# Patient Record
Sex: FEMALE | Race: WHITE | NOT HISPANIC OR LATINO
[De-identification: names, ages, dates, MRNs, and addresses within clinical notes are randomized per-mention and may not be internally consistent; named-entity substitution may affect disease eponyms.]

---

## 2017-03-29 ENCOUNTER — APPOINTMENT (OUTPATIENT)
Dept: FAMILY MEDICINE | Facility: CLINIC | Age: 22
End: 2017-03-29

## 2017-04-21 ENCOUNTER — APPOINTMENT (OUTPATIENT)
Dept: FAMILY MEDICINE | Facility: CLINIC | Age: 22
End: 2017-04-21

## 2017-04-21 VITALS
BODY MASS INDEX: 42.48 KG/M2 | DIASTOLIC BLOOD PRESSURE: 60 MMHG | SYSTOLIC BLOOD PRESSURE: 110 MMHG | TEMPERATURE: 98.5 F | WEIGHT: 225 LBS | HEIGHT: 61 IN

## 2017-04-21 DIAGNOSIS — R23.8 OTHER SKIN CHANGES: ICD-10-CM

## 2017-04-21 RX ORDER — OLANZAPINE 10 MG/1
10 TABLET, FILM COATED ORAL
Qty: 30 | Refills: 0 | Status: COMPLETED | COMMUNITY
Start: 2016-12-24

## 2017-04-25 LAB
ALBUMIN SERPL ELPH-MCNC: 4 G/DL
ALP BLD-CCNC: 69 U/L
ALT SERPL-CCNC: 9 U/L
APTT BLD: 27.4 SEC
AST SERPL-CCNC: 13 U/L
BASOPHILS # BLD AUTO: 0.02 K/UL
BASOPHILS NFR BLD AUTO: 0.2 %
BILIRUB DIRECT SERPL-MCNC: 0.1 MG/DL
BILIRUB INDIRECT SERPL-MCNC: 0.1 MG/DL
BILIRUB SERPL-MCNC: 0.2 MG/DL
EOSINOPHIL # BLD AUTO: 0.01 K/UL
EOSINOPHIL NFR BLD AUTO: 0.1 %
HCT VFR BLD CALC: 40.1 %
HGB BLD-MCNC: 12.4 G/DL
IMM GRANULOCYTES NFR BLD AUTO: 0.1 %
INR PPP: 0.97 RATIO
LYMPHOCYTES # BLD AUTO: 2.04 K/UL
LYMPHOCYTES NFR BLD AUTO: 19.6 %
MAN DIFF?: NORMAL
MCHC RBC-ENTMCNC: 24.5 PG
MCHC RBC-ENTMCNC: 30.9 GM/DL
MCV RBC AUTO: 79.2 FL
MONOCYTES # BLD AUTO: 0.5 K/UL
MONOCYTES NFR BLD AUTO: 4.8 %
NEUTROPHILS # BLD AUTO: 7.82 K/UL
NEUTROPHILS NFR BLD AUTO: 75.2 %
PLATELET # BLD AUTO: 382 K/UL
PROT SERPL-MCNC: 7.8 G/DL
PT BLD: 10.9 SEC
RBC # BLD: 5.06 M/UL
RBC # FLD: 17.3 %
WBC # FLD AUTO: 10.4 K/UL

## 2017-06-30 ENCOUNTER — APPOINTMENT (OUTPATIENT)
Dept: FAMILY MEDICINE | Facility: CLINIC | Age: 22
End: 2017-06-30

## 2017-06-30 VITALS
WEIGHT: 225 LBS | HEART RATE: 64 BPM | BODY MASS INDEX: 42.48 KG/M2 | DIASTOLIC BLOOD PRESSURE: 64 MMHG | HEIGHT: 61 IN | SYSTOLIC BLOOD PRESSURE: 100 MMHG

## 2017-06-30 RX ORDER — BREXPIPRAZOLE 1 MG/1
1 TABLET ORAL
Qty: 30 | Refills: 0 | Status: COMPLETED | COMMUNITY
Start: 2017-04-05 | End: 2017-06-30

## 2017-07-22 ENCOUNTER — APPOINTMENT (OUTPATIENT)
Dept: FAMILY MEDICINE | Facility: CLINIC | Age: 22
End: 2017-07-22

## 2017-07-22 VITALS
DIASTOLIC BLOOD PRESSURE: 74 MMHG | SYSTOLIC BLOOD PRESSURE: 136 MMHG | TEMPERATURE: 98.3 F | BODY MASS INDEX: 42.48 KG/M2 | HEIGHT: 61 IN | WEIGHT: 225 LBS | HEART RATE: 85 BPM | OXYGEN SATURATION: 99 %

## 2017-07-22 DIAGNOSIS — L60.0 INGROWING NAIL: ICD-10-CM

## 2017-07-22 RX ORDER — CEPHALEXIN 250 MG/1
250 CAPSULE ORAL 3 TIMES DAILY
Qty: 21 | Refills: 0 | Status: DISCONTINUED | COMMUNITY
Start: 2017-06-30 | End: 2017-07-22

## 2017-07-24 ENCOUNTER — APPOINTMENT (OUTPATIENT)
Dept: FAMILY MEDICINE | Facility: CLINIC | Age: 22
End: 2017-07-24

## 2017-07-24 VITALS
HEIGHT: 60 IN | BODY MASS INDEX: 43.19 KG/M2 | OXYGEN SATURATION: 98 % | SYSTOLIC BLOOD PRESSURE: 106 MMHG | WEIGHT: 220 LBS | HEART RATE: 97 BPM | DIASTOLIC BLOOD PRESSURE: 72 MMHG | TEMPERATURE: 98.1 F

## 2017-07-24 DIAGNOSIS — L03.031 CELLULITIS OF RIGHT TOE: ICD-10-CM

## 2017-09-10 ENCOUNTER — FORM ENCOUNTER (OUTPATIENT)
Age: 22
End: 2017-09-10

## 2017-09-11 ENCOUNTER — APPOINTMENT (OUTPATIENT)
Dept: RADIOLOGY | Facility: CLINIC | Age: 22
End: 2017-09-11

## 2017-09-11 ENCOUNTER — APPOINTMENT (OUTPATIENT)
Dept: FAMILY MEDICINE | Facility: CLINIC | Age: 22
End: 2017-09-11
Payer: MEDICAID

## 2017-09-11 ENCOUNTER — OUTPATIENT (OUTPATIENT)
Dept: OUTPATIENT SERVICES | Facility: HOSPITAL | Age: 22
LOS: 1 days | End: 2017-09-11
Payer: MEDICAID

## 2017-09-11 VITALS
HEART RATE: 96 BPM | WEIGHT: 220 LBS | HEIGHT: 60 IN | OXYGEN SATURATION: 98 % | BODY MASS INDEX: 43.19 KG/M2 | SYSTOLIC BLOOD PRESSURE: 110 MMHG | DIASTOLIC BLOOD PRESSURE: 78 MMHG

## 2017-09-11 DIAGNOSIS — M25.571 PAIN IN RIGHT ANKLE AND JOINTS OF RIGHT FOOT: ICD-10-CM

## 2017-09-11 DIAGNOSIS — M79.671 PAIN IN RIGHT FOOT: ICD-10-CM

## 2017-09-11 DIAGNOSIS — Z00.8 ENCOUNTER FOR OTHER GENERAL EXAMINATION: ICD-10-CM

## 2017-09-11 PROCEDURE — 73610 X-RAY EXAM OF ANKLE: CPT | Mod: 26,RT

## 2017-09-11 PROCEDURE — 73610 X-RAY EXAM OF ANKLE: CPT

## 2017-09-11 PROCEDURE — 73630 X-RAY EXAM OF FOOT: CPT | Mod: 26,RT

## 2017-09-11 PROCEDURE — 99213 OFFICE O/P EST LOW 20 MIN: CPT

## 2017-09-11 PROCEDURE — 73630 X-RAY EXAM OF FOOT: CPT

## 2017-09-29 ENCOUNTER — APPOINTMENT (OUTPATIENT)
Dept: FAMILY MEDICINE | Facility: CLINIC | Age: 22
End: 2017-09-29
Payer: MEDICAID

## 2017-09-29 VITALS
DIASTOLIC BLOOD PRESSURE: 80 MMHG | HEIGHT: 60 IN | BODY MASS INDEX: 44.17 KG/M2 | TEMPERATURE: 98.2 F | WEIGHT: 225 LBS | SYSTOLIC BLOOD PRESSURE: 110 MMHG

## 2017-09-29 PROCEDURE — 99213 OFFICE O/P EST LOW 20 MIN: CPT | Mod: 25

## 2017-09-29 PROCEDURE — 69209 REMOVE IMPACTED EAR WAX UNI: CPT | Mod: 50

## 2017-09-29 RX ORDER — CEFADROXIL 500 MG/1
500 CAPSULE ORAL TWICE DAILY
Qty: 20 | Refills: 0 | Status: DISCONTINUED | COMMUNITY
Start: 2017-07-22 | End: 2017-09-29

## 2017-09-29 RX ORDER — BREXPIPRAZOLE 3 MG/1
3 TABLET ORAL
Qty: 30 | Refills: 0 | Status: DISCONTINUED | COMMUNITY
Start: 2017-05-31 | End: 2017-09-29

## 2017-09-29 RX ORDER — BREXPIPRAZOLE 4 MG/1
4 TABLET ORAL
Qty: 30 | Refills: 0 | Status: DISCONTINUED | COMMUNITY
Start: 2017-08-03 | End: 2017-09-29

## 2017-10-20 ENCOUNTER — APPOINTMENT (OUTPATIENT)
Dept: FAMILY MEDICINE | Facility: CLINIC | Age: 22
End: 2017-10-20
Payer: MEDICAID

## 2017-10-20 VITALS
HEART RATE: 106 BPM | OXYGEN SATURATION: 98 % | DIASTOLIC BLOOD PRESSURE: 80 MMHG | SYSTOLIC BLOOD PRESSURE: 110 MMHG | TEMPERATURE: 98.3 F | BODY MASS INDEX: 44.17 KG/M2 | HEIGHT: 60 IN | WEIGHT: 225 LBS

## 2017-10-20 PROCEDURE — 99213 OFFICE O/P EST LOW 20 MIN: CPT | Mod: 25

## 2017-10-20 PROCEDURE — 69209 REMOVE IMPACTED EAR WAX UNI: CPT | Mod: LT

## 2017-11-07 ENCOUNTER — APPOINTMENT (OUTPATIENT)
Dept: FAMILY MEDICINE | Facility: CLINIC | Age: 22
End: 2017-11-07
Payer: MEDICAID

## 2017-11-07 VITALS
BODY MASS INDEX: 44.17 KG/M2 | DIASTOLIC BLOOD PRESSURE: 78 MMHG | TEMPERATURE: 98.6 F | HEIGHT: 60 IN | HEART RATE: 87 BPM | OXYGEN SATURATION: 98 % | WEIGHT: 225 LBS | SYSTOLIC BLOOD PRESSURE: 120 MMHG

## 2017-11-07 DIAGNOSIS — Z86.69 PERSONAL HISTORY OF OTHER DISEASES OF THE NERVOUS SYSTEM AND SENSE ORGANS: ICD-10-CM

## 2017-11-07 DIAGNOSIS — H61.22 IMPACTED CERUMEN, LEFT EAR: ICD-10-CM

## 2017-11-07 DIAGNOSIS — Z87.898 PERSONAL HISTORY OF OTHER SPECIFIED CONDITIONS: ICD-10-CM

## 2017-11-07 DIAGNOSIS — H65.92 UNSPECIFIED NONSUPPURATIVE OTITIS MEDIA, LEFT EAR: ICD-10-CM

## 2017-11-07 PROCEDURE — 69209 REMOVE IMPACTED EAR WAX UNI: CPT

## 2017-11-07 PROCEDURE — 99213 OFFICE O/P EST LOW 20 MIN: CPT | Mod: 25

## 2017-11-07 RX ORDER — AMOXICILLIN AND CLAVULANATE POTASSIUM 875; 125 MG/1; MG/1
875-125 TABLET, COATED ORAL
Qty: 20 | Refills: 0 | Status: DISCONTINUED | COMMUNITY
Start: 2017-10-20 | End: 2017-11-07

## 2017-11-21 ENCOUNTER — FORM ENCOUNTER (OUTPATIENT)
Age: 22
End: 2017-11-21

## 2017-11-22 ENCOUNTER — OUTPATIENT (OUTPATIENT)
Dept: OUTPATIENT SERVICES | Facility: HOSPITAL | Age: 22
LOS: 1 days | End: 2017-11-22
Payer: MEDICAID

## 2017-11-22 ENCOUNTER — APPOINTMENT (OUTPATIENT)
Dept: MRI IMAGING | Facility: CLINIC | Age: 22
End: 2017-11-22
Payer: MEDICAID

## 2017-11-22 DIAGNOSIS — Z00.8 ENCOUNTER FOR OTHER GENERAL EXAMINATION: ICD-10-CM

## 2017-11-22 PROCEDURE — 73721 MRI JNT OF LWR EXTRE W/O DYE: CPT

## 2017-11-22 PROCEDURE — 73721 MRI JNT OF LWR EXTRE W/O DYE: CPT | Mod: 26,RT

## 2017-11-22 PROCEDURE — 73718 MRI LOWER EXTREMITY W/O DYE: CPT

## 2017-11-22 PROCEDURE — 73718 MRI LOWER EXTREMITY W/O DYE: CPT | Mod: 26,RT

## 2017-12-29 ENCOUNTER — APPOINTMENT (OUTPATIENT)
Dept: ORTHOPEDIC SURGERY | Facility: CLINIC | Age: 22
End: 2017-12-29
Payer: MEDICAID

## 2017-12-29 VITALS
DIASTOLIC BLOOD PRESSURE: 81 MMHG | HEIGHT: 60 IN | HEART RATE: 101 BPM | BODY MASS INDEX: 44.17 KG/M2 | WEIGHT: 225 LBS | SYSTOLIC BLOOD PRESSURE: 121 MMHG

## 2017-12-29 DIAGNOSIS — M67.00 SHORT ACHILLES TENDON (ACQUIRED), UNSPECIFIED ANKLE: ICD-10-CM

## 2017-12-29 PROCEDURE — 99204 OFFICE O/P NEW MOD 45 MIN: CPT

## 2017-12-29 PROCEDURE — 73610 X-RAY EXAM OF ANKLE: CPT | Mod: RT

## 2018-01-09 ENCOUNTER — RESULT CHARGE (OUTPATIENT)
Age: 23
End: 2018-01-09

## 2018-01-10 ENCOUNTER — APPOINTMENT (OUTPATIENT)
Dept: FAMILY MEDICINE | Facility: CLINIC | Age: 23
End: 2018-01-10
Payer: MEDICAID

## 2018-01-10 VITALS
HEART RATE: 74 BPM | WEIGHT: 236 LBS | DIASTOLIC BLOOD PRESSURE: 70 MMHG | SYSTOLIC BLOOD PRESSURE: 120 MMHG | TEMPERATURE: 98.3 F | HEIGHT: 60 IN | BODY MASS INDEX: 46.33 KG/M2 | OXYGEN SATURATION: 99 %

## 2018-01-10 DIAGNOSIS — H61.23 IMPACTED CERUMEN, BILATERAL: ICD-10-CM

## 2018-01-10 PROCEDURE — 99213 OFFICE O/P EST LOW 20 MIN: CPT | Mod: 25

## 2018-01-10 PROCEDURE — 69209 REMOVE IMPACTED EAR WAX UNI: CPT | Mod: LT

## 2018-01-10 RX ORDER — CIPROFLOXACIN AND DEXAMETHASONE 3; 1 MG/ML; MG/ML
0.3-0.1 SUSPENSION/ DROPS AURICULAR (OTIC) TWICE DAILY
Qty: 1 | Refills: 0 | Status: DISCONTINUED | COMMUNITY
Start: 2017-11-07 | End: 2018-01-10

## 2018-04-24 ENCOUNTER — APPOINTMENT (OUTPATIENT)
Dept: FAMILY MEDICINE | Facility: CLINIC | Age: 23
End: 2018-04-24
Payer: MEDICAID

## 2018-04-24 VITALS
BODY MASS INDEX: 46.33 KG/M2 | TEMPERATURE: 97.6 F | OXYGEN SATURATION: 98 % | HEART RATE: 72 BPM | SYSTOLIC BLOOD PRESSURE: 132 MMHG | HEIGHT: 60 IN | RESPIRATION RATE: 16 BRPM | DIASTOLIC BLOOD PRESSURE: 70 MMHG | WEIGHT: 236 LBS

## 2018-04-24 PROCEDURE — 99213 OFFICE O/P EST LOW 20 MIN: CPT | Mod: 25

## 2018-04-24 PROCEDURE — 69209 REMOVE IMPACTED EAR WAX UNI: CPT

## 2018-05-08 ENCOUNTER — APPOINTMENT (OUTPATIENT)
Dept: FAMILY MEDICINE | Facility: CLINIC | Age: 23
End: 2018-05-08
Payer: MEDICAID

## 2018-05-08 VITALS
HEIGHT: 60 IN | HEART RATE: 103 BPM | DIASTOLIC BLOOD PRESSURE: 80 MMHG | WEIGHT: 233.5 LBS | SYSTOLIC BLOOD PRESSURE: 122 MMHG | OXYGEN SATURATION: 99 % | BODY MASS INDEX: 45.84 KG/M2

## 2018-05-08 PROCEDURE — 99213 OFFICE O/P EST LOW 20 MIN: CPT

## 2018-05-08 RX ORDER — MIRTAZAPINE 15 MG/1
15 TABLET, FILM COATED ORAL
Qty: 30 | Refills: 0 | Status: DISCONTINUED | COMMUNITY
Start: 2017-09-20 | End: 2018-05-08

## 2018-05-08 RX ORDER — LORAZEPAM 1 MG/1
1 TABLET ORAL
Qty: 30 | Refills: 0 | Status: DISCONTINUED | COMMUNITY
Start: 2017-09-20 | End: 2018-05-08

## 2018-05-19 ENCOUNTER — APPOINTMENT (OUTPATIENT)
Dept: FAMILY MEDICINE | Facility: CLINIC | Age: 23
End: 2018-05-19
Payer: MEDICAID

## 2018-05-19 VITALS
TEMPERATURE: 98.5 F | SYSTOLIC BLOOD PRESSURE: 112 MMHG | BODY MASS INDEX: 45.84 KG/M2 | DIASTOLIC BLOOD PRESSURE: 64 MMHG | HEIGHT: 60 IN | WEIGHT: 233.5 LBS

## 2018-05-19 PROCEDURE — 99213 OFFICE O/P EST LOW 20 MIN: CPT

## 2018-05-19 NOTE — ASSESSMENT
[FreeTextEntry1] : Abdominal pain, and vomiting with little p.o. intake in the past 24 hours. Referred back to emergency room

## 2018-05-19 NOTE — HISTORY OF PRESENT ILLNESS
[FreeTextEntry8] : Vomiting and right upper and mid quadrant abdominal pain began yesterday morning at 11 AM driving home from [place]. The vomiting continued into the late afternoon then stopped and then began again later that evening. Patient was able to sleep however, at 5 AM began complaining again of abdominal pain with vomiting along with right thigh and knee pain. Patient had loose bowel movement earlier this morning. Has been unable to eat or drink.\par \par Similar symptoms 2 weeks ago evaluated in emergency room CAT scan ultrasound. Blood tests all normal. Patient discharged home\par \par Chronic psychiatric illness. Causes patientTo be slow to respond and tearful. He has tenderness of her right upper quadranT THIGH AND KNEE PAIN

## 2018-05-22 ENCOUNTER — APPOINTMENT (OUTPATIENT)
Dept: FAMILY MEDICINE | Facility: CLINIC | Age: 23
End: 2018-05-22

## 2018-05-31 ENCOUNTER — APPOINTMENT (OUTPATIENT)
Dept: FAMILY MEDICINE | Facility: CLINIC | Age: 23
End: 2018-05-31
Payer: MEDICAID

## 2018-05-31 VITALS
WEIGHT: 229.5 LBS | SYSTOLIC BLOOD PRESSURE: 116 MMHG | DIASTOLIC BLOOD PRESSURE: 70 MMHG | BODY MASS INDEX: 45.06 KG/M2 | HEIGHT: 60 IN

## 2018-05-31 PROCEDURE — 99213 OFFICE O/P EST LOW 20 MIN: CPT

## 2018-06-02 NOTE — REVIEW OF SYSTEMS
[Abdominal Pain] : abdominal pain [Nausea] : nausea [Vomiting] : vomiting [Joint Pain] : joint pain [Negative] : Respiratory [FreeTextEntry7] : see hpi

## 2018-06-02 NOTE — HISTORY OF PRESENT ILLNESS
[de-identified] : Pt for f/u unremitting nausea/vomiting started during was readmitted to [facility] 5/19/18 discharged 5/22/18. Pt had HIDA scan and upper endoscopy were performed during hospitalization which were equivocal. Pt then f/u with GI on 5/25/18 and was rx famotidine 20mg. Pt now back to baseline. Pt c/o continued R knee pain. Denies trauma. Advil helped pain.

## 2018-06-02 NOTE — ASSESSMENT
[FreeTextEntry1] : abdominal pain - resolved. monitor.\par R knee pain - ibuprofen prn. check XR of knee

## 2018-07-09 LAB
GLUCOSE 1H P LAC PO SERPL-MCNC: 113 MG/DL
GLUCOSE 30M P LAC PO SERPL-MCNC: 117 MG/DL
LACTOSE 1.5H P LAC PO SERPL-SCNC: 95 MG/DL
LACTOSE 2H P 50 G LAC PO SERPL-MCNC: 99 MG/DL
LACTOSE 3H P LAC PO SERPL-MCNC: 93 MG/DL
LACTOSE PRE FAST SERPL-MCNC: 93 MG/DL

## 2018-07-11 ENCOUNTER — APPOINTMENT (OUTPATIENT)
Dept: FAMILY MEDICINE | Facility: CLINIC | Age: 23
End: 2018-07-11
Payer: MEDICAID

## 2018-07-11 VITALS
DIASTOLIC BLOOD PRESSURE: 70 MMHG | SYSTOLIC BLOOD PRESSURE: 110 MMHG | OXYGEN SATURATION: 98 % | WEIGHT: 238 LBS | HEIGHT: 62 IN | HEART RATE: 98 BPM | BODY MASS INDEX: 43.79 KG/M2 | TEMPERATURE: 98.4 F

## 2018-07-11 DIAGNOSIS — H61.23 IMPACTED CERUMEN, BILATERAL: ICD-10-CM

## 2018-07-11 PROCEDURE — 69209 REMOVE IMPACTED EAR WAX UNI: CPT | Mod: 50

## 2018-07-11 PROCEDURE — 99213 OFFICE O/P EST LOW 20 MIN: CPT | Mod: 25

## 2018-07-11 NOTE — PHYSICAL EXAM
[No Acute Distress] : no acute distress [Well Nourished] : well nourished [Well Developed] : well developed [Normal Oropharynx] : the oropharynx was normal [Normal Appearance] : was normal in appearance [Neck Supple] : was supple [No Respiratory Distress] : no respiratory distress  [Clear to Auscultation] : lungs were clear to auscultation bilaterally [Normal Rate] : normal rate  [Regular Rhythm] : with a regular rhythm [Normal S1, S2] : normal S1 and S2 [No Murmur] : no murmur heard [Alert and Oriented x3] : oriented to person, place, and time [de-identified] : bilateral cerumen, right>left; hard, moveable mass palpated left side of face, in temporal mandibular region

## 2018-07-11 NOTE — ASSESSMENT
[FreeTextEntry1] : using curettage, able to remove cerumen from left ear, left TM normal\par right ear irrigated, patient tolerated well, right TM normal \par \par mass palpated to left side of face, patient and her mother unaware of mass\par unclear how long it has been present\par advised to check ultrasound, see ENT

## 2018-07-11 NOTE — HISTORY OF PRESENT ILLNESS
[FreeTextEntry8] : \par 23 year old female presents c/o left ear feeling clogged along with a "stinging" sensation since yesterday. No fever. No congestion. Has had ears irrigated previously with no problem.

## 2018-07-12 ENCOUNTER — FORM ENCOUNTER (OUTPATIENT)
Age: 23
End: 2018-07-12

## 2018-07-13 ENCOUNTER — APPOINTMENT (OUTPATIENT)
Dept: ULTRASOUND IMAGING | Facility: CLINIC | Age: 23
End: 2018-07-13
Payer: MEDICAID

## 2018-07-13 ENCOUNTER — OUTPATIENT (OUTPATIENT)
Dept: OUTPATIENT SERVICES | Facility: HOSPITAL | Age: 23
LOS: 1 days | End: 2018-07-13
Payer: MEDICAID

## 2018-07-13 DIAGNOSIS — Z00.8 ENCOUNTER FOR OTHER GENERAL EXAMINATION: ICD-10-CM

## 2018-07-13 PROCEDURE — 76536 US EXAM OF HEAD AND NECK: CPT

## 2018-07-13 PROCEDURE — 76536 US EXAM OF HEAD AND NECK: CPT | Mod: 26

## 2018-07-17 ENCOUNTER — FORM ENCOUNTER (OUTPATIENT)
Age: 23
End: 2018-07-17

## 2018-07-18 ENCOUNTER — APPOINTMENT (OUTPATIENT)
Dept: ULTRASOUND IMAGING | Facility: CLINIC | Age: 23
End: 2018-07-18
Payer: MEDICAID

## 2018-07-18 ENCOUNTER — RESULT REVIEW (OUTPATIENT)
Age: 23
End: 2018-07-18

## 2018-07-18 ENCOUNTER — OUTPATIENT (OUTPATIENT)
Dept: OUTPATIENT SERVICES | Facility: HOSPITAL | Age: 23
LOS: 1 days | End: 2018-07-18
Payer: MEDICAID

## 2018-07-18 DIAGNOSIS — Z00.8 ENCOUNTER FOR OTHER GENERAL EXAMINATION: ICD-10-CM

## 2018-07-18 PROCEDURE — 76942 ECHO GUIDE FOR BIOPSY: CPT | Mod: 26

## 2018-07-18 PROCEDURE — 10022: CPT

## 2018-07-18 PROCEDURE — 76942 ECHO GUIDE FOR BIOPSY: CPT

## 2018-07-24 ENCOUNTER — APPOINTMENT (OUTPATIENT)
Dept: FAMILY MEDICINE | Facility: CLINIC | Age: 23
End: 2018-07-24
Payer: MEDICAID

## 2018-07-24 VITALS
DIASTOLIC BLOOD PRESSURE: 82 MMHG | HEART RATE: 113 BPM | BODY MASS INDEX: 43.79 KG/M2 | HEIGHT: 62 IN | SYSTOLIC BLOOD PRESSURE: 126 MMHG | OXYGEN SATURATION: 98 % | WEIGHT: 238 LBS

## 2018-07-24 PROCEDURE — 99213 OFFICE O/P EST LOW 20 MIN: CPT

## 2018-07-25 NOTE — HISTORY OF PRESENT ILLNESS
[FreeTextEntry8] : \par 23 year old female presents c/o right sided abdominal pain, started last night, became severe at night. Has associated nausea, no episodes of vomiting. No diarrhea, had normal bowel movement earlier this morning. No fever. Pain is similar to the pain that she developed in May 2018, had work up done at [facility] ER, including CT abdomen/pelvis, abdominal and pelvic ultrasounds. Patient reports being seen by GI, GYN. Will be due for her period soon. Took Advil w/o much improvement.\par \par Ultrasound Abdomen (5/4/18) no gallstones seen, no acute abnormalities\par \par Ultrasound Pelvis (5/4/18): no abnormalities\par \par Xray abdomen (5/4/18): no obstruction\par \par CT abdomen/pelvis (5/4/18): no appendicitis, right ovary mildly larger than the left, with more prominent cystic/follicular changes

## 2018-07-25 NOTE — REVIEW OF SYSTEMS
[Abdominal Pain] : abdominal pain [Fever] : no fever [Chills] : no chills [Chest Pain] : no chest pain [Shortness Of Breath] : no shortness of breath [Cough] : no cough [FreeTextEntry7] : as per HPI

## 2018-07-25 NOTE — PHYSICAL EXAM
[No Acute Distress] : no acute distress [Well Nourished] : well nourished [Well Developed] : well developed [Normal Appearance] : was normal in appearance [Neck Supple] : was supple [No Respiratory Distress] : no respiratory distress  [Clear to Auscultation] : lungs were clear to auscultation bilaterally [Normal Rhythm/Effort] : normal respiratory rhythm and effort [Normal Rate] : normal rate  [Regular Rhythm] : with a regular rhythm [Normal S1, S2] : normal S1 and S2 [No Murmur] : no murmur heard [Soft] : abdomen soft [Normal Bowel Sounds] : normal bowel sounds [Obese] : obese [Normal Anterior Cervical Nodes] : no anterior cervical lymphadenopathy [No Rash] : no rash [Alert and Oriented x3] : oriented to person, place, and time [de-identified] : Obese [de-identified] : tenderness right side of abdomen

## 2018-08-06 ENCOUNTER — APPOINTMENT (OUTPATIENT)
Dept: OTOLARYNGOLOGY | Facility: CLINIC | Age: 23
End: 2018-08-06
Payer: MEDICAID

## 2018-08-06 VITALS
SYSTOLIC BLOOD PRESSURE: 118 MMHG | BODY MASS INDEX: 43.79 KG/M2 | DIASTOLIC BLOOD PRESSURE: 62 MMHG | HEIGHT: 62 IN | RESPIRATION RATE: 18 BRPM | OXYGEN SATURATION: 98 % | HEART RATE: 104 BPM | WEIGHT: 238 LBS

## 2018-08-06 DIAGNOSIS — Z78.9 OTHER SPECIFIED HEALTH STATUS: ICD-10-CM

## 2018-08-06 DIAGNOSIS — R22.0 LOCALIZED SWELLING, MASS AND LUMP, HEAD: ICD-10-CM

## 2018-08-06 PROCEDURE — 99205 OFFICE O/P NEW HI 60 MIN: CPT

## 2018-08-06 RX ORDER — ONDANSETRON 4 MG/1
4 TABLET, ORALLY DISINTEGRATING ORAL EVERY 8 HOURS
Qty: 1 | Refills: 0 | Status: COMPLETED | COMMUNITY
Start: 2018-07-24 | End: 2018-08-06

## 2018-08-06 RX ORDER — SERTRALINE HYDROCHLORIDE 100 MG/1
100 TABLET, FILM COATED ORAL
Qty: 30 | Refills: 0 | Status: DISCONTINUED | COMMUNITY
Start: 2017-04-05 | End: 2018-08-06

## 2018-08-06 RX ORDER — DICLOFENAC SODIUM 50 MG/1
50 TABLET, DELAYED RELEASE ORAL
Qty: 30 | Refills: 0 | Status: COMPLETED | COMMUNITY
Start: 2017-12-08 | End: 2018-08-06

## 2018-08-09 ENCOUNTER — APPOINTMENT (OUTPATIENT)
Dept: FAMILY MEDICINE | Facility: CLINIC | Age: 23
End: 2018-08-09

## 2018-08-29 ENCOUNTER — OUTPATIENT (OUTPATIENT)
Dept: OUTPATIENT SERVICES | Facility: HOSPITAL | Age: 23
LOS: 1 days | End: 2018-08-29

## 2018-08-29 ENCOUNTER — MESSAGE (OUTPATIENT)
Age: 23
End: 2018-08-29

## 2018-08-29 VITALS
RESPIRATION RATE: 18 BRPM | WEIGHT: 229.06 LBS | DIASTOLIC BLOOD PRESSURE: 74 MMHG | TEMPERATURE: 99 F | SYSTOLIC BLOOD PRESSURE: 128 MMHG | HEART RATE: 74 BPM | HEIGHT: 60 IN | OXYGEN SATURATION: 99 %

## 2018-08-29 DIAGNOSIS — K11.9 DISEASE OF SALIVARY GLAND, UNSPECIFIED: ICD-10-CM

## 2018-08-29 DIAGNOSIS — G93.2 BENIGN INTRACRANIAL HYPERTENSION: Chronic | ICD-10-CM

## 2018-08-29 DIAGNOSIS — Z98.890 OTHER SPECIFIED POSTPROCEDURAL STATES: Chronic | ICD-10-CM

## 2018-08-29 DIAGNOSIS — F31.9 BIPOLAR DISORDER, UNSPECIFIED: ICD-10-CM

## 2018-08-29 DIAGNOSIS — K11.1 HYPERTROPHY OF SALIVARY GLAND: ICD-10-CM

## 2018-08-29 LAB
BUN SERPL-MCNC: 9 MG/DL — SIGNIFICANT CHANGE UP (ref 7–23)
CALCIUM SERPL-MCNC: 9.2 MG/DL — SIGNIFICANT CHANGE UP (ref 8.4–10.5)
CHLORIDE SERPL-SCNC: 102 MMOL/L — SIGNIFICANT CHANGE UP (ref 98–107)
CO2 SERPL-SCNC: 22 MMOL/L — SIGNIFICANT CHANGE UP (ref 22–31)
CREAT SERPL-MCNC: 0.85 MG/DL — SIGNIFICANT CHANGE UP (ref 0.5–1.3)
GLUCOSE SERPL-MCNC: 81 MG/DL — SIGNIFICANT CHANGE UP (ref 70–99)
HCG SERPL-ACNC: < 5 MIU/ML — SIGNIFICANT CHANGE UP
HCT VFR BLD CALC: 41.4 % — SIGNIFICANT CHANGE UP (ref 34.5–45)
HGB BLD-MCNC: 12.9 G/DL — SIGNIFICANT CHANGE UP (ref 11.5–15.5)
MCHC RBC-ENTMCNC: 25.9 PG — LOW (ref 27–34)
MCHC RBC-ENTMCNC: 31.2 % — LOW (ref 32–36)
MCV RBC AUTO: 83.1 FL — SIGNIFICANT CHANGE UP (ref 80–100)
NRBC # FLD: 0 — SIGNIFICANT CHANGE UP
PLATELET # BLD AUTO: 351 K/UL — SIGNIFICANT CHANGE UP (ref 150–400)
PMV BLD: 10.6 FL — SIGNIFICANT CHANGE UP (ref 7–13)
POTASSIUM SERPL-MCNC: 3.9 MMOL/L — SIGNIFICANT CHANGE UP (ref 3.5–5.3)
POTASSIUM SERPL-SCNC: 3.9 MMOL/L — SIGNIFICANT CHANGE UP (ref 3.5–5.3)
RBC # BLD: 4.98 M/UL — SIGNIFICANT CHANGE UP (ref 3.8–5.2)
RBC # FLD: 15.4 % — HIGH (ref 10.3–14.5)
SODIUM SERPL-SCNC: 137 MMOL/L — SIGNIFICANT CHANGE UP (ref 135–145)
WBC # BLD: 9.29 K/UL — SIGNIFICANT CHANGE UP (ref 3.8–10.5)
WBC # FLD AUTO: 9.29 K/UL — SIGNIFICANT CHANGE UP (ref 3.8–10.5)

## 2018-08-29 RX ORDER — SODIUM CHLORIDE 9 MG/ML
1000 INJECTION, SOLUTION INTRAVENOUS
Qty: 0 | Refills: 0 | Status: DISCONTINUED | OUTPATIENT
Start: 2018-09-12 | End: 2018-09-27

## 2018-08-29 NOTE — H&P PST ADULT - NEGATIVE ENMT SYMPTOMS
no vertigo/no sinus symptoms/no nasal congestion/no ear pain/no hearing difficulty/no tinnitus/no throat pain/no dysphagia

## 2018-08-29 NOTE — H&P PST ADULT - HISTORY OF PRESENT ILLNESS
Pt is a 23 yr old female scheduled for Left parotidectomy with Dr Gonzalez 9/12/18 for enlargement of left parotid gland that was noted 7/18 by PCP. Pt denies pain or dysphagia but states she thinks it has increased slightly in size over past month. Pt is medicated for bipolar/depression and was seen with mother.

## 2018-08-29 NOTE — H&P PST ADULT - PSH
RIP (raised intracranial pressure)  monitored but was negative - 2013 H/O endoscopy    RIP (raised intracranial pressure)  monitored but was negative - 2013

## 2018-08-29 NOTE — H&P PST ADULT - PROBLEM SELECTOR PLAN 1
Pt and mother given preop instructions and to take own GI protection - verbalized understanding  [facility] ordered am DOS

## 2018-08-29 NOTE — H&P PST ADULT - NEGATIVE NEUROLOGICAL SYMPTOMS
no syncope/no focal seizures/no transient paralysis/no weakness/no generalized seizures/no paresthesias/no headache

## 2018-08-29 NOTE — H&P PST ADULT - PMH
Developmental delay    Lyme disease    Lyme disease  at age 5, resolved  Mentally challenged    Pseudotumor cerebri    Vesicoureteral reflux  in early infancy, resolved. Bipolar disease, chronic    Depression    Developmental delay    Lyme disease    Lyme disease  at age 5, resolved  Mentally challenged    Parotid hypertrophy  Left  Pseudotumor cerebri    Vesicoureteral reflux  in early infancy, resolved.

## 2018-09-04 ENCOUNTER — APPOINTMENT (OUTPATIENT)
Dept: FAMILY MEDICINE | Facility: CLINIC | Age: 23
End: 2018-09-04
Payer: MEDICAID

## 2018-09-04 VITALS
DIASTOLIC BLOOD PRESSURE: 72 MMHG | WEIGHT: 229.13 LBS | HEART RATE: 88 BPM | BODY MASS INDEX: 44.98 KG/M2 | HEIGHT: 60 IN | SYSTOLIC BLOOD PRESSURE: 110 MMHG | OXYGEN SATURATION: 98 %

## 2018-09-04 PROCEDURE — 99213 OFFICE O/P EST LOW 20 MIN: CPT

## 2018-09-04 NOTE — HISTORY OF PRESENT ILLNESS
[No Pertinent Cardiac History] : no history of aortic stenosis, atrial fibrillation, coronary artery disease, recent myocardial infarction, or implantable device/pacemaker [No Pertinent Pulmonary History] : no history of asthma, COPD, sleep apnea, or smoking [No Adverse Anesthesia Reaction] : no adverse anesthesia reaction in self or family member [(Patient denies any chest pain, claudication, dyspnea on exertion, orthopnea, palpitations or syncope)] : Patient denies any chest pain, claudication, dyspnea on exertion, orthopnea, palpitations or syncope [Good (7-10 METs)] : Good (7-10 METs) [FreeTextEntry1] : L parotid mass resection [FreeTextEntry2] : 9/12/18 [FreeTextEntry3] : Ronald Gonzalez [FreeTextEntry4] : Pt in office for medical clearance. Pt to go for procedure on 9/12/18, to be performed by Dr. Gonzalez. Pt denies chest pain, palpitations, dyspnea, fever, chills, nausea, or vomiting.

## 2018-09-04 NOTE — ASSESSMENT
[Patient Optimized for Surgery] : Patient optimized for surgery [No Further Testing Recommended] : no further testing recommended [As per surgery] : as per surgery [FreeTextEntry4] : Pt is medically optimized for procedure at this time.\par \par Fax to [facility]

## 2018-09-11 ENCOUNTER — TRANSCRIPTION ENCOUNTER (OUTPATIENT)
Age: 23
End: 2018-09-11

## 2018-09-11 PROBLEM — F31.9 BIPOLAR DISORDER, UNSPECIFIED: Chronic | Status: ACTIVE | Noted: 2018-08-29

## 2018-09-11 PROBLEM — F32.9 MAJOR DEPRESSIVE DISORDER, SINGLE EPISODE, UNSPECIFIED: Chronic | Status: ACTIVE | Noted: 2018-08-29

## 2018-09-11 PROBLEM — K11.1 HYPERTROPHY OF SALIVARY GLAND: Chronic | Status: ACTIVE | Noted: 2018-08-29

## 2018-09-12 ENCOUNTER — OUTPATIENT (OUTPATIENT)
Dept: OUTPATIENT SERVICES | Facility: HOSPITAL | Age: 23
LOS: 1 days | Discharge: HOME | End: 2018-09-12
Payer: MEDICAID

## 2018-09-12 ENCOUNTER — RESULT REVIEW (OUTPATIENT)
Age: 23
End: 2018-09-12

## 2018-09-12 ENCOUNTER — APPOINTMENT (OUTPATIENT)
Dept: OTOLARYNGOLOGY | Facility: HOSPITAL | Age: 23
End: 2018-09-12

## 2018-09-12 VITALS
OXYGEN SATURATION: 100 % | HEART RATE: 84 BPM | HEIGHT: 60 IN | DIASTOLIC BLOOD PRESSURE: 78 MMHG | SYSTOLIC BLOOD PRESSURE: 127 MMHG | WEIGHT: 229.06 LBS | RESPIRATION RATE: 16 BRPM | TEMPERATURE: 99 F

## 2018-09-12 VITALS
HEART RATE: 80 BPM | TEMPERATURE: 98 F | DIASTOLIC BLOOD PRESSURE: 66 MMHG | RESPIRATION RATE: 16 BRPM | OXYGEN SATURATION: 97 % | SYSTOLIC BLOOD PRESSURE: 109 MMHG

## 2018-09-12 DIAGNOSIS — G93.2 BENIGN INTRACRANIAL HYPERTENSION: Chronic | ICD-10-CM

## 2018-09-12 DIAGNOSIS — Z98.890 OTHER SPECIFIED POSTPROCEDURAL STATES: Chronic | ICD-10-CM

## 2018-09-12 DIAGNOSIS — K11.9 DISEASE OF SALIVARY GLAND, UNSPECIFIED: ICD-10-CM

## 2018-09-12 LAB — HCG UR QL: NEGATIVE — SIGNIFICANT CHANGE UP

## 2018-09-12 PROCEDURE — 42415 EXCISE PAROTID GLAND/LESION: CPT | Mod: GC

## 2018-09-12 PROCEDURE — 88307 TISSUE EXAM BY PATHOLOGIST: CPT | Mod: 26

## 2018-09-12 RX ORDER — SODIUM CHLORIDE 9 MG/ML
1000 INJECTION, SOLUTION INTRAVENOUS
Qty: 0 | Refills: 0 | Status: DISCONTINUED | OUTPATIENT
Start: 2018-09-12 | End: 2018-09-27

## 2018-09-12 RX ORDER — ONDANSETRON 8 MG/1
4 TABLET, FILM COATED ORAL ONCE
Qty: 0 | Refills: 0 | Status: COMPLETED | OUTPATIENT
Start: 2018-09-12 | End: 2018-09-12

## 2018-09-12 RX ORDER — ALPRAZOLAM 0.25 MG
0 TABLET ORAL
Qty: 0 | Refills: 0 | COMMUNITY

## 2018-09-12 RX ORDER — ZIPRASIDONE HYDROCHLORIDE 20 MG/1
1 CAPSULE ORAL
Qty: 0 | Refills: 0 | COMMUNITY

## 2018-09-12 RX ORDER — FAMOTIDINE 10 MG/ML
1 INJECTION INTRAVENOUS
Qty: 0 | Refills: 0 | COMMUNITY

## 2018-09-12 RX ORDER — OXYCODONE AND ACETAMINOPHEN 5; 325 MG/1; MG/1
1 TABLET ORAL EVERY 4 HOURS
Qty: 0 | Refills: 0 | Status: DISCONTINUED | OUTPATIENT
Start: 2018-09-12 | End: 2018-09-12

## 2018-09-12 RX ORDER — SERTRALINE 25 MG/1
1 TABLET, FILM COATED ORAL
Qty: 0 | Refills: 0 | COMMUNITY

## 2018-09-12 RX ORDER — CEPHALEXIN 500 MG
1 CAPSULE ORAL
Qty: 20 | Refills: 0 | OUTPATIENT
Start: 2018-09-12 | End: 2018-09-21

## 2018-09-12 RX ORDER — FENTANYL CITRATE 50 UG/ML
50 INJECTION INTRAVENOUS ONCE
Qty: 0 | Refills: 0 | Status: DISCONTINUED | OUTPATIENT
Start: 2018-09-12 | End: 2018-09-12

## 2018-09-12 RX ADMIN — FENTANYL CITRATE 50 MICROGRAM(S): 50 INJECTION INTRAVENOUS at 12:20

## 2018-09-12 RX ADMIN — ONDANSETRON 4 MILLIGRAM(S): 8 TABLET, FILM COATED ORAL at 12:45

## 2018-09-12 RX ADMIN — FENTANYL CITRATE 50 MICROGRAM(S): 50 INJECTION INTRAVENOUS at 12:35

## 2018-09-12 NOTE — ASU DISCHARGE PLAN (ADULT/PEDIATRIC). - MEDICATION SUMMARY - MEDICATIONS TO TAKE
I will START or STAY ON the medications listed below when I get home from the hospital:    oxyCODONE-acetaminophen 5 mg-325 mg oral tablet  -- 1 tab(s) by mouth every 4 hours, As needed, Moderate Pain (4 - 6) MDD:6 tabs  -- Indication: For pain    Zoloft 100 mg oral tablet  -- 1 tab(s) by mouth once a day  -- Indication: For home    Geodon 60 mg oral capsule  -- 1 cap(s) by mouth 2 times a day  -- Indication: For home    ALPRAZolam 0.25 mg oral tablet  -- orally 2 times a day  -- Indication: For home    cephalexin 500 mg oral tablet  -- 1 tab(s) by mouth 2 times a day   -- Finish all this medication unless otherwise directed by prescriber.    -- Indication: For home    famotidine 20 mg oral tablet  -- 1 tab(s) by mouth 2 times a day  -- Indication: For home    multivitamin  -- 1 tab(s) by mouth once a day. last dose 8/29/2018  -- Indication: For home

## 2018-09-12 NOTE — ASU DISCHARGE PLAN (ADULT/PEDIATRIC). - NOTIFY
Bleeding that does not stop Persistent Nausea and Vomiting/Fever greater than 101/Swelling that continues/Pain not relieved by Medications/Bleeding that does not stop

## 2018-09-12 NOTE — ASU DISCHARGE PLAN (ADULT/PEDIATRIC). - SPECIAL INSTRUCTIONS
resume regular diet as tolerated    take percocet or tylenol for pain as needed every 4-6 hrs     take antibiotics as directed     you should expect to have some numbness and pain around incision site and ear lobe, fever in first 24 hrs after surgery is also expected    record DAMIEN drain output daily, bring with you to your follow up appt with Dr. Gonzalez, please call for appointment in 1 week [phone] resume regular diet as tolerated    take percocet or tylenol for pain as needed every 4-6 hrs     take antibiotics as directed     you should expect to have some numbness and pain around incision site and ear lobe, fever in first 24 hrs after surgery is also expected    record DAMIEN drain output daily, bring with you to your follow up appt with Dr. Gonzalez, please call for appointment in 1 week [phone]     no tylenol or tylenol containing products will 4:30 PM

## 2018-09-13 ENCOUNTER — APPOINTMENT (OUTPATIENT)
Dept: OTOLARYNGOLOGY | Facility: CLINIC | Age: 23
End: 2018-09-13
Payer: MEDICAID

## 2018-09-13 PROCEDURE — 99024 POSTOP FOLLOW-UP VISIT: CPT

## 2018-09-14 LAB — SURGICAL PATHOLOGY STUDY: SIGNIFICANT CHANGE UP

## 2018-09-20 ENCOUNTER — APPOINTMENT (OUTPATIENT)
Dept: OTOLARYNGOLOGY | Facility: CLINIC | Age: 23
End: 2018-09-20
Payer: MEDICAID

## 2018-09-20 PROCEDURE — 99024 POSTOP FOLLOW-UP VISIT: CPT

## 2018-09-26 ENCOUNTER — APPOINTMENT (OUTPATIENT)
Dept: FAMILY MEDICINE | Facility: CLINIC | Age: 23
End: 2018-09-26
Payer: MEDICAID

## 2018-09-26 ENCOUNTER — MED ADMIN CHARGE (OUTPATIENT)
Age: 23
End: 2018-09-26

## 2018-09-26 VITALS
OXYGEN SATURATION: 99 % | DIASTOLIC BLOOD PRESSURE: 74 MMHG | TEMPERATURE: 98.7 F | BODY MASS INDEX: 45.16 KG/M2 | SYSTOLIC BLOOD PRESSURE: 112 MMHG | HEART RATE: 84 BPM | WEIGHT: 230 LBS | HEIGHT: 60 IN

## 2018-09-26 DIAGNOSIS — Z86.69 PERSONAL HISTORY OF OTHER DISEASES OF THE NERVOUS SYSTEM AND SENSE ORGANS: ICD-10-CM

## 2018-09-26 DIAGNOSIS — K59.00 CONSTIPATION, UNSPECIFIED: ICD-10-CM

## 2018-09-26 DIAGNOSIS — R10.31 RIGHT LOWER QUADRANT PAIN: ICD-10-CM

## 2018-09-26 PROCEDURE — G0008: CPT

## 2018-09-26 PROCEDURE — 90686 IIV4 VACC NO PRSV 0.5 ML IM: CPT

## 2018-09-26 PROCEDURE — 99213 OFFICE O/P EST LOW 20 MIN: CPT | Mod: 25

## 2018-09-26 RX ORDER — POLYETHYLENE GLYCOL 3350 17 G/17G
17 POWDER, FOR SOLUTION ORAL
Qty: 30 | Refills: 0 | Status: DISCONTINUED | COMMUNITY
Start: 2018-05-04 | End: 2018-09-26

## 2018-09-26 NOTE — PHYSICAL EXAM
[Well Developed] : well developed [Well Nourished] : well nourished [Normal] : normal [Soft, Nontender] : the abdomen was soft and nontender [No Mass] : no masses were palpated [No HSM] : no hepatosplenomegaly noted [Normal Mental Status] : the patient's orientation, memory, attention, language and fund of knowledge were normal [Appropriate] : appropriate [Impaired judgment] : intact judgment [Impaired Insight] : intact insight

## 2018-09-26 NOTE — PLAN
[FreeTextEntry1] : Trial of probiotic and Metamucil if needed. \par Follow up with gynecology as planned.

## 2018-09-26 NOTE — HISTORY OF PRESENT ILLNESS
[FreeTextEntry8] : Ongoing episodes of RLQ abdominal pain. She has had multiple CT scans and is due for gyne trans vaginal US. She has episodes of  constipation and diarrhea.  Some burping. No bloating. Testing for lactose intolerance negative. \par She was to have had stool testing done. Three dogs and a cat in the household. \par Mom gives her Advil at times for the pain. Trying to have healthy meals as family needing to lose weight.  [Parent] : parent

## 2018-10-29 ENCOUNTER — APPOINTMENT (OUTPATIENT)
Dept: FAMILY MEDICINE | Facility: CLINIC | Age: 23
End: 2018-10-29
Payer: MEDICAID

## 2018-10-29 VITALS
DIASTOLIC BLOOD PRESSURE: 76 MMHG | WEIGHT: 220 LBS | HEART RATE: 121 BPM | OXYGEN SATURATION: 99 % | BODY MASS INDEX: 43.19 KG/M2 | HEIGHT: 60 IN | SYSTOLIC BLOOD PRESSURE: 120 MMHG

## 2018-10-29 DIAGNOSIS — R19.7 DIARRHEA, UNSPECIFIED: ICD-10-CM

## 2018-10-29 PROCEDURE — 99213 OFFICE O/P EST LOW 20 MIN: CPT

## 2018-10-31 NOTE — HISTORY OF PRESENT ILLNESS
[FreeTextEntry8] : Pt c/o nausea and R sided abd pain x 1 week. She has had multiple episodes of this type of pain in the past year. Multiple CT scans have been wnl, HIDA scan earlier this year was wnl. Pt has been eating less as her appetite has decreased with the pain. Pt took advil and tylenol which hasn't helped. Pain is severe enough to keep pt up at night. Denies fever. Pt had her menses 2 weeks ago. Pt has hx of cystic R ovary has f/u with gyn.

## 2018-10-31 NOTE — ASSESSMENT
[FreeTextEntry1] : acute on chronic abdominal pain, diarrhea - check stool cx, ova/parasite, cdiff. diclofenac prn pain. f/u with gyn, GI

## 2018-11-01 LAB
C DIFF TOX GENS STL QL NAA+PROBE: NORMAL
CDIFF BY PCR: NOT DETECTED
DEPRECATED O AND P PREP STL: NORMAL

## 2018-11-02 LAB — BACTERIA STL CULT: NORMAL

## 2018-12-05 ENCOUNTER — TRANSCRIPTION ENCOUNTER (OUTPATIENT)
Age: 23
End: 2018-12-05

## 2019-02-05 ENCOUNTER — APPOINTMENT (OUTPATIENT)
Dept: FAMILY MEDICINE | Facility: CLINIC | Age: 24
End: 2019-02-05
Payer: MEDICAID

## 2019-02-05 VITALS
OXYGEN SATURATION: 99 % | HEIGHT: 60 IN | SYSTOLIC BLOOD PRESSURE: 126 MMHG | HEART RATE: 120 BPM | WEIGHT: 215 LBS | BODY MASS INDEX: 42.21 KG/M2 | DIASTOLIC BLOOD PRESSURE: 80 MMHG | RESPIRATION RATE: 16 BRPM

## 2019-02-05 PROCEDURE — 99213 OFFICE O/P EST LOW 20 MIN: CPT

## 2019-02-05 NOTE — PHYSICAL EXAM
[Well Developed] : well developed [Well Nourished] : well nourished [de-identified] : Walking with limp, able to weight bear but does not want to bend knee when walking. Left knee tender on palpation, slight edema  above knee.  Pain with flexion and extension.

## 2019-02-05 NOTE — ASSESSMENT
[FreeTextEntry1] : Spoke with grandfather. He had only noticed her limping a bit yesterday and much worse today. Confirms there was not injury. \par Agrees with plan for ice and Tylenol or ibuprofen.

## 2019-02-05 NOTE — HISTORY OF PRESENT ILLNESS
[FreeTextEntry8] : Left knee pain for one day. She started limping and told her family her knee hurt. No injury or change in activity. She had been standing more yesterday.\par She feels her knee is swollen. Ice once and ibuprofen but no relief of pain.

## 2019-02-07 ENCOUNTER — FORM ENCOUNTER (OUTPATIENT)
Age: 24
End: 2019-02-07

## 2019-02-08 ENCOUNTER — APPOINTMENT (OUTPATIENT)
Dept: MRI IMAGING | Facility: CLINIC | Age: 24
End: 2019-02-08
Payer: MEDICAID

## 2019-02-08 ENCOUNTER — OUTPATIENT (OUTPATIENT)
Dept: OUTPATIENT SERVICES | Facility: HOSPITAL | Age: 24
LOS: 1 days | End: 2019-02-08
Payer: MEDICAID

## 2019-02-08 DIAGNOSIS — Z98.890 OTHER SPECIFIED POSTPROCEDURAL STATES: Chronic | ICD-10-CM

## 2019-02-08 DIAGNOSIS — G93.2 BENIGN INTRACRANIAL HYPERTENSION: Chronic | ICD-10-CM

## 2019-02-08 DIAGNOSIS — M25.562 PAIN IN LEFT KNEE: ICD-10-CM

## 2019-02-08 DIAGNOSIS — Z00.8 ENCOUNTER FOR OTHER GENERAL EXAMINATION: ICD-10-CM

## 2019-02-08 PROCEDURE — 73721 MRI JNT OF LWR EXTRE W/O DYE: CPT | Mod: 26,LT

## 2019-02-08 PROCEDURE — 73721 MRI JNT OF LWR EXTRE W/O DYE: CPT

## 2019-02-21 ENCOUNTER — APPOINTMENT (OUTPATIENT)
Dept: FAMILY MEDICINE | Facility: CLINIC | Age: 24
End: 2019-02-21

## 2019-02-25 ENCOUNTER — APPOINTMENT (OUTPATIENT)
Dept: FAMILY MEDICINE | Facility: CLINIC | Age: 24
End: 2019-02-25
Payer: MEDICAID

## 2019-02-25 VITALS
SYSTOLIC BLOOD PRESSURE: 122 MMHG | OXYGEN SATURATION: 99 % | BODY MASS INDEX: 42.06 KG/M2 | HEART RATE: 75 BPM | DIASTOLIC BLOOD PRESSURE: 70 MMHG | WEIGHT: 214.25 LBS | RESPIRATION RATE: 16 BRPM | HEIGHT: 60 IN

## 2019-02-25 DIAGNOSIS — Z13.0 ENCOUNTER FOR SCREENING FOR DISEASES OF THE BLOOD AND BLOOD-FORMING ORGANS AND CERTAIN DISORDERS INVOLVING THE IMMUNE MECHANISM: ICD-10-CM

## 2019-02-25 DIAGNOSIS — R79.89 OTHER SPECIFIED ABNORMAL FINDINGS OF BLOOD CHEMISTRY: ICD-10-CM

## 2019-02-25 PROCEDURE — 90715 TDAP VACCINE 7 YRS/> IM: CPT

## 2019-02-25 PROCEDURE — 90471 IMMUNIZATION ADMIN: CPT

## 2019-02-25 PROCEDURE — 99395 PREV VISIT EST AGE 18-39: CPT | Mod: 25

## 2019-02-25 RX ORDER — FAMOTIDINE 20 MG/1
20 TABLET, FILM COATED ORAL
Refills: 0 | Status: DISCONTINUED | COMMUNITY
End: 2019-02-25

## 2019-02-25 RX ORDER — DICLOFENAC SODIUM 75 MG/1
75 TABLET, DELAYED RELEASE ORAL TWICE DAILY
Qty: 1 | Refills: 0 | Status: DISCONTINUED | COMMUNITY
Start: 2018-10-29 | End: 2019-02-25

## 2019-02-27 NOTE — HISTORY OF PRESENT ILLNESS
[de-identified] : Pt in office for CPE. Pt has been feeling well. Abdominal issues have resolved w/ lowering of geodon dose to q day instead of bid. Pt has shizoaffective d/o sees psych Shonna Toro q month. Denies CP, palpitations, dyspnea, n/v.\par Nonsmoker\par ETOH use denies\par Drug use denies\par Exercises never\par Diet improved, has lost weight intentionally in past several months

## 2019-02-27 NOTE — ASSESSMENT
[FreeTextEntry1] : schizoaffective d/o - cont geodone, zoloft, xanax. f/u with psych\par elev prolactin - recheck levels\par hx of abnml TFTs - recheck TFTs\par Healthy diet and regular exercise regimen discussed w/ pt.\par Screening labs ordered\par Advised routine pap smear w/ gyn\par tdap administered, pt consent given before administration and after discussion of risks/benefits, pt tolerated well\par Any questions call office

## 2019-02-28 ENCOUNTER — TRANSCRIPTION ENCOUNTER (OUTPATIENT)
Age: 24
End: 2019-02-28

## 2019-03-01 LAB
ALBUMIN SERPL ELPH-MCNC: 4.1 G/DL
ALP BLD-CCNC: 65 U/L
ALT SERPL-CCNC: 11 U/L
ANION GAP SERPL CALC-SCNC: 9 MMOL/L
AST SERPL-CCNC: 12 U/L
BASOPHILS # BLD AUTO: 0.03 K/UL
BASOPHILS NFR BLD AUTO: 0.3 %
BILIRUB SERPL-MCNC: <0.2 MG/DL
BUN SERPL-MCNC: 9 MG/DL
CALCIUM SERPL-MCNC: 9.3 MG/DL
CHLORIDE SERPL-SCNC: 107 MMOL/L
CHOLEST SERPL-MCNC: 150 MG/DL
CHOLEST/HDLC SERPL: 3 RATIO
CO2 SERPL-SCNC: 22 MMOL/L
CREAT SERPL-MCNC: 0.76 MG/DL
EOSINOPHIL # BLD AUTO: 0.07 K/UL
EOSINOPHIL NFR BLD AUTO: 0.7 %
GLUCOSE SERPL-MCNC: 103 MG/DL
HBA1C MFR BLD HPLC: 5.3 %
HCT VFR BLD CALC: 42 %
HDLC SERPL-MCNC: 50 MG/DL
HGB BLD-MCNC: 12.9 G/DL
IMM GRANULOCYTES NFR BLD AUTO: 0.2 %
LDLC SERPL CALC-MCNC: 65 MG/DL
LYMPHOCYTES # BLD AUTO: 2.57 K/UL
LYMPHOCYTES NFR BLD AUTO: 27 %
MAN DIFF?: NORMAL
MCHC RBC-ENTMCNC: 26.6 PG
MCHC RBC-ENTMCNC: 30.7 GM/DL
MCV RBC AUTO: 86.6 FL
MONOCYTES # BLD AUTO: 0.46 K/UL
MONOCYTES NFR BLD AUTO: 4.8 %
NEUTROPHILS # BLD AUTO: 6.36 K/UL
NEUTROPHILS NFR BLD AUTO: 67 %
PLATELET # BLD AUTO: 362 K/UL
POTASSIUM SERPL-SCNC: 4.3 MMOL/L
PROLACTIN SERPL-MCNC: 5.4 NG/ML
PROT SERPL-MCNC: 6.7 G/DL
RBC # BLD: 4.85 M/UL
RBC # FLD: 16 %
SODIUM SERPL-SCNC: 138 MMOL/L
T4 FREE SERPL-MCNC: 1.4 NG/DL
TRIGL SERPL-MCNC: 174 MG/DL
TSH SERPL-ACNC: 1 UIU/ML
WBC # FLD AUTO: 9.51 K/UL

## 2019-03-12 ENCOUNTER — APPOINTMENT (OUTPATIENT)
Dept: FAMILY MEDICINE | Facility: CLINIC | Age: 24
End: 2019-03-12
Payer: MEDICAID

## 2019-03-12 PROCEDURE — 86580 TB INTRADERMAL TEST: CPT

## 2019-03-12 NOTE — HISTORY OF PRESENT ILLNESS
[de-identified] : PPD placed in left forearm. RTO in 48-72 hrs for read.\par Hearing and vision screening done for completion of forms.

## 2019-03-14 ENCOUNTER — APPOINTMENT (OUTPATIENT)
Dept: FAMILY MEDICINE | Facility: CLINIC | Age: 24
End: 2019-03-14

## 2019-03-14 VITALS
HEART RATE: 124 BPM | TEMPERATURE: 97.3 F | BODY MASS INDEX: 42.01 KG/M2 | SYSTOLIC BLOOD PRESSURE: 120 MMHG | HEIGHT: 60 IN | DIASTOLIC BLOOD PRESSURE: 84 MMHG | OXYGEN SATURATION: 98 % | WEIGHT: 214 LBS

## 2019-03-14 DIAGNOSIS — Z11.1 ENCOUNTER FOR SCREENING FOR RESPIRATORY TUBERCULOSIS: ICD-10-CM

## 2019-03-14 NOTE — HISTORY OF PRESENT ILLNESS
[de-identified] : Pt in office for PPD read. PPD read as negative, 0mm induration. Forms completed for pt

## 2019-04-10 ENCOUNTER — APPOINTMENT (OUTPATIENT)
Dept: FAMILY MEDICINE | Facility: CLINIC | Age: 24
End: 2019-04-10
Payer: MEDICAID

## 2019-04-10 VITALS
HEART RATE: 97 BPM | BODY MASS INDEX: 42.01 KG/M2 | DIASTOLIC BLOOD PRESSURE: 66 MMHG | HEIGHT: 60 IN | WEIGHT: 214 LBS | OXYGEN SATURATION: 98 % | SYSTOLIC BLOOD PRESSURE: 110 MMHG | TEMPERATURE: 98 F

## 2019-04-10 PROCEDURE — 69209 REMOVE IMPACTED EAR WAX UNI: CPT | Mod: 50

## 2019-04-10 PROCEDURE — 99213 OFFICE O/P EST LOW 20 MIN: CPT | Mod: 25

## 2019-04-10 NOTE — PHYSICAL EXAM
[No Acute Distress] : no acute distress [Well Nourished] : well nourished [Normal Sclera/Conjunctiva] : normal sclera/conjunctiva [Well Developed] : well developed [Supple] : supple [No Lymphadenopathy] : no lymphadenopathy [No Respiratory Distress] : no respiratory distress  [Clear to Auscultation] : lungs were clear to auscultation bilaterally [No Accessory Muscle Use] : no accessory muscle use [de-identified] : bilateral impacted cerumen

## 2019-04-17 ENCOUNTER — APPOINTMENT (OUTPATIENT)
Dept: FAMILY MEDICINE | Facility: CLINIC | Age: 24
End: 2019-04-17
Payer: MEDICAID

## 2019-04-17 VITALS
BODY MASS INDEX: 42.01 KG/M2 | OXYGEN SATURATION: 97 % | SYSTOLIC BLOOD PRESSURE: 106 MMHG | TEMPERATURE: 97.4 F | DIASTOLIC BLOOD PRESSURE: 70 MMHG | HEART RATE: 98 BPM | WEIGHT: 214 LBS | HEIGHT: 60 IN

## 2019-04-17 PROCEDURE — 99213 OFFICE O/P EST LOW 20 MIN: CPT

## 2019-04-17 RX ORDER — ZIPRASIDONE HYDROCHLORIDE 40 MG/1
40 CAPSULE ORAL
Qty: 30 | Refills: 0 | Status: COMPLETED | COMMUNITY
Start: 2018-11-19

## 2019-04-17 RX ORDER — ZIPRASIDONE HYDROCHLORIDE 80 MG/1
80 CAPSULE ORAL
Qty: 30 | Refills: 0 | Status: COMPLETED | COMMUNITY
Start: 2019-01-03

## 2019-04-17 RX ORDER — BENZTROPINE MESYLATE 0.5 MG/1
0.5 TABLET ORAL
Qty: 28 | Refills: 0 | Status: COMPLETED | COMMUNITY
Start: 2019-03-19

## 2019-04-18 NOTE — REVIEW OF SYSTEMS
[Fever] : no fever [Chills] : no chills [Headache] : headache [FreeTextEntry9] : as per HPI [de-identified] : as per HPI

## 2019-04-18 NOTE — PHYSICAL EXAM
[No Acute Distress] : no acute distress [Well Nourished] : well nourished [Well Developed] : well developed [Normal Appearance] : was normal in appearance [Clear to Auscultation] : lungs were clear to auscultation bilaterally [Neck Supple] : was supple [No Respiratory Distress] : no respiratory distress  [Normal Rate] : normal rate  [Regular Rhythm] : with a regular rhythm [No Murmur] : no murmur heard [Normal S1, S2] : normal S1 and S2 [No Spinal Tenderness] : no spinal tenderness [Normal Sclera/Conjunctiva] : normal sclera/conjunctiva [PERRL] : pupils equal round and reactive to light [Normal Oropharynx] : the oropharynx was normal [Normal TMs] : both tympanic membranes were normal [Normal Posterior Cervical Nodes] : no posterior cervical lymphadenopathy [No Focal Deficits] : no focal deficits [Normal Anterior Cervical Nodes] : no anterior cervical lymphadenopathy [de-identified] : tenderness to posterior neck with muscle tightness  [de-identified] : tenderness to upper back, cervical region with muscle tightness  [de-identified] : Alert

## 2019-04-18 NOTE — HISTORY OF PRESENT ILLNESS
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

## 2019-04-18 NOTE — ASSESSMENT
[FreeTextEntry1] : likely with cervicogenic headache\par take Ibuprofen when needed\par apply heat to affected area\par trial of physical therapy\par return or call if symptoms worsen or don't improve

## 2019-07-18 ENCOUNTER — APPOINTMENT (OUTPATIENT)
Dept: FAMILY MEDICINE | Facility: CLINIC | Age: 24
End: 2019-07-18
Payer: MEDICAID

## 2019-07-18 VITALS
TEMPERATURE: 98.6 F | WEIGHT: 213 LBS | RESPIRATION RATE: 16 BRPM | HEART RATE: 83 BPM | BODY MASS INDEX: 41.82 KG/M2 | HEIGHT: 60 IN | OXYGEN SATURATION: 98 % | SYSTOLIC BLOOD PRESSURE: 106 MMHG | DIASTOLIC BLOOD PRESSURE: 60 MMHG

## 2019-07-18 PROCEDURE — 99213 OFFICE O/P EST LOW 20 MIN: CPT

## 2019-08-28 ENCOUNTER — NON-APPOINTMENT (OUTPATIENT)
Age: 24
End: 2019-08-28

## 2019-08-28 ENCOUNTER — APPOINTMENT (OUTPATIENT)
Dept: FAMILY MEDICINE | Facility: CLINIC | Age: 24
End: 2019-08-28
Payer: MEDICAID

## 2019-08-28 VITALS
OXYGEN SATURATION: 99 % | BODY MASS INDEX: 41.52 KG/M2 | HEIGHT: 60 IN | WEIGHT: 211.5 LBS | HEART RATE: 115 BPM | DIASTOLIC BLOOD PRESSURE: 68 MMHG | SYSTOLIC BLOOD PRESSURE: 122 MMHG

## 2019-08-28 VITALS — RESPIRATION RATE: 16 BRPM | HEART RATE: 92 BPM

## 2019-08-28 PROCEDURE — 99214 OFFICE O/P EST MOD 30 MIN: CPT | Mod: 25

## 2019-08-28 PROCEDURE — 93000 ELECTROCARDIOGRAM COMPLETE: CPT

## 2019-08-28 NOTE — REVIEW OF SYSTEMS
[Chest Pain] : chest pain [Shortness Of Breath] : shortness of breath [Fever] : no fever [Chills] : no chills [Palpitations] : no palpitations [Abdominal Pain] : no abdominal pain [Cough] : no cough

## 2019-08-28 NOTE — PHYSICAL EXAM
[No Acute Distress] : no acute distress [Well Nourished] : well nourished [Well Developed] : well developed [Normal Appearance] : was normal in appearance [Neck Supple] : was supple [No Respiratory Distress] : no respiratory distress  [Normal Rate] : normal rate  [Clear to Auscultation] : lungs were clear to auscultation bilaterally [Regular Rhythm] : with a regular rhythm [No Murmur] : no murmur heard [Normal S1, S2] : normal S1 and S2 [No Edema] : there was no peripheral edema [Normal Anterior Cervical Nodes] : no anterior cervical lymphadenopathy [Alert and Oriented x3] : oriented to person, place, and time [de-identified] : Obese [de-identified] : tenderness to anterior chest

## 2019-08-28 NOTE — HISTORY OF PRESENT ILLNESS
[FreeTextEntry8] : \par reports experiencing episodes of sharp chest pain- non radiating\par can last for several hours and then resolve on its own\par associated with shortness of breath\par no fever, no cough\par denies feeling anxious when pain occurs \par was seen in the office last month- tried on Albuterol inhaler which has not helped

## 2019-08-28 NOTE — ASSESSMENT
[FreeTextEntry1] : likely muscular/costochondritis - Ibuprofen for pain control\par check blood work\par check chest x-ray\par see cardiology \par

## 2019-08-30 LAB
ALBUMIN SERPL ELPH-MCNC: 4 G/DL
ALP BLD-CCNC: 60 U/L
ALT SERPL-CCNC: 13 U/L
ANION GAP SERPL CALC-SCNC: 14 MMOL/L
AST SERPL-CCNC: 14 U/L
BASOPHILS # BLD AUTO: 0.03 K/UL
BASOPHILS NFR BLD AUTO: 0.5 %
BILIRUB SERPL-MCNC: 0.2 MG/DL
BUN SERPL-MCNC: 9 MG/DL
CALCIUM SERPL-MCNC: 9.7 MG/DL
CHLORIDE SERPL-SCNC: 104 MMOL/L
CO2 SERPL-SCNC: 21 MMOL/L
CREAT SERPL-MCNC: 0.84 MG/DL
EOSINOPHIL # BLD AUTO: 0.1 K/UL
EOSINOPHIL NFR BLD AUTO: 1.6 %
GLUCOSE SERPL-MCNC: 100 MG/DL
HCT VFR BLD CALC: 45 %
HGB BLD-MCNC: 14 G/DL
IMM GRANULOCYTES NFR BLD AUTO: 0.2 %
LYMPHOCYTES # BLD AUTO: 2.02 K/UL
LYMPHOCYTES NFR BLD AUTO: 32.6 %
MAN DIFF?: NORMAL
MCHC RBC-ENTMCNC: 27 PG
MCHC RBC-ENTMCNC: 31.1 GM/DL
MCV RBC AUTO: 86.9 FL
MONOCYTES # BLD AUTO: 0.41 K/UL
MONOCYTES NFR BLD AUTO: 6.6 %
NEUTROPHILS # BLD AUTO: 3.62 K/UL
NEUTROPHILS NFR BLD AUTO: 58.5 %
PLATELET # BLD AUTO: 305 K/UL
POTASSIUM SERPL-SCNC: 4.5 MMOL/L
PROT SERPL-MCNC: 7.1 G/DL
RBC # BLD: 5.18 M/UL
RBC # FLD: 14.8 %
SODIUM SERPL-SCNC: 139 MMOL/L
WBC # FLD AUTO: 6.19 K/UL

## 2019-10-18 ENCOUNTER — APPOINTMENT (OUTPATIENT)
Dept: FAMILY MEDICINE | Facility: CLINIC | Age: 24
End: 2019-10-18
Payer: MEDICAID

## 2019-10-18 VITALS
BODY MASS INDEX: 41.43 KG/M2 | OXYGEN SATURATION: 98 % | HEIGHT: 60 IN | TEMPERATURE: 98 F | DIASTOLIC BLOOD PRESSURE: 70 MMHG | WEIGHT: 211 LBS | HEART RATE: 106 BPM | SYSTOLIC BLOOD PRESSURE: 128 MMHG | RESPIRATION RATE: 16 BRPM

## 2019-10-18 PROCEDURE — 69209 REMOVE IMPACTED EAR WAX UNI: CPT | Mod: LT

## 2019-10-18 PROCEDURE — 99214 OFFICE O/P EST MOD 30 MIN: CPT | Mod: 25

## 2019-10-18 NOTE — ASSESSMENT
[FreeTextEntry1] : Left cerumen disimpaction performed using cerumen spoon and water and hydrogen peroxide lavage with patient's informed consent after discussion of possible risks and benefits. Pt tolerated well\par L otitis media - start course of augmentin\par R abd pain - check pelvic US to f/u on R ovarian cyst

## 2019-10-18 NOTE — HISTORY OF PRESENT ILLNESS
[FreeTextEntry8] : Pt c/o 2 weeks of R abdominal pain and ears feeling clogged L>R w/ L ear pain. Pt has hx of repeated CT scans which were equivocal in the past. Pt was found to have R ovarian cyst. Pt still passing BMs regularly. Denies fever, chills, abnormal or bloody stools.

## 2019-10-21 ENCOUNTER — FORM ENCOUNTER (OUTPATIENT)
Age: 24
End: 2019-10-21

## 2019-10-22 ENCOUNTER — APPOINTMENT (OUTPATIENT)
Dept: ULTRASOUND IMAGING | Facility: CLINIC | Age: 24
End: 2019-10-22
Payer: MEDICAID

## 2019-10-22 ENCOUNTER — OUTPATIENT (OUTPATIENT)
Dept: OUTPATIENT SERVICES | Facility: HOSPITAL | Age: 24
LOS: 1 days | End: 2019-10-22
Payer: MEDICAID

## 2019-10-22 DIAGNOSIS — Z98.890 OTHER SPECIFIED POSTPROCEDURAL STATES: Chronic | ICD-10-CM

## 2019-10-22 DIAGNOSIS — G93.2 BENIGN INTRACRANIAL HYPERTENSION: Chronic | ICD-10-CM

## 2019-10-22 DIAGNOSIS — Z00.8 ENCOUNTER FOR OTHER GENERAL EXAMINATION: ICD-10-CM

## 2019-10-22 DIAGNOSIS — R10.9 UNSPECIFIED ABDOMINAL PAIN: ICD-10-CM

## 2019-10-22 PROCEDURE — 76856 US EXAM PELVIC COMPLETE: CPT | Mod: 26

## 2019-10-22 PROCEDURE — 76856 US EXAM PELVIC COMPLETE: CPT

## 2019-10-29 ENCOUNTER — APPOINTMENT (OUTPATIENT)
Dept: FAMILY MEDICINE | Facility: CLINIC | Age: 24
End: 2019-10-29
Payer: MEDICAID

## 2019-10-29 VITALS
SYSTOLIC BLOOD PRESSURE: 120 MMHG | DIASTOLIC BLOOD PRESSURE: 70 MMHG | OXYGEN SATURATION: 98 % | HEIGHT: 60 IN | BODY MASS INDEX: 46.13 KG/M2 | WEIGHT: 235 LBS | RESPIRATION RATE: 16 BRPM | HEART RATE: 94 BPM

## 2019-10-29 PROCEDURE — 99214 OFFICE O/P EST MOD 30 MIN: CPT

## 2019-11-01 ENCOUNTER — APPOINTMENT (OUTPATIENT)
Dept: FAMILY MEDICINE | Facility: CLINIC | Age: 24
End: 2019-11-01
Payer: MEDICAID

## 2019-11-01 VITALS
TEMPERATURE: 98.4 F | SYSTOLIC BLOOD PRESSURE: 120 MMHG | BODY MASS INDEX: 42.09 KG/M2 | HEART RATE: 93 BPM | RESPIRATION RATE: 16 BRPM | DIASTOLIC BLOOD PRESSURE: 70 MMHG | WEIGHT: 214.38 LBS | OXYGEN SATURATION: 98 % | HEIGHT: 60 IN

## 2019-11-01 LAB
ANION GAP SERPL CALC-SCNC: 12 MMOL/L
BUN SERPL-MCNC: 8 MG/DL
CALCIUM SERPL-MCNC: 9.4 MG/DL
CHLORIDE SERPL-SCNC: 105 MMOL/L
CK SERPL-CCNC: 49 U/L
CO2 SERPL-SCNC: 23 MMOL/L
CREAT SERPL-MCNC: 0.75 MG/DL
GLUCOSE SERPL-MCNC: 88 MG/DL
POTASSIUM SERPL-SCNC: 4.5 MMOL/L
SODIUM SERPL-SCNC: 140 MMOL/L

## 2019-11-01 PROCEDURE — 99214 OFFICE O/P EST MOD 30 MIN: CPT

## 2019-11-01 NOTE — HISTORY OF PRESENT ILLNESS
[de-identified] : Pt for f/u chronic abd pain w/ episodes of nausea and vomiting. Pt's mother suspects she is having pains due to apri adverse effects. Pt also c/o myalgias in b/l LE. Pt drinks enough fluids daily approx 50-60oz water/day.

## 2019-11-01 NOTE — ASSESSMENT
[FreeTextEntry1] : thigh pain - likely quadriceps tendinitis or cramping. Check bmp, CK\par chronic abd pain, nausea - start zofran prn. possible medication AE? trial off apri, if no improvement pt to f/u with psych for possible medication change

## 2019-11-01 NOTE — PHYSICAL EXAM
[No Focal Deficits] : no focal deficits [Normal] : affect was normal and insight and judgment were intact [de-identified] : distal thigh pain

## 2019-11-19 ENCOUNTER — OUTPATIENT (OUTPATIENT)
Dept: OUTPATIENT SERVICES | Facility: HOSPITAL | Age: 24
LOS: 1 days | Discharge: HOME | End: 2019-11-19

## 2019-11-19 ENCOUNTER — APPOINTMENT (OUTPATIENT)
Dept: OTOLARYNGOLOGY | Facility: CLINIC | Age: 24
End: 2019-11-19
Payer: MEDICAID

## 2019-11-19 VITALS
HEART RATE: 94 BPM | DIASTOLIC BLOOD PRESSURE: 87 MMHG | WEIGHT: 215 LBS | SYSTOLIC BLOOD PRESSURE: 138 MMHG | HEIGHT: 60 IN | BODY MASS INDEX: 42.21 KG/M2

## 2019-11-19 DIAGNOSIS — G93.2 BENIGN INTRACRANIAL HYPERTENSION: Chronic | ICD-10-CM

## 2019-11-19 DIAGNOSIS — K11.8 OTHER DISEASES OF SALIVARY GLANDS: ICD-10-CM

## 2019-11-19 DIAGNOSIS — Z98.890 OTHER SPECIFIED POSTPROCEDURAL STATES: Chronic | ICD-10-CM

## 2019-11-19 PROCEDURE — 99213 OFFICE O/P EST LOW 20 MIN: CPT

## 2019-11-19 RX ORDER — AMOXICILLIN AND CLAVULANATE POTASSIUM 875; 125 MG/1; MG/1
875-125 TABLET, COATED ORAL
Qty: 20 | Refills: 0 | Status: DISCONTINUED | COMMUNITY
Start: 2019-10-18 | End: 2019-11-19

## 2019-11-19 RX ORDER — ALBUTEROL SULFATE 90 UG/1
108 (90 BASE) AEROSOL, METERED RESPIRATORY (INHALATION)
Qty: 1 | Refills: 3 | Status: DISCONTINUED | COMMUNITY
Start: 2019-07-18 | End: 2019-11-19

## 2019-11-19 RX ORDER — ONDANSETRON 8 MG/1
8 TABLET, ORALLY DISINTEGRATING ORAL
Qty: 30 | Refills: 1 | Status: DISCONTINUED | COMMUNITY
Start: 2019-11-01 | End: 2019-11-19

## 2019-11-19 NOTE — CONSULT LETTER
[Dear  ___] : Dear  [unfilled], [Courtesy Letter:] : I had the pleasure of seeing your patient, [unfilled], in my office today. [Please see my note below.] : Please see my note below. [Consult Closing:] : Thank you very much for allowing me to participate in the care of this patient.  If you have any questions, please do not hesitate to contact me. [Sincerely,] : Sincerely, [FreeTextEntry2] : John Anthony Reyes, MD [FreeTextEntry3] : Ronald Gonzalez MD, FACS\par Clinical \par Dept. of Otolaryngology\par [facility]\par

## 2019-11-19 NOTE — HISTORY OF PRESENT ILLNESS
[de-identified] : 24 year old female follow up for parotid mass, history of Left parotidectomy 9/12/18 s/p suture removal.  Mother reports intermittent redness at surgical site.  Denies trauma/injury to face or head.  Patient reports Left otalgia and Left neck discomfort, symptoms present for the past few months.  Mother reports Left ear infection 1 month ago, prescribed oral antibiotics with good relief.  Denies dysphagia, sore throat, odynophagia, bleeding, fevers and throat infections in the past year.  Tolerating eating and drinking with no issues. Unsure of new onset of mass/lump.

## 2019-11-19 NOTE — REASON FOR VISIT
[Subsequent Evaluation] : a subsequent evaluation for [Parent] : parent [FreeTextEntry2] : follow up for parotid mass, history of Left parotidectomy 9/12/18 s/p suture removal.

## 2019-11-19 NOTE — PHYSICAL EXAM
[FreeTextEntry1] : Overweight [de-identified] : Slight keloid formation at scar around earlobe.  No masses palpable. [Midline] : trachea located in midline position [Normal] : orientation to person, place, and time: normal [de-identified] : No evidence of inflammation at the surgical site.   [de-identified] : Facial nerve 100% functional

## 2019-11-20 DIAGNOSIS — K11.8 OTHER DISEASES OF SALIVARY GLANDS: ICD-10-CM

## 2019-12-12 ENCOUNTER — FORM ENCOUNTER (OUTPATIENT)
Age: 24
End: 2019-12-12

## 2019-12-13 ENCOUNTER — OUTPATIENT (OUTPATIENT)
Dept: OUTPATIENT SERVICES | Facility: HOSPITAL | Age: 24
LOS: 1 days | End: 2019-12-13
Payer: MEDICAID

## 2019-12-13 ENCOUNTER — APPOINTMENT (OUTPATIENT)
Dept: RADIOLOGY | Facility: CLINIC | Age: 24
End: 2019-12-13
Payer: MEDICAID

## 2019-12-13 ENCOUNTER — APPOINTMENT (OUTPATIENT)
Dept: FAMILY MEDICINE | Facility: CLINIC | Age: 24
End: 2019-12-13
Payer: MEDICAID

## 2019-12-13 VITALS
RESPIRATION RATE: 16 BRPM | BODY MASS INDEX: 43.39 KG/M2 | OXYGEN SATURATION: 98 % | SYSTOLIC BLOOD PRESSURE: 120 MMHG | HEIGHT: 60 IN | DIASTOLIC BLOOD PRESSURE: 70 MMHG | TEMPERATURE: 98.1 F | HEART RATE: 94 BPM | WEIGHT: 221 LBS

## 2019-12-13 DIAGNOSIS — Z98.890 OTHER SPECIFIED POSTPROCEDURAL STATES: Chronic | ICD-10-CM

## 2019-12-13 DIAGNOSIS — R06.83 SNORING: ICD-10-CM

## 2019-12-13 DIAGNOSIS — G93.2 BENIGN INTRACRANIAL HYPERTENSION: Chronic | ICD-10-CM

## 2019-12-13 PROCEDURE — 71046 X-RAY EXAM CHEST 2 VIEWS: CPT

## 2019-12-13 PROCEDURE — 71046 X-RAY EXAM CHEST 2 VIEWS: CPT | Mod: 26

## 2019-12-13 PROCEDURE — 99213 OFFICE O/P EST LOW 20 MIN: CPT

## 2019-12-13 RX ORDER — DESOGESTREL AND ETHINYL ESTRADIOL 0.15-0.03
0.15-3 KIT ORAL DAILY
Refills: 0 | Status: DISCONTINUED | COMMUNITY
End: 2019-12-13

## 2019-12-14 NOTE — PHYSICAL EXAM
[Normal] : no respiratory distress, lungs were clear to auscultation bilaterally and no accessory muscle use [Soft] : abdomen soft [Normal Affect] : the affect was normal [Non Tender] : non-tender [No Masses] : no abdominal mass palpated [de-identified] : obese

## 2019-12-14 NOTE — HISTORY OF PRESENT ILLNESS
[FreeTextEntry8] : Pt c/o shortness of breath at nighttime. Pt feels chest gets "tight." Pt also gets this chest tightness throughout the day. Denies wheezing, fever, chills, cough. Pt's mother reports snoring when pt sleeps. Pt does report daytime fatigue and somnolence.

## 2019-12-14 NOTE — ASSESSMENT
[FreeTextEntry1] : dyspnea, snoring - check CXR. if wnl refer to pulm for further eval. sleep study also ordered. advised weight loss

## 2020-01-28 ENCOUNTER — APPOINTMENT (OUTPATIENT)
Dept: FAMILY MEDICINE | Facility: CLINIC | Age: 25
End: 2020-01-28
Payer: MEDICAID

## 2020-01-28 VITALS
HEIGHT: 60 IN | OXYGEN SATURATION: 94 % | HEART RATE: 84 BPM | BODY MASS INDEX: 44.17 KG/M2 | SYSTOLIC BLOOD PRESSURE: 124 MMHG | DIASTOLIC BLOOD PRESSURE: 70 MMHG | WEIGHT: 225 LBS | TEMPERATURE: 98.8 F

## 2020-01-28 DIAGNOSIS — H60.91 UNSPECIFIED OTITIS EXTERNA, RIGHT EAR: ICD-10-CM

## 2020-01-28 PROCEDURE — 99214 OFFICE O/P EST MOD 30 MIN: CPT

## 2020-02-08 ENCOUNTER — OUTPATIENT (OUTPATIENT)
Dept: OUTPATIENT SERVICES | Facility: HOSPITAL | Age: 25
LOS: 1 days | End: 2020-02-08
Payer: COMMERCIAL

## 2020-02-08 DIAGNOSIS — Z98.890 OTHER SPECIFIED POSTPROCEDURAL STATES: Chronic | ICD-10-CM

## 2020-02-08 DIAGNOSIS — G93.2 BENIGN INTRACRANIAL HYPERTENSION: Chronic | ICD-10-CM

## 2020-02-08 DIAGNOSIS — G47.33 OBSTRUCTIVE SLEEP APNEA (ADULT) (PEDIATRIC): ICD-10-CM

## 2020-02-08 PROCEDURE — 95810 POLYSOM 6/> YRS 4/> PARAM: CPT | Mod: 26

## 2020-02-08 PROCEDURE — 95810 POLYSOM 6/> YRS 4/> PARAM: CPT

## 2020-02-27 ENCOUNTER — APPOINTMENT (OUTPATIENT)
Dept: FAMILY MEDICINE | Facility: CLINIC | Age: 25
End: 2020-02-27
Payer: MEDICAID

## 2020-02-27 VITALS
DIASTOLIC BLOOD PRESSURE: 60 MMHG | OXYGEN SATURATION: 98 % | SYSTOLIC BLOOD PRESSURE: 110 MMHG | HEIGHT: 60 IN | WEIGHT: 225 LBS | BODY MASS INDEX: 44.17 KG/M2 | HEART RATE: 106 BPM | RESPIRATION RATE: 16 BRPM

## 2020-02-27 PROCEDURE — 69209 REMOVE IMPACTED EAR WAX UNI: CPT

## 2020-02-27 PROCEDURE — 99214 OFFICE O/P EST MOD 30 MIN: CPT | Mod: 25

## 2020-03-02 ENCOUNTER — APPOINTMENT (OUTPATIENT)
Dept: PULMONOLOGY | Facility: CLINIC | Age: 25
End: 2020-03-02
Payer: MEDICAID

## 2020-03-02 VITALS
SYSTOLIC BLOOD PRESSURE: 116 MMHG | BODY MASS INDEX: 43.05 KG/M2 | DIASTOLIC BLOOD PRESSURE: 64 MMHG | HEIGHT: 61 IN | OXYGEN SATURATION: 97 % | WEIGHT: 228 LBS | HEART RATE: 114 BPM

## 2020-03-02 PROCEDURE — 94010 BREATHING CAPACITY TEST: CPT

## 2020-03-02 PROCEDURE — 99204 OFFICE O/P NEW MOD 45 MIN: CPT | Mod: 25

## 2020-03-02 PROCEDURE — 85018 HEMOGLOBIN: CPT | Mod: QW

## 2020-03-02 PROCEDURE — 94727 GAS DIL/WSHOT DETER LNG VOL: CPT

## 2020-03-02 PROCEDURE — 94729 DIFFUSING CAPACITY: CPT

## 2020-03-02 RX ORDER — MULTIVITAMIN
TABLET ORAL
Refills: 0 | Status: DISCONTINUED | COMMUNITY
End: 2020-03-02

## 2020-03-02 RX ORDER — AMOXICILLIN AND CLAVULANATE POTASSIUM 875; 125 MG/1; MG/1
875-125 TABLET, COATED ORAL
Qty: 20 | Refills: 0 | Status: DISCONTINUED | COMMUNITY
Start: 2020-01-28 | End: 2020-03-02

## 2020-03-02 RX ORDER — ZIPRASIDONE HYDROCHLORIDE 20 MG/1
20 CAPSULE ORAL
Qty: 30 | Refills: 0 | Status: DISCONTINUED | COMMUNITY
Start: 2020-01-02 | End: 2020-03-02

## 2020-03-02 RX ORDER — AMOXICILLIN 500 MG/1
500 TABLET, FILM COATED ORAL
Qty: 38 | Refills: 0 | Status: DISCONTINUED | COMMUNITY
Start: 2019-08-03 | End: 2020-03-02

## 2020-04-09 ENCOUNTER — APPOINTMENT (OUTPATIENT)
Dept: FAMILY MEDICINE | Facility: CLINIC | Age: 25
End: 2020-04-09
Payer: MEDICAID

## 2020-04-09 PROCEDURE — 99213 OFFICE O/P EST LOW 20 MIN: CPT | Mod: 95

## 2020-04-14 NOTE — ASSESSMENT
[FreeTextEntry1] : acute pharyngitis - will tx w/ prophylactic zpak. start lidocaine gargles prn. call office if worsening symptoms.\par \par I have spent 15 minutes of face to face time during this encounter with patient. >50% of time was spent counseling and/or coordinating care for above problems.\par

## 2020-04-14 NOTE — PHYSICAL EXAM
[No Acute Distress] : no acute distress [Well Developed] : well developed [Normal Sclera/Conjunctiva] : normal sclera/conjunctiva [Normal Outer Ear/Nose] : the outer ears and nose were normal in appearance [No Respiratory Distress] : no respiratory distress  [No Accessory Muscle Use] : no accessory muscle use [Normal] : affect was normal and insight and judgment were intact [de-identified] : no facial rash

## 2020-04-14 NOTE — HISTORY OF PRESENT ILLNESS
[Home] : at home, [unfilled] , at the time of the visit. [Medical Office: ([facility])___] : at ~his/her~ medical office located [place] [Mother] : mother [FreeTextEntry2] : Kathy White [FreeTextEntry3] : mother [FreeTextEntry8] : Pt c/o sore throat and fever up to 100.4F since last night. Pt also has midl dry cough. Pt took tylenol which has been keeping temp around 99.6. Pt's brother had URI last week. Pt has no positive COVID exposure. Denies CP, palpitations, dyspnea, n/v

## 2020-05-05 ENCOUNTER — APPOINTMENT (OUTPATIENT)
Dept: PULMONOLOGY | Facility: CLINIC | Age: 25
End: 2020-05-05
Payer: MEDICAID

## 2020-05-05 PROCEDURE — 99214 OFFICE O/P EST MOD 30 MIN: CPT | Mod: 95

## 2020-05-05 NOTE — PROCEDURE
[FreeTextEntry1] : Prior PFTs with mildly reduced FVC and moderately reduced FEV1 without an obstructive pattern. Lung volumes with elevated RV c/w gas trapping and diffusion capacity is moderately reduced but corrected for alveolar volume.

## 2020-05-05 NOTE — PHYSICAL EXAM
[No Acute Distress] : no acute distress [Normal Appearance] : normal appearance [Supple] : supple [No Resp Distress] : no resp distress [Oriented x3] : oriented x3 [TextBox_2] : Pt is obese

## 2020-05-05 NOTE — CONSULT LETTER
[Consult Letter:] : I had the pleasure of evaluating your patient, [unfilled]. [Please see my note below.] : Please see my note below. [Dear  ___] : Dear  [unfilled], [Sincerely,] : Sincerely, [Consult Closing:] : Thank you very much for allowing me to participate in the care of this patient.  If you have any questions, please do not hesitate to contact me. [FreeTextEntry3] : Gage Avila MD, FCCP, D. ABSM\par Pulmonary and Sleep Medicine\par [facility] [place]

## 2020-05-05 NOTE — DISCUSSION/SUMMARY
[FreeTextEntry1] : \par #1. Pt with mild asthma for which she is maintained on Asmanex which I renewed\par #2. ProAir as needed was started\par #3. Pt with at least mild CECILIA with an AHI of 6.2 but may be an underestimation\par #4. Encouraged autoCPAP therapy\par #5. Diet and exercise for weight loss is imperative\par #6. F/u in 2 months with Dr. Dale

## 2020-05-05 NOTE — HISTORY OF PRESENT ILLNESS
[Home] : at home, [unfilled] , at the time of the visit. [Patient] : the patient [Self] : self [Medical Office: ([facility])___] : at the medical office located in  [Witnessed Apnea During Sleep] : witnessed apnea during sleep [Excessive Daytime Sleepiness] : excessive daytime sleepiness [Snoring] : snoring [Witnessed Gasping During Sleep] : witnessed gasping during sleep [Unrefreshing Sleep] : unrefreshing sleep [Sleepy When Sedentary] : sleepy when sedentary [Poor Compliance] : poor compliance with treatment [Poor Tolerance] : poor tolerance of treatment [Initial Evaluation] : an initial evaluation of [Excess Weight] : excess weight [Dyspnea] : dyspnea [Low Calorie Diet] : low calorie diet [Fair Compliance] : fair compliance with treatment [Fair Tolerance] : fair tolerance of treatment [Poor Symptom Control] : poor symptom control [Sleep Apnea] : sleep apnea [High] : high [Low Calorie] : low calorie [Well Balanced Diet] : well balanced meals [Infrequently] : exercises infrequently [Aerobic Conditioning] : aerobic conditioning [Walking] : walking [de-identified] : autoCPAP [Mild Persistent] : mild persistent [Follow-Up - Routine Clinic] : a routine clinic follow-up of [Dyspnea on Exertion] : dyspnea on exertion [Currently Experiencing] : The patient is currently experiencing symptoms. [None] : No associated symptoms are reported [Inhaled Corticosteroids] : inhaled corticosteroids [Good Tolerance] : good tolerance of treatment [Good Compliance] : good compliance with treatment [Fair Symptom Control] : fair symptom control

## 2020-05-05 NOTE — REVIEW OF SYSTEMS
[SOB on Exertion] : sob on exertion [Fever] : no fever [Chills] : no chills [Dry Eyes] : no dry eyes [Epistaxis] : no epistaxis [Eye Irritation] : no eye irritation [Postnasal Drip] : no postnasal drip [Nasal Congestion] : no nasal congestion [Cough] : no cough [Chest Tightness] : no chest tightness [Sinus Problems] : no sinus problems [Dyspnea] : no dyspnea [Sputum] : no sputum [Pleuritic Pain] : no pleuritic pain [Chest Discomfort] : no chest discomfort [Wheezing] : no wheezing [Palpitations] : no palpitations [Edema] : no edema [Hay Fever] : no hay fever [Itchy Eyes] : no itchy eyes [Syncope] : no syncope [Seasonal Allergies] : no seasonal allergies [Abdominal Pain] : no abdominal pain [GERD] : no gerd [Vomiting] : no vomiting [Diarrhea] : no diarrhea [Nausea] : no nausea [Constipation] : no constipation [Back Pain] : no back pain [Dysuria] : no dysuria [Anemia] : no anemia [Headache] : no headache [Seizures] : no seizures [Paralysis] : no paralysis [Numbness] : no numbness [Dizziness] : no dizziness [Confusion] : no confusion [Diabetes] : no diabetes [TextBox_137] : Special needs [Thyroid Problem] : no thyroid problem

## 2020-06-11 ENCOUNTER — TRANSCRIPTION ENCOUNTER (OUTPATIENT)
Age: 25
End: 2020-06-11

## 2020-06-19 ENCOUNTER — APPOINTMENT (OUTPATIENT)
Dept: PHYSICAL MEDICINE AND REHAB | Facility: CLINIC | Age: 25
End: 2020-06-19
Payer: MEDICAID

## 2020-06-19 VITALS
HEIGHT: 61 IN | WEIGHT: 228 LBS | SYSTOLIC BLOOD PRESSURE: 110 MMHG | DIASTOLIC BLOOD PRESSURE: 76 MMHG | HEART RATE: 91 BPM | TEMPERATURE: 97.7 F | BODY MASS INDEX: 43.05 KG/M2

## 2020-06-19 PROCEDURE — 99202 OFFICE O/P NEW SF 15 MIN: CPT

## 2020-06-19 RX ORDER — LIDOCAINE HYDROCHLORIDE 20 MG/ML
2 SOLUTION ORAL; TOPICAL
Qty: 1 | Refills: 0 | Status: DISCONTINUED | COMMUNITY
Start: 2020-04-09 | End: 2020-06-19

## 2020-06-19 RX ORDER — AZITHROMYCIN 250 MG/1
250 TABLET, FILM COATED ORAL
Qty: 1 | Refills: 0 | Status: DISCONTINUED | COMMUNITY
Start: 2020-04-09 | End: 2020-06-19

## 2020-06-19 NOTE — ASSESSMENT
[FreeTextEntry1] : 24 y.o. F w/ right lateral ankle sprain.  Advised OTC Voltaren gel in lieu of ibuprofen.  Rx P.T. for modalities, gentle ROM, stretching and strengthening exercises.  Will advise P.T. to progress to SLS and proprioception exercises.  RTC 6-8 weeks.

## 2020-06-19 NOTE — HISTORY OF PRESENT ILLNESS
[FreeTextEntry1] : 24 y.o. F w/ h/o MR presents to office w/ c/o right foot pain s/p fall few weeks ago.  Pt. presented to  center where x-rays obtained.  Pt. was placed in orthopedic shoe.  States she may have sprained her ankle.  Wearing ACE wrap.  Takes OTC ibuprofen 2 tabs/day.  No P.T.

## 2020-07-05 ENCOUNTER — TRANSCRIPTION ENCOUNTER (OUTPATIENT)
Age: 25
End: 2020-07-05

## 2020-07-15 ENCOUNTER — TRANSCRIPTION ENCOUNTER (OUTPATIENT)
Age: 25
End: 2020-07-15

## 2020-07-21 ENCOUNTER — APPOINTMENT (OUTPATIENT)
Dept: OTOLARYNGOLOGY | Facility: CLINIC | Age: 25
End: 2020-07-21
Payer: MEDICAID

## 2020-07-21 VITALS — TEMPERATURE: 97.7 F | HEIGHT: 61 IN | WEIGHT: 228 LBS | BODY MASS INDEX: 43.05 KG/M2

## 2020-07-21 DIAGNOSIS — H61.23 IMPACTED CERUMEN, BILATERAL: ICD-10-CM

## 2020-07-21 DIAGNOSIS — H60.91 UNSPECIFIED OTITIS EXTERNA, RIGHT EAR: ICD-10-CM

## 2020-07-21 PROCEDURE — 69210 REMOVE IMPACTED EAR WAX UNI: CPT

## 2020-07-21 PROCEDURE — 99214 OFFICE O/P EST MOD 30 MIN: CPT | Mod: 25

## 2020-07-21 NOTE — PHYSICAL EXAM
[Midline] : trachea located in midline position [Normal] : no rashes [FreeTextEntry1] : overweight [de-identified] : tender right TMJ  [de-identified] : sl inflammation of  right eac; cerumen in both ears - removed  [de-identified] : after cerumen removed

## 2020-07-21 NOTE — ASSESSMENT
[FreeTextEntry1] : Patient with right otalgia with findings of inflammation of right eac after removal of cerumen.  Recommended stop neomycin drops, and started on acetic acid drops.  Strict dry ear precautions and avoid putting fb (earphones etc) in right ear.  Also has tender right tmj and recommended warm compresses, stop gum chewing and NSAIDS.  May need dental eval if this persists.  Follow up in 3 weeks and as necessary.

## 2020-07-21 NOTE — HISTORY OF PRESENT ILLNESS
[de-identified] : c/o discomfort under right ear.  Seen at Urgent Care early July - removed cerumen and placed on ear drops.  Seems worse after drops.  Here for further eval.  Hx of parotid surgery left side 2018.  No hx of  frequent ear problems.  On neomycin drops.  Patient does chew gum and wears headphones.

## 2020-07-23 ENCOUNTER — APPOINTMENT (OUTPATIENT)
Dept: PULMONOLOGY | Facility: CLINIC | Age: 25
End: 2020-07-23

## 2020-07-23 RX ORDER — NEOMYCIN AND POLYMYXIN B SULFATES AND HYDROCORTISONE OTIC 10; 3.5; 1 MG/ML; MG/ML; [USP'U]/ML
3.5-10000-1 SUSPENSION AURICULAR (OTIC)
Qty: 10 | Refills: 0 | Status: DISCONTINUED | COMMUNITY
Start: 2020-07-05 | End: 2020-07-23

## 2020-08-11 ENCOUNTER — APPOINTMENT (OUTPATIENT)
Dept: OTOLARYNGOLOGY | Facility: CLINIC | Age: 25
End: 2020-08-11

## 2020-08-13 ENCOUNTER — APPOINTMENT (OUTPATIENT)
Dept: NEUROLOGY | Facility: CLINIC | Age: 25
End: 2020-08-13
Payer: MEDICAID

## 2020-08-13 VITALS
HEART RATE: 82 BPM | SYSTOLIC BLOOD PRESSURE: 110 MMHG | HEIGHT: 61 IN | DIASTOLIC BLOOD PRESSURE: 80 MMHG | TEMPERATURE: 97.5 F

## 2020-08-13 PROCEDURE — 99204 OFFICE O/P NEW MOD 45 MIN: CPT

## 2020-08-13 NOTE — PHYSICAL EXAM
[Oriented To Time, Place, And Person] : oriented to person, place, and time [General Appearance - In No Acute Distress] : in no acute distress [General Appearance - Alert] : alert [Affect] : the affect was normal [Impaired Insight] : insight and judgment were intact [Person] : oriented to person [Place] : oriented to place [Time] : oriented to time [Fluency] : fluency intact [Comprehension] : comprehension intact [Cranial Nerves Optic (II)] : visual acuity intact bilaterally,  visual fields full to confrontation, pupils equal round and reactive to light [Past History] : adequate knowledge of personal past history [Cranial Nerves Oculomotor (III)] : extraocular motion intact [Cranial Nerves Trigeminal (V)] : facial sensation intact symmetrically [Cranial Nerves Facial (VII)] : face symmetrical [Cranial Nerves Glossopharyngeal (IX)] : tongue and palate midline [Cranial Nerves Vestibulocochlear (VIII)] : hearing was intact bilaterally [Cranial Nerves Accessory (XI - Cranial And Spinal)] : head turning and shoulder shrug symmetric [Cranial Nerves Hypoglossal (XII)] : there was no tongue deviation with protrusion [Motor Tone] : muscle tone was normal in all four extremities [Motor Strength] : muscle strength was normal in all four extremities [No Muscle Atrophy] : normal bulk in all four extremities [Sensation Tactile Decrease] : light touch was intact [Abnormal Walk] : normal gait [Balance] : balance was intact [2+] : Ankle jerk left 2+ [FreeTextEntry1] : Poor eye contact, mother does most of the talking. [Past-pointing] : there was no past-pointing [Plantar Reflex Right Only] : normal on the right [Tremor] : no tremor present [Plantar Reflex Left Only] : normal on the left

## 2020-08-13 NOTE — ASSESSMENT
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 The importance of stress reduction.\par  Trial with  Emgality 240 mg SQ, patient agrees.Injected 120 mg in each upper arm. Tolerated well, no complications.\par Amerge 2.5 mg po PRN ha, can repeat in 4 hours.\par Advised to maintain a headache diary.\par RTO 1 month

## 2020-08-13 NOTE — HISTORY OF PRESENT ILLNESS
[Chronic Headache] : chronic headache [Nausea] : nausea [Photophobia] : photophobia [Phonophobia] : phonophobia [> 4 hours] : > 4 hours [Daily] : daily [6] : a current pain level of 6/10 [10] : a maximum pain level of 10/10 [improved] : improved [Stable] : The patient reports ~his/her~ symptoms since the last visit are stable [FreeTextEntry1] : 25-year-old woman, history ofbipolar disorder, chronic, daily headaches, subdural papilledema.Comes in with her mother.\par Persistent headache now for many years, diagnosed in the past with pseudotumor cerebri, several years ago, underwent evaluation for possible  shunt.Eventually found to have no need.\par Headaches develop as the day goes on, moderate intensity, can become much more severe, becomes light and sound sensitivity, at times nauseous, rarely vomiting.Holocephalic,pressure, can be throbbing. Unrelated to activity, or menstrual cycle. Takes Tylenol or Advil with some improvement.\par  [Aura] : no aura [Dizziness] : no dizziness [Neck Pain] : no neck pain [Vomiting] : no Vomiting [Scotoma] : no scotoma [Numbness] : no numbness [Tingling] : no tingling [Weakness] : no weakness [Scalp Tenderness] : no scalp tenderness

## 2020-08-14 ENCOUNTER — OUTPATIENT (OUTPATIENT)
Dept: OUTPATIENT SERVICES | Facility: HOSPITAL | Age: 25
LOS: 1 days | End: 2020-08-14
Payer: MEDICAID

## 2020-08-14 ENCOUNTER — APPOINTMENT (OUTPATIENT)
Dept: MRI IMAGING | Facility: CLINIC | Age: 25
End: 2020-08-14
Payer: MEDICAID

## 2020-08-14 DIAGNOSIS — G93.2 BENIGN INTRACRANIAL HYPERTENSION: Chronic | ICD-10-CM

## 2020-08-14 DIAGNOSIS — S99.921A UNSPECIFIED INJURY OF RIGHT FOOT, INITIAL ENCOUNTER: ICD-10-CM

## 2020-08-14 DIAGNOSIS — Z00.8 ENCOUNTER FOR OTHER GENERAL EXAMINATION: ICD-10-CM

## 2020-08-14 DIAGNOSIS — M79.671 PAIN IN RIGHT FOOT: ICD-10-CM

## 2020-08-14 DIAGNOSIS — Z98.890 OTHER SPECIFIED POSTPROCEDURAL STATES: Chronic | ICD-10-CM

## 2020-08-14 PROCEDURE — 73721 MRI JNT OF LWR EXTRE W/O DYE: CPT | Mod: 26,RT

## 2020-08-14 PROCEDURE — 73721 MRI JNT OF LWR EXTRE W/O DYE: CPT

## 2020-08-26 ENCOUNTER — APPOINTMENT (OUTPATIENT)
Dept: PHYSICAL MEDICINE AND REHAB | Facility: CLINIC | Age: 25
End: 2020-08-26
Payer: MEDICAID

## 2020-08-26 VITALS
DIASTOLIC BLOOD PRESSURE: 82 MMHG | HEIGHT: 61 IN | SYSTOLIC BLOOD PRESSURE: 138 MMHG | HEART RATE: 120 BPM | BODY MASS INDEX: 43.05 KG/M2 | WEIGHT: 228 LBS

## 2020-08-26 PROCEDURE — 99213 OFFICE O/P EST LOW 20 MIN: CPT

## 2020-08-26 NOTE — PHYSICAL EXAM
[FreeTextEntry1] : NAD\par A&Ox3\par Obese\par Inspection RIGHT ANKLE: mild swelling around lateral malleolus\par ROM: -10' DF\par Pronation/Supination: neutral\par Tibial rotation: none\par Drawer Test: neg\par Talar Tilt: neg\par Palpation\par Talar dome: NTTP\par Sinus tarsi: NTTP\par Distal Achilles tendon/enthesis: TTP\par 5TH metatarsal: NTTP\par ATFL: TTP\par CFL: TTP\par

## 2020-08-26 NOTE — ASSESSMENT
[FreeTextEntry1] : 25 y.o. F w/ chronic lateral ankle sprain and mild Achilles' paratenosynovitis.  Reinforced need for aggressive P.T. for stretching, ROM and advancing to SLS exercises.  Advised to wean out of ortho shoe.  RTC 6-8 weeks.

## 2020-08-26 NOTE — DISCUSSION/SUMMARY
[FreeTextEntry1] : Spoke w/ mother regarding daughter's MRI results.  No fracture.  Mild Achilles paratenonitis.  Small flexor hallucis longus tendon sheath effusion with small tendon sheath ganglion proximal to the sustentaculum marshall, new from the prior.  \par \par Asked mom to bring daughter into office for f/u as she is still wearing brace and reports swelling.  Will schedule appt soon.

## 2020-08-26 NOTE — HISTORY OF PRESENT ILLNESS
[FreeTextEntry1] : 25 y.o. F returns to office to review MRI R ankle results.  No fracture.  Mild Achilles paratenonitis.  Small flexor hallucis longus tendon sheath effusion with small tendon sheath ganglion proximal to the sustentaculum marshall, new from the prior.  Pt. still reports posterolateral ankle pain w/ intermittent swelling.  Still wearing orthopedic shoe.  Pt. is participating in P.T.

## 2020-09-01 ENCOUNTER — TRANSCRIPTION ENCOUNTER (OUTPATIENT)
Age: 25
End: 2020-09-01

## 2020-09-04 ENCOUNTER — APPOINTMENT (OUTPATIENT)
Dept: PHYSICAL MEDICINE AND REHAB | Facility: CLINIC | Age: 25
End: 2020-09-04
Payer: MEDICAID

## 2020-09-04 VITALS
SYSTOLIC BLOOD PRESSURE: 116 MMHG | TEMPERATURE: 97 F | BODY MASS INDEX: 43.05 KG/M2 | WEIGHT: 228 LBS | HEIGHT: 61 IN | DIASTOLIC BLOOD PRESSURE: 80 MMHG

## 2020-09-04 PROCEDURE — 99212 OFFICE O/P EST SF 10 MIN: CPT

## 2020-09-04 NOTE — PHYSICAL EXAM
[FreeTextEntry1] : NAD\par A&Ox3\par Obese\par No significant change in today's examination\par Inspection RIGHT ANKLE: mild swelling around lateral malleolus\par ROM: -10' DF\par Pronation/Supination: neutral\par Tibial rotation: none\par Drawer Test: neg\par Talar Tilt: neg\par Palpation\par Talar dome: NTTP\par Sinus tarsi: NTTP\par Distal Achilles tendon/enthesis: TTP\par 5TH metatarsal: NTTP\par ATFL: TTP\par CFL: TTP\par MMT: 4/5 R TA (pain limited)\par DTR's: symmetric knees/ankles\par SILT\par PP examination unreliable

## 2020-09-04 NOTE — HISTORY OF PRESENT ILLNESS
[FreeTextEntry1] : 25 y.o. F w/ chronic right lateral ankle sprain and mild Achilles' paratenosynovitis returns to office for f/u.  Pt's P.T. emailed me yesterday concerned about pt's ankle giving out -> 2 falls recently (last fall on Monday).

## 2020-09-04 NOTE — ASSESSMENT
[FreeTextEntry1] : 25 y.o. F w/ chronic right lateral ankle sprain and mild Achilles' paratenosynovitis.  Pt. is cleared to return to P.T. for stretching, ROM and advancing to SLS exercises.  May use OTC Voltaren gel prn.  RTC 6-8 weeks.

## 2020-09-04 NOTE — DATA REVIEWED
[MRI] : MRI [FreeTextEntry1] : MRI results. No fracture. Mild Achilles paratenonitis. Small flexor hallucis longus tendon sheath effusion with small tendon sheath ganglion proximal to the sustentaculum marshall, new from the prior.

## 2020-09-07 ENCOUNTER — RX RENEWAL (OUTPATIENT)
Age: 25
End: 2020-09-07

## 2020-09-11 ENCOUNTER — TRANSCRIPTION ENCOUNTER (OUTPATIENT)
Age: 25
End: 2020-09-11

## 2020-09-17 ENCOUNTER — APPOINTMENT (OUTPATIENT)
Dept: NEUROLOGY | Facility: CLINIC | Age: 25
End: 2020-09-17
Payer: MEDICAID

## 2020-09-17 VITALS
WEIGHT: 228 LBS | SYSTOLIC BLOOD PRESSURE: 120 MMHG | TEMPERATURE: 97.5 F | HEART RATE: 118 BPM | DIASTOLIC BLOOD PRESSURE: 80 MMHG | HEIGHT: 61 IN | BODY MASS INDEX: 43.05 KG/M2

## 2020-09-17 DIAGNOSIS — H53.2 DIPLOPIA: ICD-10-CM

## 2020-09-17 PROCEDURE — 99214 OFFICE O/P EST MOD 30 MIN: CPT

## 2020-09-17 NOTE — REVIEW OF SYSTEMS
[Eye Pain] : no eye pain [Red Eyes] : eyes not red [Eyesight Problems] : eyesight problems [Discharge From Eyes] : no purulent discharge from the eyes [Dry Eyes] : no dryness of the eyes [Eyes Itch] : no itching of the eyes [Earache] : earache [Loss Of Hearing] : no hearing loss [Nosebleeds] : no nosebleeds [Nasal Discharge] : no nasal discharge [Negative] : Heme/Lymph

## 2020-09-17 NOTE — ASSESSMENT
[FreeTextEntry1] : 25-year-old woman history of chronic headaches migraines, mixed headache type. History of questionable pseudotumor cerebri. Reports some improvement with Emgality.\par Plan: Continue Emgality 120 mg subcutaneous monthly.\par Obtain MRI of brain without contrast. Rule out structural lesion.\par Electroencephalograph.\par Return to office, 4-6 weeks.

## 2020-09-17 NOTE — PHYSICAL EXAM
[General Appearance - Alert] : alert [General Appearance - In No Acute Distress] : in no acute distress [Oriented To Time, Place, And Person] : oriented to person, place, and time [Impaired Insight] : insight and judgment were intact [Affect] : the affect was normal [Person] : oriented to person [Place] : oriented to place [Time] : oriented to time [Fluency] : fluency intact [Comprehension] : comprehension intact [Past History] : adequate knowledge of personal past history [Cranial Nerves Optic (II)] : visual acuity intact bilaterally,  visual fields full to confrontation, pupils equal round and reactive to light [Cranial Nerves Oculomotor (III)] : extraocular motion intact [Cranial Nerves Trigeminal (V)] : facial sensation intact symmetrically [Cranial Nerves Facial (VII)] : face symmetrical [Cranial Nerves Vestibulocochlear (VIII)] : hearing was intact bilaterally [Cranial Nerves Glossopharyngeal (IX)] : tongue and palate midline [Cranial Nerves Accessory (XI - Cranial And Spinal)] : head turning and shoulder shrug symmetric [Cranial Nerves Hypoglossal (XII)] : there was no tongue deviation with protrusion [Motor Tone] : muscle tone was normal in all four extremities [Motor Strength] : muscle strength was normal in all four extremities [No Muscle Atrophy] : normal bulk in all four extremities [Motor Handedness Right-Handed] : the patient is right hand dominant [Paresis Pronator Drift Right-Sided] : no pronator drift on the right [Paresis Pronator Drift Left-Sided] : no pronator drift on the left [Sensation Tactile Decrease] : light touch was intact [Abnormal Walk] : normal gait [Balance] : balance was intact [Past-pointing] : there was no past-pointing [Tremor] : no tremor present [2+] : Ankle jerk left 2+ [Plantar Reflex Right Only] : normal on the right [Plantar Reflex Left Only] : normal on the left

## 2020-09-17 NOTE — HISTORY OF PRESENT ILLNESS
[FreeTextEntry1] : 25-year-old woman with history of recurrent headaches, almost daily, moderate to severe intensity, at times associated with light sensitivity, nausea and dizziness. Reports occasional diplopia, horizontal images, sometimes not associated with her headaches.\par Headaches in general tend to worsen with activity, and to be best when she lays down. last visit prescribed Amerge  2.5 mg, which she reports was helpful. We also did a trial with Emgality 240 mg subcutaneous, no reported side effects. Reports some improvement in her headache frequency as well as severity.\par

## 2020-09-23 ENCOUNTER — APPOINTMENT (OUTPATIENT)
Dept: PHYSICAL MEDICINE AND REHAB | Facility: CLINIC | Age: 25
End: 2020-09-23
Payer: MEDICAID

## 2020-09-23 VITALS
HEIGHT: 61 IN | SYSTOLIC BLOOD PRESSURE: 133 MMHG | HEART RATE: 116 BPM | DIASTOLIC BLOOD PRESSURE: 87 MMHG | WEIGHT: 228 LBS | BODY MASS INDEX: 43.05 KG/M2

## 2020-09-23 PROCEDURE — 99213 OFFICE O/P EST LOW 20 MIN: CPT

## 2020-09-23 NOTE — ASSESSMENT
[FreeTextEntry1] : Tayler is a pleasant 25F who presented with her mother for second opinion regarding chronic right ankle pain. Patient is progressing with PT as, as per patient, she has less pain than before. Regarding symptomatic relief other than PT, recommended that patient try Voltaren gel 3-4x/day if needed (and to stop Ibuprofen when taking it), as well as to obtain a general supportive ankle brace to try when walking/biking or doing other activities. Patient is motivated to lose weight which she knows will likely improve her ankle pain. New PT script given today, patient to follow up as needed.

## 2020-09-23 NOTE — PHYSICAL EXAM
[FreeTextEntry1] : PE:\par Constitutional: In NAD, calm and cooperative\par HEENT: NCAT, Anicteric sclera, no lid-lag\par Cardio: Extremities appear pink and well perfused\par Respiratory: Normal respiratory effort on room air, no accessory muscle use\par Skin: no rashes seen on exposed skin, no visible abrasions\par Psych: Normal affect, intact judgment and insight\par Neuro: Awake, alert and oriented x 3, see below for focused neurological exam\par MSK (R ankle)\par 	Inspection: mild lateral malleolus swelling\par 	Palpation: Tenderness on inferior portion of achilles tendon\par 	ROM: 10-15 degrees of Ankle dorsiflexion\par 	Strength: 4/5 strength in ankle dorsiflexion \par 	Reflexes:1+ Achilles reflex bilaterally\par 	Sensation: Intact to light touch grossly in bilateral lower extremities\par 	Special tests: Anterior/Posterior draw tests negative\par

## 2020-09-24 NOTE — ASU PATIENT PROFILE, ADULT - FALL HARM RISK CONCLUSION
Universal Safety Interventions Skin Substitute Injection Text: The defect edges were debeveled with a #15 scalpel blade.  Given the location of the defect, shape of the defect and the proximity to free margins a skin substitute micronized graft was deemed most appropriate.  The entire vial contents were admixed with 3.0ccs of sterile saline and then injected subcutaneously throughout the entire wound bed.

## 2020-10-10 ENCOUNTER — APPOINTMENT (OUTPATIENT)
Dept: MRI IMAGING | Facility: CLINIC | Age: 25
End: 2020-10-10

## 2020-10-13 ENCOUNTER — TRANSCRIPTION ENCOUNTER (OUTPATIENT)
Age: 25
End: 2020-10-13

## 2020-10-14 ENCOUNTER — APPOINTMENT (OUTPATIENT)
Dept: MRI IMAGING | Facility: CLINIC | Age: 25
End: 2020-10-14
Payer: MEDICAID

## 2020-10-14 ENCOUNTER — OUTPATIENT (OUTPATIENT)
Dept: OUTPATIENT SERVICES | Facility: HOSPITAL | Age: 25
LOS: 1 days | End: 2020-10-14
Payer: MEDICAID

## 2020-10-14 DIAGNOSIS — G93.2 BENIGN INTRACRANIAL HYPERTENSION: Chronic | ICD-10-CM

## 2020-10-14 DIAGNOSIS — R51.9 HEADACHE, UNSPECIFIED: ICD-10-CM

## 2020-10-14 DIAGNOSIS — Z98.890 OTHER SPECIFIED POSTPROCEDURAL STATES: Chronic | ICD-10-CM

## 2020-10-14 DIAGNOSIS — Z00.8 ENCOUNTER FOR OTHER GENERAL EXAMINATION: ICD-10-CM

## 2020-10-14 PROCEDURE — 70551 MRI BRAIN STEM W/O DYE: CPT | Mod: 26

## 2020-10-14 PROCEDURE — 70551 MRI BRAIN STEM W/O DYE: CPT

## 2020-10-21 ENCOUNTER — APPOINTMENT (OUTPATIENT)
Dept: PHYSICAL MEDICINE AND REHAB | Facility: CLINIC | Age: 25
End: 2020-10-21

## 2020-11-02 ENCOUNTER — APPOINTMENT (OUTPATIENT)
Dept: NEUROLOGY | Facility: CLINIC | Age: 25
End: 2020-11-02
Payer: MEDICAID

## 2020-11-02 VITALS
BODY MASS INDEX: 43.05 KG/M2 | WEIGHT: 228 LBS | HEART RATE: 100 BPM | HEIGHT: 61 IN | TEMPERATURE: 97.9 F | SYSTOLIC BLOOD PRESSURE: 120 MMHG | DIASTOLIC BLOOD PRESSURE: 80 MMHG

## 2020-11-02 PROCEDURE — 99072 ADDL SUPL MATRL&STAF TM PHE: CPT

## 2020-11-02 PROCEDURE — 99214 OFFICE O/P EST MOD 30 MIN: CPT

## 2020-11-02 RX ORDER — NARATRIPTAN 2.5 MG/1
2.5 TABLET, FILM COATED ORAL
Qty: 12 | Refills: 5 | Status: DISCONTINUED | COMMUNITY
Start: 2020-08-13 | End: 2020-11-02

## 2020-11-02 NOTE — PHYSICAL EXAM
[General Appearance - Alert] : alert [General Appearance - In No Acute Distress] : in no acute distress [Oriented To Time, Place, And Person] : oriented to person, place, and time [Impaired Insight] : insight and judgment were intact [FreeTextEntry1] : flat affect [Person] : oriented to person [Place] : oriented to place [Time] : oriented to time [Fluency] : fluency intact [Comprehension] : comprehension intact [Past History] : adequate knowledge of personal past history [Cranial Nerves Optic (II)] : visual acuity intact bilaterally,  visual fields full to confrontation, pupils equal round and reactive to light [Cranial Nerves Oculomotor (III)] : extraocular motion intact [Cranial Nerves Trigeminal (V)] : facial sensation intact symmetrically [Cranial Nerves Facial (VII)] : face symmetrical [Cranial Nerves Vestibulocochlear (VIII)] : hearing was intact bilaterally [Cranial Nerves Glossopharyngeal (IX)] : tongue and palate midline [Cranial Nerves Accessory (XI - Cranial And Spinal)] : head turning and shoulder shrug symmetric [Cranial Nerves Hypoglossal (XII)] : there was no tongue deviation with protrusion [Motor Tone] : muscle tone was normal in all four extremities [Motor Strength] : muscle strength was normal in all four extremities [No Muscle Atrophy] : normal bulk in all four extremities [Motor Handedness Right-Handed] : the patient is right hand dominant [Paresis Pronator Drift Right-Sided] : no pronator drift on the right [Paresis Pronator Drift Left-Sided] : no pronator drift on the left [Abnormal Walk] : normal gait [Balance] : balance was intact [Past-pointing] : there was no past-pointing [Tremor] : no tremor present

## 2020-11-02 NOTE — HISTORY OF PRESENT ILLNESS
[FreeTextEntry1] : 25-year-old right-handed woman history of chronic migraines, psychiatry disorder, accompanied by her grandfather. Prescribed and using Emgality 120 mg of Q. monthly with excellent response, notes significant reduction in headache frequency. Takes Tylenol or Advil when she does have the headache, she tried naratriptan 2.,5 mg without improvement.\par No problems using Emgality, no noticeable side effects.

## 2020-11-02 NOTE — DATA REVIEWED
[FreeTextEntry1] :  EXAM:  MR BRAIN  \par \par \par PROCEDURE DATE:  10/14/2020  \par  \par \par \par INTERPRETATION:  CLINICAL INFORMATION: Worsening headaches with diplopia; history of pseudotumor cerebri.\par \par TECHNIQUE: MRI of the brain was performed without contrast. Sagittal and axial T1, axial T2, FLAIR, GRE, diffusion-weighted images and an ADC map were obtained.\par \par COMPARISON: MR Head 9/20/2013.\par \par FINDINGS:\par \par There is no intraparenchymal hematoma, mass effect or midline shift. No abnormal extra-axial fluid collections are present.\par \par The sulci and ventricles are normal in size. No hydrocephalus. Basal cisterns are patent.\par \par There is no diffusion abnormality to suggest acute or subacute infarction. 3 punctate foci of FLAIR signal hyperintensity in the LEFT frontal subcortical white matter nonspecific for ischemia, demyelination or gliosis which may also be seen in a patient with a history of migraines underlying disease. Major flow-voids at the base of the brain follow expected course and contour.\par \par The calvarium is intact. The visualized intraorbital compartments are within normal limits.  Right maxillary sinus mucosal polyp and mild left ethmoid sinus mucosal thickening. The  tympanomastoid cavities are clear.\par \par IMPRESSION:\par \par  3 punctate foci of FLAIR signal hyperintensity in the LEFT frontal subcortical white matter nonspecific for ischemia, demyelination or gliosis which may also be seen in a patient with a history of migraines underlying disease.\par \par No evidence of hemorrhage, infarction or cerebral edema.\par

## 2020-11-02 NOTE — ASSESSMENT
[FreeTextEntry1] : 25-year-old woman right-handed history of chronic migraines, responded very well to treatment. she also tried Amerge 2.5 mg, po PRN headache, but was not helpful.\par Discuss options.\par Plan: Continue Emgality 120 mg SQ monthly.\par try Nurtec 75 mg po QD, PRN headache.\par Advised to maintain a headache diary.\par Return to office, 3 months.

## 2020-11-07 ENCOUNTER — APPOINTMENT (OUTPATIENT)
Dept: FAMILY MEDICINE | Facility: CLINIC | Age: 25
End: 2020-11-07
Payer: MEDICAID

## 2020-11-07 VITALS
HEIGHT: 61 IN | OXYGEN SATURATION: 98 % | TEMPERATURE: 97.6 F | HEART RATE: 100 BPM | WEIGHT: 261 LBS | RESPIRATION RATE: 16 BRPM | SYSTOLIC BLOOD PRESSURE: 120 MMHG | DIASTOLIC BLOOD PRESSURE: 82 MMHG | BODY MASS INDEX: 49.28 KG/M2

## 2020-11-07 DIAGNOSIS — Z87.09 PERSONAL HISTORY OF OTHER DISEASES OF THE RESPIRATORY SYSTEM: ICD-10-CM

## 2020-11-07 DIAGNOSIS — M25.562 PAIN IN LEFT KNEE: ICD-10-CM

## 2020-11-07 DIAGNOSIS — Z23 ENCOUNTER FOR IMMUNIZATION: ICD-10-CM

## 2020-11-07 DIAGNOSIS — Z87.898 PERSONAL HISTORY OF OTHER SPECIFIED CONDITIONS: ICD-10-CM

## 2020-11-07 DIAGNOSIS — M54.2 CERVICALGIA: ICD-10-CM

## 2020-11-07 DIAGNOSIS — M25.571 PAIN IN RIGHT ANKLE AND JOINTS OF RIGHT FOOT: ICD-10-CM

## 2020-11-07 DIAGNOSIS — R10.9 UNSPECIFIED ABDOMINAL PAIN: ICD-10-CM

## 2020-11-07 DIAGNOSIS — M79.659 PAIN IN UNSPECIFIED THIGH: ICD-10-CM

## 2020-11-07 DIAGNOSIS — Z20.828 CONTACT WITH AND (SUSPECTED) EXPOSURE TO OTHER VIRAL COMMUNICABLE DISEASES: ICD-10-CM

## 2020-11-07 DIAGNOSIS — H61.23 IMPACTED CERUMEN, BILATERAL: ICD-10-CM

## 2020-11-07 PROCEDURE — G0008: CPT

## 2020-11-07 PROCEDURE — 36415 COLL VENOUS BLD VENIPUNCTURE: CPT

## 2020-11-07 PROCEDURE — 99072 ADDL SUPL MATRL&STAF TM PHE: CPT

## 2020-11-07 PROCEDURE — 90686 IIV4 VACC NO PRSV 0.5 ML IM: CPT

## 2020-11-07 PROCEDURE — 99395 PREV VISIT EST AGE 18-39: CPT | Mod: 25

## 2020-11-07 RX ORDER — SERTRALINE HYDROCHLORIDE 100 MG/1
100 TABLET, FILM COATED ORAL
Refills: 0 | Status: DISCONTINUED | COMMUNITY
End: 2020-11-07

## 2020-11-07 RX ORDER — BUSPIRONE HYDROCHLORIDE 5 MG/1
5 TABLET ORAL
Qty: 60 | Refills: 0 | Status: DISCONTINUED | COMMUNITY
Start: 2020-03-20 | End: 2020-11-07

## 2020-11-07 RX ORDER — ZIPRASIDONE 60 MG/1
60 CAPSULE ORAL DAILY
Refills: 0 | Status: DISCONTINUED | COMMUNITY
End: 2020-11-07

## 2020-11-07 RX ORDER — ALPRAZOLAM 0.25 MG/1
0.25 TABLET ORAL 3 TIMES DAILY
Refills: 0 | Status: DISCONTINUED | COMMUNITY
End: 2020-11-07

## 2020-11-07 RX ORDER — BUSPIRONE HYDROCHLORIDE 15 MG/1
15 TABLET ORAL
Qty: 60 | Refills: 0 | Status: DISCONTINUED | COMMUNITY
Start: 2020-07-12 | End: 2020-11-07

## 2020-11-07 RX ORDER — BUSPIRONE HYDROCHLORIDE 15 MG/1
15 TABLET ORAL TWICE DAILY
Qty: 60 | Refills: 0 | Status: ACTIVE | COMMUNITY
Start: 2020-11-07

## 2020-11-09 PROBLEM — R10.9 ABDOMINAL PAIN, ACUTE: Status: RESOLVED | Noted: 2018-05-19 | Resolved: 2020-11-09

## 2020-11-09 PROBLEM — Z87.09 HISTORY OF PHARYNGITIS: Status: RESOLVED | Noted: 2020-04-09 | Resolved: 2020-11-09

## 2020-11-09 PROBLEM — M79.659 ACUTE THIGH PAIN: Status: RESOLVED | Noted: 2019-11-01 | Resolved: 2020-11-09

## 2020-11-09 PROBLEM — M25.571 ACUTE RIGHT ANKLE PAIN: Status: RESOLVED | Noted: 2017-07-24 | Resolved: 2020-11-09

## 2020-11-09 PROBLEM — H61.23 BILATERAL IMPACTED CERUMEN: Status: RESOLVED | Noted: 2018-04-24 | Resolved: 2020-11-09

## 2020-11-09 PROBLEM — M25.562 LEFT KNEE PAIN: Status: RESOLVED | Noted: 2019-02-05 | Resolved: 2020-11-09

## 2020-11-09 PROBLEM — Z87.898 HISTORY OF CHEST PAIN: Status: RESOLVED | Noted: 2019-08-28 | Resolved: 2020-11-09

## 2020-11-09 PROBLEM — M54.2 CERVICAL MUSCLE PAIN: Status: RESOLVED | Noted: 2019-04-17 | Resolved: 2020-11-09

## 2020-11-09 PROBLEM — Z23 NEED FOR TETANUS BOOSTER: Status: RESOLVED | Noted: 2019-02-25 | Resolved: 2020-11-09

## 2020-11-10 LAB
25(OH)D3 SERPL-MCNC: 21.6 NG/ML
ALBUMIN SERPL ELPH-MCNC: 4.4 G/DL
ALP BLD-CCNC: 93 U/L
ALT SERPL-CCNC: 15 U/L
ANION GAP SERPL CALC-SCNC: 12 MMOL/L
AST SERPL-CCNC: 13 U/L
BASOPHILS # BLD AUTO: 0.05 K/UL
BASOPHILS NFR BLD AUTO: 0.5 %
BILIRUB SERPL-MCNC: 0.2 MG/DL
BUN SERPL-MCNC: 9 MG/DL
CALCIUM SERPL-MCNC: 9.2 MG/DL
CHLORIDE SERPL-SCNC: 103 MMOL/L
CHOLEST SERPL-MCNC: 150 MG/DL
CO2 SERPL-SCNC: 22 MMOL/L
CREAT SERPL-MCNC: 0.78 MG/DL
EOSINOPHIL # BLD AUTO: 0.12 K/UL
EOSINOPHIL NFR BLD AUTO: 1.3 %
ESTIMATED AVERAGE GLUCOSE: 117 MG/DL
FERRITIN SERPL-MCNC: 35 NG/ML
FOLATE SERPL-MCNC: 11.1 NG/ML
GLUCOSE SERPL-MCNC: 91 MG/DL
HBA1C MFR BLD HPLC: 5.7 %
HBV SURFACE AB SERPL IA-ACNC: 13.1 MIU/ML
HCT VFR BLD CALC: 42.1 %
HDLC SERPL-MCNC: 45 MG/DL
HGB BLD-MCNC: 13 G/DL
IMM GRANULOCYTES NFR BLD AUTO: 0.3 %
IRON SATN MFR SERPL: 11 %
IRON SERPL-MCNC: 35 UG/DL
LDLC SERPL CALC-MCNC: 87 MG/DL
LYMPHOCYTES # BLD AUTO: 2.21 K/UL
LYMPHOCYTES NFR BLD AUTO: 23.7 %
M TB IFN-G BLD-IMP: NEGATIVE
MAN DIFF?: NORMAL
MCHC RBC-ENTMCNC: 25.8 PG
MCHC RBC-ENTMCNC: 30.9 GM/DL
MCV RBC AUTO: 83.7 FL
MONOCYTES # BLD AUTO: 0.4 K/UL
MONOCYTES NFR BLD AUTO: 4.3 %
NEUTROPHILS # BLD AUTO: 6.51 K/UL
NEUTROPHILS NFR BLD AUTO: 69.9 %
NONHDLC SERPL-MCNC: 105 MG/DL
PLATELET # BLD AUTO: 353 K/UL
POTASSIUM SERPL-SCNC: 4.4 MMOL/L
PROT SERPL-MCNC: 7.1 G/DL
QUANTIFERON TB PLUS MITOGEN MINUS NIL: 8.27 IU/ML
QUANTIFERON TB PLUS NIL: 0.03 IU/ML
QUANTIFERON TB PLUS TB1 MINUS NIL: -0.01 IU/ML
QUANTIFERON TB PLUS TB2 MINUS NIL: -0.01 IU/ML
RBC # BLD: 5.03 M/UL
RBC # FLD: 15.2 %
SARS-COV-2 IGG SERPL IA-ACNC: 215 INDEX
SARS-COV-2 IGG SERPL QL IA: POSITIVE
SODIUM SERPL-SCNC: 137 MMOL/L
T4 FREE SERPL-MCNC: 1 NG/DL
TIBC SERPL-MCNC: 326 UG/DL
TRIGL SERPL-MCNC: 90 MG/DL
TSH SERPL-ACNC: 1.17 UIU/ML
UIBC SERPL-MCNC: 291 UG/DL
VIT B12 SERPL-MCNC: 649 PG/ML
WBC # FLD AUTO: 9.32 K/UL

## 2020-11-10 NOTE — ASSESSMENT
[FreeTextEntry1] : Health care maintenance:\par check blood work, blood drawn in office\par follow up with optometry/ophthalmology and dentist for routine exams when due\par advised to follow up with GYN \par Flu vaccine given, patient tolerated well \par \par Bipolar depression, Anxiety:\par c/w management as per psychiatry  \par \par Morbid obesity:\par improve upon diet and exercise\par will refer patient to healthy living program\par \par will look to complete forms for patient

## 2020-11-10 NOTE — HEALTH RISK ASSESSMENT
[No] : In the past 12 months have you used drugs other than those required for medical reasons? No [] : No [de-identified] : currently in physical therapy due to right ankle sprain, exercise will need improvement  [de-identified] : Diet needs improvement

## 2020-11-10 NOTE — REVIEW OF SYSTEMS
[Negative] : Heme/Lymph [Anxiety] : anxiety [FreeTextEntry9] : right ankle pain [de-identified] : chronic migraines  [de-identified] : Bipolar depression

## 2020-11-10 NOTE — HISTORY OF PRESENT ILLNESS
[Parent] : parent [FreeTextEntry1] : Annual physical  [de-identified] : \par 25 year old female presents with her mother for annual physical \par \par Has Bipolar depression, anxiety- follows with psychiatry \par \par Needs forms completed to allow her to receive services\par Will need TB screening \par \par agrees for a flu vaccine\par no vaccination reactions\par no fever\par \par follows with neurology for migraines

## 2020-11-10 NOTE — PHYSICAL EXAM
[No Acute Distress] : no acute distress [Well Nourished] : well nourished [Well Developed] : well developed [Well-Appearing] : well-appearing [Normal Sclera/Conjunctiva] : normal sclera/conjunctiva [PERRL] : pupils equal round and reactive to light [Normal Outer Ear/Nose] : the outer ears and nose were normal in appearance [Normal Appearance] : was normal in appearance [Neck Supple] : was supple [Normal] : the thyroid was normal [No Respiratory Distress] : no respiratory distress  [Clear to Auscultation] : lungs were clear to auscultation bilaterally [Normal Rate] : normal rate  [Regular Rhythm] : with a regular rhythm [Normal S1, S2] : normal S1 and S2 [No Murmur] : no murmur heard [No Carotid Bruits] : no carotid bruits [No Edema] : there was no peripheral edema [Soft] : abdomen soft [Non Tender] : non-tender [Normal Bowel Sounds] : normal bowel sounds [Normal Posterior Cervical Nodes] : no posterior cervical lymphadenopathy [Normal Anterior Cervical Nodes] : no anterior cervical lymphadenopathy [No Spinal Tenderness] : no spinal tenderness [Grossly Normal Strength/Tone] : grossly normal strength/tone [No Rash] : no rash [No Focal Deficits] : no focal deficits [Alert and Oriented x3] : oriented to person, place, and time [Appropriate] : appropriate [de-identified] : Obese [de-identified] : bilateral cerumen [de-identified] : multiple nevi

## 2020-11-18 ENCOUNTER — APPOINTMENT (OUTPATIENT)
Dept: PHYSICAL MEDICINE AND REHAB | Facility: CLINIC | Age: 25
End: 2020-11-18
Payer: MEDICAID

## 2020-11-18 VITALS — TEMPERATURE: 98.1 F

## 2020-11-18 VITALS
BODY MASS INDEX: 49.28 KG/M2 | TEMPERATURE: 98.1 F | WEIGHT: 261 LBS | DIASTOLIC BLOOD PRESSURE: 87 MMHG | RESPIRATION RATE: 16 BRPM | SYSTOLIC BLOOD PRESSURE: 131 MMHG | HEART RATE: 100 BPM | HEIGHT: 61 IN | OXYGEN SATURATION: 99 %

## 2020-11-18 PROCEDURE — 99213 OFFICE O/P EST LOW 20 MIN: CPT

## 2020-11-18 NOTE — PHYSICAL EXAM
[FreeTextEntry1] : PE:Entire PE done with MA Tesha Roldananthony in room\par Constitutional: In NAD, calm and cooperative\par HEENT: NCAT, Anicteric sclera, no lid-lag\par Cardio: Extremities appear pink and well perfused\par Respiratory: Normal respiratory effort on room air, no accessory muscle use\par Skin: no rashes seen on exposed skin, no visible abrasions\par Psych: Normal affect, intact judgment and insight\par Neuro: Awake, alert and oriented x 3, see below for focused neurological exam\par MSK (Ankles/Foot)\par 	Inspection: mild lateral malleolus swelling on L, no swelling on R\par 	Palpation: Tenderness on inferior portion of achilles tendon bilaterally and over lateral ankle ligaments bilaterally\par 	ROM: 10-15 degrees of active Ankle dorsiflexion bilaterally\par 	Strength: 4/5 strength in ankle dorsiflexion bilaterally\par 	Reflexes:1+ Achilles reflex bilaterally\par 	Sensation: Intact to light touch grossly in bilateral lower extremities\par 	Special tests: Anterior/Posterior draw tests negative bilaterally\par Patient able to bear weight on both ankles. \par

## 2020-11-18 NOTE — ASSESSMENT
[FreeTextEntry1] : Ms. JUNO SEGOVIA is a 25 year old  female who presents with chronic R ankle pain likely due to chronic Achilles tendonitis, now presenting with left ankle pain very similar to her right ankle pain, possibly due to overexertion. At this time, I suggested to patient that she see a foot and ankle specialist, for which I placed a referral. She can continue PT in the meantime and take up to 3000mg of Tylenol a day for pain relief. Patient agreed and understood this plan. She will follow up with me on an as needed basis.

## 2020-11-18 NOTE — HISTORY OF PRESENT ILLNESS
[FreeTextEntry1] : Ms. JUNO SEGOVIA is a 25 year old  female who presents for follow up. Last visit, it was suggested to use Voltaren gel, continue PT and obtain a general supportive ankle brace. Patient obtained a brace which has helped but not as much as she would like. Voltaren did not help, she takes 2 tylenols at night with okay relief. Now also complaining of similair pain in left ankle, starting a few weeks ago, no recent fall or injury. \par \par Location:Inferior portion of achilles tendon insertion, as well as dorsal first metatarsal bilaterally\par Onset: Months ago after original fall, L ankle pain a few weeks ago\par Provocation/Palliative:Worse with weightbearing and activities, better with rest\par Quality:Ache\par Radiation:no pain up leg\par Severity:mild to moderate\par Timing:no improvement in pain recently. \par \par No bowel/bladder changes. No groin numbness.

## 2020-11-25 ENCOUNTER — NON-APPOINTMENT (OUTPATIENT)
Age: 25
End: 2020-11-25

## 2020-12-08 ENCOUNTER — APPOINTMENT (OUTPATIENT)
Dept: FAMILY MEDICINE | Facility: CLINIC | Age: 25
End: 2020-12-08
Payer: MEDICAID

## 2020-12-08 VITALS
HEIGHT: 61 IN | OXYGEN SATURATION: 98 % | TEMPERATURE: 97.7 F | SYSTOLIC BLOOD PRESSURE: 110 MMHG | HEART RATE: 105 BPM | WEIGHT: 261 LBS | BODY MASS INDEX: 49.28 KG/M2 | DIASTOLIC BLOOD PRESSURE: 78 MMHG

## 2020-12-08 DIAGNOSIS — H69.83 OTHER SPECIFIED DISORDERS OF EUSTACHIAN TUBE, BILATERAL: ICD-10-CM

## 2020-12-08 DIAGNOSIS — R79.89 OTHER SPECIFIED ABNORMAL FINDINGS OF BLOOD CHEMISTRY: Chronic | ICD-10-CM

## 2020-12-08 PROCEDURE — 69210 REMOVE IMPACTED EAR WAX UNI: CPT

## 2020-12-08 PROCEDURE — 99213 OFFICE O/P EST LOW 20 MIN: CPT | Mod: 25

## 2020-12-08 PROCEDURE — 99072 ADDL SUPL MATRL&STAF TM PHE: CPT

## 2020-12-09 PROBLEM — R79.89 PROLACTIN INCREASED: Chronic | Status: ACTIVE | Noted: 2019-02-25

## 2020-12-09 NOTE — HISTORY OF PRESENT ILLNESS
[FreeTextEntry8] : Pt c/o b/l ear pain and feeling clogged for sevearl weeks. Pt reports she feels the pain worsens at night and L>R.  Denies ear discharge, sinus pain/pressure, fever, chills.

## 2020-12-09 NOTE — ASSESSMENT
[FreeTextEntry1] : Left cerumen disimpaction performed using cerumen spoon and water and hydrogen peroxide lavage with patient's informed consent after discussion of possible risks and benefits. Pt tolerated well. Tympanic membrane intact after procedure.\par ETD - medrol twyla. cont antihistamines and flonase. if no improvement f/u with ENT\par \par pt has hx of elevated prolactin, resolved last recheck\par schizoaffective d/o, bipolar d/o - stable, f/u with psych\par

## 2020-12-09 NOTE — PHYSICAL EXAM
[Normal] : normal sclera/conjunctiva, pupils are equal, round and reactive to light and extraocular movements are intact [de-identified] : R TM wnl, L cerumen impaction

## 2020-12-30 ENCOUNTER — TRANSCRIPTION ENCOUNTER (OUTPATIENT)
Age: 25
End: 2020-12-30

## 2021-01-04 ENCOUNTER — APPOINTMENT (OUTPATIENT)
Dept: NEUROLOGY | Facility: CLINIC | Age: 26
End: 2021-01-04
Payer: MEDICAID

## 2021-01-04 PROCEDURE — 99213 OFFICE O/P EST LOW 20 MIN: CPT | Mod: 95

## 2021-01-04 NOTE — HISTORY OF PRESENT ILLNESS
[FreeTextEntry1] : 25-year-old woman history of migraine headaches, for followup visit. she was prescribed Emgality 120 mg monthly injections subcutaneously, tolerated injections well, no report of side effects. No rashes. \par Headaches overall improve,but reports difficulty getting the medication from her pharmacy and the last time she injected herself was in November. her headaches have been become more frequent.They're manageable, she takes Tylenol which helps her. \par \par \par \par \par \par \par \par \par \par \par \par \par

## 2021-01-04 NOTE — REASON FOR VISIT
[Home] : at home, [unfilled] , at the time of the visit. [Medical Office: ([facility])___] : at the medical office located in  [Verbal consent obtained from patient] : the patient, [unfilled]

## 2021-01-04 NOTE — ASSESSMENT
[FreeTextEntry1] : 25-year-old woman history of chronic migraines, was doing better on Emgality, but was unable to obtain it last month. Has noted an increased frequency of headaches since then..\par Plan: Continue Emgality 120 mg SQ monthly.advised patient to contact the office if any difficulty obtaining medications from the pharmacy, can provide her some samples if necessary.\par RTO 2 months

## 2021-02-03 ENCOUNTER — APPOINTMENT (OUTPATIENT)
Dept: PULMONOLOGY | Facility: CLINIC | Age: 26
End: 2021-02-03
Payer: MEDICAID

## 2021-02-03 PROCEDURE — 99214 OFFICE O/P EST MOD 30 MIN: CPT | Mod: 95

## 2021-02-03 NOTE — DISCUSSION/SUMMARY
[FreeTextEntry1] : \par #1. Pt with mild asthma for which she is maintained on Asmanex which I renewed\par #2. ProAir as needed was started\par #3. Pt with at least mild CECILIA with an AHI of 6.2 but may be an underestimation but would also likely improve with weight loss\par #4. Encouraged autoCPAP therapy\par #5. Diet and exercise for weight loss is imperative\par #6. F/u in 2 months

## 2021-02-03 NOTE — REVIEW OF SYSTEMS
[SOB on Exertion] : sob on exertion [Fever] : no fever [Chills] : no chills [Dry Eyes] : no dry eyes [Epistaxis] : no epistaxis [Eye Irritation] : no eye irritation [Nasal Congestion] : no nasal congestion [Postnasal Drip] : no postnasal drip [Sinus Problems] : no sinus problems [Cough] : no cough [Chest Tightness] : no chest tightness [Sputum] : no sputum [Dyspnea] : no dyspnea [Pleuritic Pain] : no pleuritic pain [Wheezing] : no wheezing [Chest Discomfort] : no chest discomfort [Edema] : no edema [Palpitations] : no palpitations [Syncope] : no syncope [Hay Fever] : no hay fever [Itchy Eyes] : no itchy eyes [Seasonal Allergies] : no seasonal allergies [GERD] : no gerd [Abdominal Pain] : no abdominal pain [Nausea] : no nausea [Vomiting] : no vomiting [Diarrhea] : no diarrhea [Constipation] : no constipation [Dysuria] : no dysuria [Back Pain] : no back pain [Anemia] : no anemia [Headache] : no headache [Seizures] : no seizures [Dizziness] : no dizziness [Numbness] : no numbness [Paralysis] : no paralysis [Confusion] : no confusion [Diabetes] : no diabetes [Thyroid Problem] : no thyroid problem [TextBox_137] : Special needs

## 2021-02-03 NOTE — CONSULT LETTER
[Dear  ___] : Dear  [unfilled], [Consult Letter:] : I had the pleasure of evaluating your patient, [unfilled]. [Please see my note below.] : Please see my note below. [Consult Closing:] : Thank you very much for allowing me to participate in the care of this patient.  If you have any questions, please do not hesitate to contact me. [Sincerely,] : Sincerely, [FreeTextEntry3] : Gage Avila MD, FCCP, D. ABSM\par Pulmonary and Sleep Medicine\par [facility] at [place]

## 2021-02-03 NOTE — HISTORY OF PRESENT ILLNESS
[Home] : at home, [unfilled] , at the time of the visit. [Medical Office: ([facility])___] : at the medical office located in  [Patient] : the patient [Self] : self [Excessive Daytime Sleepiness] : excessive daytime sleepiness [Witnessed Apnea During Sleep] : witnessed apnea during sleep [Witnessed Gasping During Sleep] : witnessed gasping during sleep [Snoring] : snoring [Unrefreshing Sleep] : unrefreshing sleep [Sleepy When Sedentary] : sleepy when sedentary [Poor Compliance] : poor compliance with treatment [Poor Tolerance] : poor tolerance of treatment [Excess Weight] : excess weight [Dyspnea] : dyspnea [Low Calorie Diet] : low calorie diet [Fair Compliance] : fair compliance with treatment [Fair Tolerance] : fair tolerance of treatment [Poor Symptom Control] : poor symptom control [Sleep Apnea] : sleep apnea [High] : high [Low Calorie] : low calorie [Well Balanced Diet] : well balanced meals [Infrequently] : exercises infrequently [Aerobic Conditioning] : aerobic conditioning [Walking] : walking [Follow-Up - Routine Clinic] : a routine clinic follow-up of [Mild Persistent] : mild persistent [Dyspnea on Exertion] : dyspnea on exertion [Currently Experiencing] : The patient is currently experiencing symptoms. [None] : No associated symptoms are reported [Inhaled Corticosteroids] : inhaled corticosteroids [Good Compliance] : good compliance with treatment [Good Tolerance] : good tolerance of treatment [Fair Symptom Control] : fair symptom control [de-identified] : autoCPAP

## 2021-02-10 ENCOUNTER — APPOINTMENT (OUTPATIENT)
Dept: PULMONOLOGY | Facility: CLINIC | Age: 26
End: 2021-02-10
Payer: MEDICAID

## 2021-02-10 VITALS
RESPIRATION RATE: 16 BRPM | DIASTOLIC BLOOD PRESSURE: 80 MMHG | HEART RATE: 123 BPM | OXYGEN SATURATION: 98 % | SYSTOLIC BLOOD PRESSURE: 115 MMHG

## 2021-02-10 DIAGNOSIS — R06.83 SNORING: ICD-10-CM

## 2021-02-10 PROCEDURE — 99072 ADDL SUPL MATRL&STAF TM PHE: CPT

## 2021-02-10 PROCEDURE — 99214 OFFICE O/P EST MOD 30 MIN: CPT

## 2021-02-10 RX ORDER — LURASIDONE HYDROCHLORIDE 40 MG/1
40 TABLET, FILM COATED ORAL DAILY
Refills: 0 | Status: COMPLETED | COMMUNITY
Start: 2020-11-07 | End: 2021-02-10

## 2021-02-10 RX ORDER — SERTRALINE HYDROCHLORIDE 100 MG/1
100 TABLET, FILM COATED ORAL DAILY
Refills: 0 | Status: COMPLETED | COMMUNITY
Start: 2020-11-07 | End: 2021-02-10

## 2021-02-10 NOTE — CONSULT LETTER
[Dear  ___] : Dear  [unfilled], [Consult Letter:] : I had the pleasure of evaluating your patient, [unfilled]. [Please see my note below.] : Please see my note below. [Consult Closing:] : Thank you very much for allowing me to participate in the care of this patient.  If you have any questions, please do not hesitate to contact me. [Sincerely,] : Sincerely, [FreeTextEntry3] : Gage Avila MD, FCCP, D. ABSM\par Pulmonary and Sleep Medicine\par [facility] at [place]

## 2021-02-10 NOTE — PHYSICAL EXAM
[No Acute Distress] : no acute distress [Well Nourished] : well nourished [Well Developed] : well developed [Normal Appearance] : normal appearance [Supple] : supple [Normal Rate/Rhythm] : normal rate/rhythm [Normal S1, S2] : normal s1, s2 [No Murmurs] : no murmurs [No Resp Distress] : no resp distress [No Acc Muscle Use] : no acc muscle use [Normal Rhythm and Effort] : normal rhythm and effort [Clear to Auscultation Bilaterally] : clear to auscultation bilaterally [No Abnormalities] : no abnormalities [Benign] : benign [Not Tender] : not tender [Soft] : soft [Normal Gait] : normal gait [No Clubbing] : no clubbing [No Edema] : no edema [No Focal Deficits] : no focal deficits [Oriented x3] : oriented x3 [TextBox_2] : Pt is obese

## 2021-02-10 NOTE — DISCUSSION/SUMMARY
[FreeTextEntry1] : \par #1. Pt with mild asthma for which she is maintained on Asmanex but is now c/o increased SOB so will change to Breo 100 though the increase in SOB may be due to her 50 lbs weight gain.\par #2. ProAir as needed \par #3. Pt with at least mild CECILIA with an AHI of 6.2 but may be an underestimation but would also likely improve with weight loss\par #4. Encouraged autoCPAP therapy; await replacement supplies\par #5. Diet and exercise for weight loss is imperative\par #6. F/u in 2 months for re-evaluation with Dr. Dale\par #7. Reviewed risks of exposure and symptoms of Covid-19 virus, including how the virus is spread and precautions to avoid annmarie virus.\par \par Patient's questions were answered and patient appears to understand these recommendations

## 2021-02-10 NOTE — HISTORY OF PRESENT ILLNESS
[Excessive Daytime Sleepiness] : excessive daytime sleepiness [Witnessed Apnea During Sleep] : witnessed apnea during sleep [Witnessed Gasping During Sleep] : witnessed gasping during sleep [Snoring] : snoring [Unrefreshing Sleep] : unrefreshing sleep [Sleepy When Sedentary] : sleepy when sedentary [Poor Compliance] : poor compliance with treatment [Poor Tolerance] : poor tolerance of treatment [Excess Weight] : excess weight [Dyspnea] : dyspnea [Low Calorie Diet] : low calorie diet [Fair Compliance] : fair compliance with treatment [Fair Tolerance] : fair tolerance of treatment [Poor Symptom Control] : poor symptom control [Sleep Apnea] : sleep apnea [High] : high [Low Calorie] : low calorie [Well Balanced Diet] : well balanced meals [Infrequently] : exercises infrequently [Aerobic Conditioning] : aerobic conditioning [Walking] : walking [Follow-Up - Routine Clinic] : a routine clinic follow-up of [Mild Persistent] : mild persistent [Dyspnea on Exertion] : dyspnea on exertion [Currently Experiencing] : The patient is currently experiencing symptoms. [None] : No associated symptoms are reported [Inhaled Corticosteroids] : inhaled corticosteroids [Good Compliance] : good compliance with treatment [Good Tolerance] : good tolerance of treatment [Fair Symptom Control] : fair symptom control [de-identified] : autoCPAP

## 2021-02-22 ENCOUNTER — APPOINTMENT (OUTPATIENT)
Dept: ORTHOPEDIC SURGERY | Facility: CLINIC | Age: 26
End: 2021-02-22

## 2021-03-17 ENCOUNTER — APPOINTMENT (OUTPATIENT)
Dept: PULMONOLOGY | Facility: CLINIC | Age: 26
End: 2021-03-17
Payer: MEDICAID

## 2021-03-17 VITALS
DIASTOLIC BLOOD PRESSURE: 78 MMHG | HEART RATE: 63 BPM | OXYGEN SATURATION: 97 % | TEMPERATURE: 97.8 F | SYSTOLIC BLOOD PRESSURE: 134 MMHG | BODY MASS INDEX: 51.35 KG/M2 | HEIGHT: 61 IN | WEIGHT: 272 LBS

## 2021-03-17 PROCEDURE — 99072 ADDL SUPL MATRL&STAF TM PHE: CPT

## 2021-03-17 PROCEDURE — 99214 OFFICE O/P EST MOD 30 MIN: CPT

## 2021-04-21 ENCOUNTER — TRANSCRIPTION ENCOUNTER (OUTPATIENT)
Age: 26
End: 2021-04-21

## 2021-05-04 ENCOUNTER — APPOINTMENT (OUTPATIENT)
Dept: NEUROLOGY | Facility: CLINIC | Age: 26
End: 2021-05-04

## 2021-05-18 ENCOUNTER — RX RENEWAL (OUTPATIENT)
Age: 26
End: 2021-05-18

## 2021-05-19 ENCOUNTER — NON-APPOINTMENT (OUTPATIENT)
Age: 26
End: 2021-05-19

## 2021-05-21 ENCOUNTER — NON-APPOINTMENT (OUTPATIENT)
Age: 26
End: 2021-05-21

## 2021-05-25 ENCOUNTER — APPOINTMENT (OUTPATIENT)
Dept: NEUROLOGY | Facility: CLINIC | Age: 26
End: 2021-05-25
Payer: MEDICAID

## 2021-05-25 VITALS
TEMPERATURE: 99 F | SYSTOLIC BLOOD PRESSURE: 130 MMHG | WEIGHT: 270 LBS | HEIGHT: 61 IN | BODY MASS INDEX: 50.98 KG/M2 | DIASTOLIC BLOOD PRESSURE: 70 MMHG

## 2021-05-25 PROCEDURE — 99214 OFFICE O/P EST MOD 30 MIN: CPT

## 2021-05-25 RX ORDER — GALCANEZUMAB 120 MG/ML
120 INJECTION, SOLUTION SUBCUTANEOUS
Qty: 1 | Refills: 11 | Status: DISCONTINUED | COMMUNITY
Start: 2020-09-17 | End: 2021-05-25

## 2021-05-25 NOTE — ASSESSMENT
[FreeTextEntry1] : This is a 25-year-old woman with reported history of migraine headache. They have significant tension component to them as well. She is not done well with the trip hands or anti-CGRP-type drugs. She states she's never been on topiramate in the past I will try this. It may have the added benefit of weight loss. Risks benefits and side effects were discussed with her. I also considered amitriptyline but given her psychiatric history in the medicines that she is taking are ready I would not at this time want at another antidepressant to her treatment regimen. I will see her back in 2 months and have asked her to keep a headache diary to bring with her for review to see if the Topamax is effective.

## 2021-05-25 NOTE — PHYSICAL EXAM
[General Appearance - Alert] : alert [General Appearance - In No Acute Distress] : in no acute distress [Person] : oriented to person [Place] : oriented to place [Time] : oriented to time [Remote Intact] : remote memory intact [Registration Intact] : recent registration memory intact [Span Intact] : the attention span was normal [Concentration Intact] : normal concentrating ability [Visual Intact] : visual attention was ~T not ~L decreased [Naming Objects] : no difficulty naming common objects [Repeating Phrases] : no difficulty repeating a phrase [Fluency] : fluency intact [Comprehension] : comprehension intact [Current Events] : adequate knowledge of current events [Past History] : adequate knowledge of personal past history [Cranial Nerves Optic (II)] : visual acuity intact bilaterally,  visual fields full to confrontation, pupils equal round and reactive to light [Cranial Nerves Oculomotor (III)] : extraocular motion intact [Cranial Nerves Trigeminal (V)] : facial sensation intact symmetrically [Cranial Nerves Facial (VII)] : face symmetrical [Cranial Nerves Vestibulocochlear (VIII)] : hearing was intact bilaterally [Cranial Nerves Glossopharyngeal (IX)] : tongue and palate midline [Cranial Nerves Accessory (XI - Cranial And Spinal)] : head turning and shoulder shrug symmetric [Cranial Nerves Hypoglossal (XII)] : there was no tongue deviation with protrusion [Motor Tone] : muscle tone was normal in all four extremities [Motor Strength] : muscle strength was normal in all four extremities [Involuntary Movements] : no involuntary movements were seen [No Muscle Atrophy] : normal bulk in all four extremities [Paresis Pronator Drift Right-Sided] : no pronator drift on the right [Paresis Pronator Drift Left-Sided] : no pronator drift on the left [Motor Strength Upper Extremities Bilaterally] : strength was normal in both upper extremities [Motor Strength Lower Extremities Bilaterally] : strength was normal in both lower extremities [Sensation Tactile Decrease] : light touch was intact [Sensation Pain / Temperature Decrease] : pain and temperature was intact [Sensation Vibration Decrease] : vibration was intact [Proprioception] : proprioception was intact [Abnormal Walk] : normal gait [Balance] : balance was intact [Tremor] : no tremor present [Coordination - Dysmetria Impaired Finger-to-Nose Bilateral] : not present [2+] : Patella left 2+ [Sclera] : the sclera and conjunctiva were normal [PERRL With Normal Accommodation] : pupils were equal in size, round, reactive to light, with normal accommodation [Extraocular Movements] : extraocular movements were intact [Optic Disc Abnormality] : the optic disc were normal in size and color [No APD] : no afferent pupillary defect [No ROSHAN] : no internuclear ophthalmoplegia [Full Visual Field] : full visual field [Papilledema Of Both Eyes] : no papilledema [Disc Blurred Margins Both Eyes] : sharp margins

## 2021-05-25 NOTE — HISTORY OF PRESENT ILLNESS
[FreeTextEntry1] : Initial office visit May 25, 2021:\par This is a 25-year-old woman who presents today for neurologic evaluation of headache. She's had chronic headaches which have been labeled migraines. They used to be retro-orbital but now they're bifrontal in the wrap around her head. She does get some high pain with it however she does not endorse photophobia or nausea or vomiting as being associated headaches. She had been given Amerge without any benefit per the chart. She's currently taking Emgality injections without a change in her headache. She is here today to transfer her neurologic care for her migraine headaches.

## 2021-05-25 NOTE — DATA REVIEWED
[de-identified] : She had an MRI of her brain done October 14, 2020. There was no acute stroke mass or bleed. There is a few scattered hyperintensities on flair imaging consistent with migraine

## 2021-05-25 NOTE — CONSULT LETTER
[Dear  ___] : Dear  [unfilled], [Consult Letter:] : I had the pleasure of evaluating your patient, [unfilled]. [Please see my note below.] : Please see my note below. [Consult Closing:] : Thank you very much for allowing me to participate in the care of this patient.  If you have any questions, please do not hesitate to contact me. [Sincerely,] : Sincerely, [FreeTextEntry3] : Darrell Feliciano M.D., Ph.D. DPN-N\par [facility]\par Neurology at [place]\par Medical Director of Stroke Services\par [facility]\par

## 2021-07-06 ENCOUNTER — NON-APPOINTMENT (OUTPATIENT)
Age: 26
End: 2021-07-06

## 2021-07-07 ENCOUNTER — NON-APPOINTMENT (OUTPATIENT)
Age: 26
End: 2021-07-07

## 2021-07-07 VITALS — HEIGHT: 61 IN | BODY MASS INDEX: 52.72 KG/M2

## 2021-07-07 VITALS — WEIGHT: 279 LBS

## 2021-07-27 ENCOUNTER — TRANSCRIPTION ENCOUNTER (OUTPATIENT)
Age: 26
End: 2021-07-27

## 2021-07-27 ENCOUNTER — APPOINTMENT (OUTPATIENT)
Dept: NEUROLOGY | Facility: CLINIC | Age: 26
End: 2021-07-27
Payer: MEDICAID

## 2021-07-27 VITALS — TEMPERATURE: 98.6 F | HEIGHT: 61 IN | BODY MASS INDEX: 52.67 KG/M2 | WEIGHT: 279 LBS

## 2021-07-27 DIAGNOSIS — G43.709 CHRONIC MIGRAINE W/OUT AURA, NOT INTRACTABLE, W/OUT STATUS MIGRAINOSUS: ICD-10-CM

## 2021-07-27 PROCEDURE — 99214 OFFICE O/P EST MOD 30 MIN: CPT

## 2021-07-27 RX ORDER — RIMEGEPANT SULFATE 75 MG/75MG
75 TABLET, ORALLY DISINTEGRATING ORAL
Qty: 8 | Refills: 5 | Status: COMPLETED | COMMUNITY
Start: 2020-11-02 | End: 2021-07-27

## 2021-07-27 NOTE — ASSESSMENT
[FreeTextEntry1] : This is a 26-year-old woman with tension/migraine type headache. She was having paresthesia and palpitations from Topamax 50 mg twice a day. It does just try to take one at night and see if this may cause less side effects. If this does cause side effects we'll have to think of an alternate medication for her. I will see her back in 2 months, sooner should the need arise.

## 2021-07-27 NOTE — PHYSICAL EXAM

## 2021-07-27 NOTE — HISTORY OF PRESENT ILLNESS
[FreeTextEntry1] : Initial office visit May 25, 2021:\par This is a 25-year-old woman who presents today for neurologic evaluation of headache. She's had chronic headaches which have been labeled migraines. They used to be retro-orbital but now they're bifrontal in the wrap around her head. She does get some high pain with it however she does not endorse photophobia or nausea or vomiting as being associated headaches. She had been given Amerge without any benefit per the chart. She's currently taking Emgality injections without a change in her headache. She is here today to transfer her neurologic care for her migraine headaches.\par \par Followup July 27, 2021:\par This is a 26-year-old woman who presents today for neurologic followup of headache. Aaron a combination of what sounds like tension and migraine-type headache. She's been tried on multiple medications without benefit. I tried to start Topamax 50 mg twice a day. She said this made her feel weird and she had some palpitations. She is taking Tylenol now for her headaches. She states she wakes up most mornings without headache. She is here today for neurologic followup.

## 2021-07-27 NOTE — CONSULT LETTER
[Dear  ___] : Dear  [unfilled], [Courtesy Letter:] : I had the pleasure of seeing your patient, [unfilled], in my office today. [Please see my note below.] : Please see my note below. [Consult Closing:] : Thank you very much for allowing me to participate in the care of this patient.  If you have any questions, please do not hesitate to contact me. [Sincerely,] : Sincerely, [FreeTextEntry3] : Darrell Feliciano M.D., Ph.D. DPN-N\par [facility]\par Neurology at [place]\par Medical Director of Stroke Services\par [facility]\par

## 2021-07-29 ENCOUNTER — APPOINTMENT (OUTPATIENT)
Dept: ORTHOPEDIC SURGERY | Facility: CLINIC | Age: 26
End: 2021-07-29
Payer: MEDICAID

## 2021-07-29 DIAGNOSIS — M25.572 PAIN IN LEFT ANKLE AND JOINTS OF LEFT FOOT: ICD-10-CM

## 2021-07-29 PROCEDURE — 99204 OFFICE O/P NEW MOD 45 MIN: CPT

## 2021-07-29 NOTE — HISTORY OF PRESENT ILLNESS
[FreeTextEntry1] : JUNO SEGOVIA is a 26 year old female who presents for initial evaluation of bilateral ankle pain. This is her 4th opinion, was told by 2 Dr.  that she needs surgery.

## 2021-07-29 NOTE — PHYSICAL EXAM
[de-identified] : General: Alert and oriented x3. In no acute distress. Pleasant in nature with normal affect. No apparent respiratory distress. \par \par Bilateral Ankle Exam.\par Skin: Clean, dry, intact\par Inspection: No obvious malalignment, no swelling, no effusion; no lymphadenopathy\par Pulses: 2+ DP/PT pulses\par ROM: Bilateral Ankle 10 degrees dorsiflexion, 40 degrees plantarflexion, 10 degrees of subtalar motion\par Tenderness: (+) Achilles, medial and lateral joint line tenderness. No tenderness over the lateral malleolus, no CFL/ATFL/PTFL pain. No medial malleolus pain, no deltoid ligament pain. No proximal fibular pain. No heel pain.\par Stability: Negative anterior/posterior drawer.\par Strength: 5/5 TA/GS/EHL\par Neuro: In tact to light touch throughout\par Additional Tests: Negative Mortons test, negative tarsal tunnel tinels, negative single heel raise.\par \par  [de-identified] : MRI from 7/29/21 of left ankle reviewed, findings:\par Remote ligamentous injury. Findings that can be seen in the setting of chronic plantar fasciitis.\par MRI from 7/29/21 of right ankle reviewed, findings:\par Chronic sprain of the calcaneofibular ligament. Mild abnormal soft tissue in the lateral gutter which can be seen in the setting of anterolateral impingement. Correlate with clinical exam.

## 2021-07-29 NOTE — ADDENDUM
[FreeTextEntry1] : Medical record entries made by the scribe today, were at my direction and personally dictated to them by me, Dr. Monster Hagan on 07/29/2021. I have reviewed the chart and agree that the record accurately reflects my personal performance of the history, physical exam, assessment and plan.\par \par

## 2021-07-29 NOTE — DISCUSSION/SUMMARY
[de-identified] : Today I had a lengthy discussion with the patient regarding their bilateral ankle pain. I have addressed all the patient's concerns surrounding the pathology of their condition.\par \par I recommend the patient undergo a course of physical therapy for the bilateral feet 2-3 times a week for a total of 6-8 weeks. A prescription was given for the physical therapy today.\par \par I would like to see the patient back in the office in 2 months to reassess their condition.\par \par The patient understood and verbally agreed to the treatment plan. All of their questions were answered and they were satisfied with the visit. The patient should call the office if they have any questions or experience worsening symptoms.\par \par \par

## 2021-08-09 ENCOUNTER — APPOINTMENT (OUTPATIENT)
Dept: FAMILY MEDICINE | Facility: CLINIC | Age: 26
End: 2021-08-09
Payer: MEDICAID

## 2021-08-09 VITALS
DIASTOLIC BLOOD PRESSURE: 70 MMHG | WEIGHT: 278 LBS | SYSTOLIC BLOOD PRESSURE: 124 MMHG | TEMPERATURE: 98.2 F | HEIGHT: 61 IN | BODY MASS INDEX: 52.49 KG/M2 | OXYGEN SATURATION: 97 % | HEART RATE: 118 BPM

## 2021-08-09 DIAGNOSIS — M65.4 RADIAL STYLOID TENOSYNOVITIS [DE QUERVAIN]: ICD-10-CM

## 2021-08-09 PROCEDURE — 99214 OFFICE O/P EST MOD 30 MIN: CPT | Mod: 25

## 2021-08-09 NOTE — ASSESSMENT
[FreeTextEntry1] : dequervains tenosynovitis - mobic qhs prn. Possible adverse effects discussed with pt\par weight gain, morbid obesity - Advised lifestyle modifications for wt loss. Healthy diet and regular exercise regimen discussed w/ pt. refer to nutritionist. check a1c, lipids\par bipolar depression - cont meds, f/u with psych. Pt requests CBT

## 2021-08-09 NOTE — PHYSICAL EXAM
[Normal] : normal rate, regular rhythm, normal S1 and S2 and no murmur heard [de-identified] : + [de-identified] : +Finkelstein testing on R hand

## 2021-08-09 NOTE — HISTORY OF PRESENT ILLNESS
[FreeTextEntry8] : Pt c/o burning sensation w/ pain in R forearm and wrist + fingers, usually worse at night but can occur during day x 2 weeks. Advil 400mg 2x a day does not help pain. Pt reports pain is worse w/ movement. Denies trauma. \par Pt reports weight gain of 18+ lbs in past 6-8 months. Pt has poor diet and snacks often on chips. Pt was found to be prediabetic on prior labs and would like to recheck her a1c.

## 2021-08-09 NOTE — HEALTH RISK ASSESSMENT
[0] : 1) Little interest or pleasure doing things: Not at all (0) [3] : 2) Feeling down, depressed, or hopeless for nearly every day (3) [1/2 of Days or More (2)] : 5.) Poor appetite or overeating? Half the days or more [Not at All (0)] : 6.) Feeling bad about yourself, or that you are a failure, or have let yourself or your family down? Not at all [Nearly Every Day (3)] : 7.) Trouble concentrating on things, such as reading a newspaper or watching television? Nearly every day [Several Days (1)] : 8.) Moving or speaking so slowly that other people could have noticed, or the opposite, moving or speaking faster than usual? Several days [Moderate] : severity of depression is moderate [Not at all] : How difficult have these problems made it for you to do your work, take care of things at home, or get along with people? Not at all [PHQ-9 Positive] : PHQ-9 Positive [I have developed a follow-up plan documented below in the note.] : I have developed a follow-up plan documented below in the note. [[id number]] : 14

## 2021-08-12 LAB
ALBUMIN SERPL ELPH-MCNC: 3.9 G/DL
ALP BLD-CCNC: 88 U/L
ALT SERPL-CCNC: 14 U/L
ANION GAP SERPL CALC-SCNC: 15 MMOL/L
AST SERPL-CCNC: 13 U/L
BASOPHILS # BLD AUTO: 0.05 K/UL
BASOPHILS NFR BLD AUTO: 0.4 %
BILIRUB SERPL-MCNC: <0.2 MG/DL
BUN SERPL-MCNC: 9 MG/DL
CALCIUM SERPL-MCNC: 9.4 MG/DL
CHLORIDE SERPL-SCNC: 104 MMOL/L
CHOLEST SERPL-MCNC: 154 MG/DL
CO2 SERPL-SCNC: 22 MMOL/L
CREAT SERPL-MCNC: 0.81 MG/DL
EOSINOPHIL # BLD AUTO: 0.15 K/UL
EOSINOPHIL NFR BLD AUTO: 1.2 %
ESTIMATED AVERAGE GLUCOSE: 111 MG/DL
GLUCOSE SERPL-MCNC: 98 MG/DL
HBA1C MFR BLD HPLC: 5.5 %
HCT VFR BLD CALC: 42.4 %
HDLC SERPL-MCNC: 45 MG/DL
HGB BLD-MCNC: 12.9 G/DL
IMM GRANULOCYTES NFR BLD AUTO: 0.7 %
LDLC SERPL CALC-MCNC: 81 MG/DL
LYMPHOCYTES # BLD AUTO: 3.38 K/UL
LYMPHOCYTES NFR BLD AUTO: 26.7 %
MAN DIFF?: NORMAL
MCHC RBC-ENTMCNC: 25 PG
MCHC RBC-ENTMCNC: 30.4 GM/DL
MCV RBC AUTO: 82.2 FL
MONOCYTES # BLD AUTO: 0.72 K/UL
MONOCYTES NFR BLD AUTO: 5.7 %
NEUTROPHILS # BLD AUTO: 8.27 K/UL
NEUTROPHILS NFR BLD AUTO: 65.3 %
NONHDLC SERPL-MCNC: 108 MG/DL
PLATELET # BLD AUTO: 388 K/UL
POTASSIUM SERPL-SCNC: 4.2 MMOL/L
PROT SERPL-MCNC: 7 G/DL
RBC # BLD: 5.16 M/UL
RBC # FLD: 16.5 %
SODIUM SERPL-SCNC: 141 MMOL/L
TRIGL SERPL-MCNC: 138 MG/DL
WBC # FLD AUTO: 12.66 K/UL

## 2021-08-19 ENCOUNTER — APPOINTMENT (OUTPATIENT)
Dept: ORTHOPEDIC SURGERY | Facility: CLINIC | Age: 26
End: 2021-08-19
Payer: MEDICAID

## 2021-08-19 PROCEDURE — 99213 OFFICE O/P EST LOW 20 MIN: CPT

## 2021-08-19 PROCEDURE — 73630 X-RAY EXAM OF FOOT: CPT | Mod: 26,RT

## 2021-08-19 NOTE — PHYSICAL EXAM
[de-identified] : General: Alert and oriented x3. In no acute distress. Pleasant in nature with normal affect. No apparent respiratory distress. \par \par Bilateral Ankle Exam.\par Skin: Clean, dry, intact\par Inspection: No obvious malalignment, no swelling, no effusion; no lymphadenopathy\par Pulses: 2+ DP/PT pulses\par ROM: Bilateral Ankle 10 degrees dorsiflexion, 40 degrees plantarflexion, 10 degrees of subtalar motion\par Tenderness: (+) Achilles, medial and lateral joint line tenderness. No tenderness over the lateral malleolus, no CFL/ATFL/PTFL pain. No medial malleolus pain, no deltoid ligament pain. No proximal fibular pain. No heel pain.\par Stability: Negative anterior/posterior drawer.\par Strength: 5/5 TA/GS/EHL\par Neuro: In tact to light touch throughout\par Additional Tests: Negative Mortons test, negative tarsal tunnel tinels, negative single heel raise.\par \par \par Right foot Physical Examination:\par \par General: Alert and oriented x3.  In no acute distress.  Pleasant in nature with a normal affect.  No apparent respiratory distress. \par Erythema, Warmth, Rubor: Negative\par Swelling: Positive\par \par ROM Ankle:\par 1. Dorsiflexion: 10 degrees\par 2. Plantarflexion: 40 degrees\par 3. Inversion: 20 degrees\par 4. Eversion: 10 degrees\par \par ROM of digits: Normal\par \par Pes Planus: Negative\par Pes Cavus: Negative\par \par Bunion: Negative\par Yodit's Bunion (Bunionette): Negative\par Hammer Toe Deformity/Deformities: Negative\par \par Tenderness to Palpation: \par 1. Heel Pain: Negative\par 2. Midfoot Pain: Negative\par 3. First MTP Joint: Negative\par 4. Lis Franc Joint: Negative\par \par Tenderness Metatarsals:\par 1st MT: Negative\par 2nd MT: Negative\par 3rd MT: Positive\par 4th MT: Positive\par 5th MT: Negative\par Base of the 5th MT: Negative\par \par Ligament Pain:\par 1. Lis Franc Ligament: Negative\par 2. Plantar Fascia Ligament: Negative\par \par Strength: \par 5/5 TA/GS/EHL/FHL/EDL/ADD/ABD\par \par Pulses: 2+ DP/PT Pulses\par \par Capillary Refill Toes: <2 seconds\par \par Neuro: Intact motor and sensory throughout\par \par Additional Test:\par 1. Gonzalez's Squeeze Test: Positive\par 2. Calcaneal Squeeze Test: Negative\par  [de-identified] : X-rays of the right foot reviewed, 8/19/2021: No fractures present of the right foot.

## 2021-08-19 NOTE — HISTORY OF PRESENT ILLNESS
[FreeTextEntry1] : 8/19/21: The patient returns wearing a short cam boot on her right foot and right ankle.  The patient states that last Thursday she had increased pain in the right foot.  Since then she has burning pains in the right foot that has not subsided and has gotten worse.  She is weightbearing as tolerated with the short cam boot and placed in a short cam boot on last week due to her pain in the foot.  She had no trauma to the foot.  Her pain scale in the foot is a 7 out of 10.\par \par 7/29/21: JUNO SEGOVIA is a 26 year old female who presents for initial evaluation of bilateral ankle pain. This is her 4th opinion, was told by 2 Dr.  that she needs surgery.

## 2021-08-19 NOTE — DISCUSSION/SUMMARY
[de-identified] : At this time I would like to get an MRI of the right foot to evaluate for a stress fracture in the third or fourth metatarsals/evaluate the webspaces for possible neuroma which is causing the burning pains in her foot.  In the interim the patient will continue to wear the boot to offload her foot which is making her feel better.  She can weight-bear as tolerated with the boot.  She will use over-the-counter pain medications as needed.  Once the MRI is completed the patient will return to office to review with Dr. Hagan.  All of her questions were answered and she understood the treatment course at this time.

## 2021-08-24 ENCOUNTER — TRANSCRIPTION ENCOUNTER (OUTPATIENT)
Age: 26
End: 2021-08-24

## 2021-08-26 ENCOUNTER — APPOINTMENT (OUTPATIENT)
Dept: ORTHOPEDIC SURGERY | Facility: CLINIC | Age: 26
End: 2021-08-26
Payer: MEDICAID

## 2021-08-26 PROCEDURE — 99213 OFFICE O/P EST LOW 20 MIN: CPT

## 2021-08-26 PROCEDURE — 73610 X-RAY EXAM OF ANKLE: CPT | Mod: RT

## 2021-08-26 NOTE — DISCUSSION/SUMMARY
[de-identified] : XR films were reviewed with the patient. At this time I would like to get an MRI of the right foot to evaluate for a stress fracture in the third or fourth metatarsals/evaluate the web spaces for possible neuroma which is causing the burning pains in her foot. A right ankle MRI was ordered so I can find out more about the etiology of the patient's condition. The patient should follow up with the office after obtaining both MRIs. \par \par In the interim the patient will continue to wear the boot to offload her foot which is making her feel better.  She can weight-bear as tolerated with the boot. I recommend that the patient utilize meloxicam with food once per day as instructed. A prescription for the meloxicam was ordered for the patient in the office today. I recommend that the patient utilize ice, heat, NSAIDs prn. They can also elevate their RLE above the level of the heart. \par \par The patient understood and verbally agreed to the treatment plan. All of their questions were answered and they were satisfied with the visit. The patient should call the office if they have any questions or experience worsening symptoms.

## 2021-08-26 NOTE — ADDENDUM
[FreeTextEntry1] : I, Jesse Davis, acted solely as a scribe for Dr. Monster Hagan on this date 08/26/2021  .\par  \par All medical record entries made by the Scribe were at my, Dr. Monster Hagan, direction and personally dictated by me on 08/26/2021 . I have reviewed the chart and agree that the record accurately reflects my personal performance of the history, physical exam, assessment and plan. I have also personally directed, reviewed, and agreed with the chart.

## 2021-08-26 NOTE — HISTORY OF PRESENT ILLNESS
[FreeTextEntry1] : 8/26/2021: JUNO SEGOVIA is a 26 year old female who presents for follow-up evaluation of right foot, right ankle. She has been utilizing the short CAM boot. She has not obtained her MRI yet. She has been taking Advil with minimal relief. \par \par 8/19/21: The patient returns wearing a short cam boot on her right foot and right ankle.  The patient states that last Thursday she had increased pain in the right foot.  Since then she has burning pains in the right foot that has not subsided and has gotten worse.  She is weightbearing as tolerated with the short cam boot and placed in a short cam boot on last week due to her pain in the foot.  She had no trauma to the foot.  Her pain scale in the foot is a 7 out of 10.\par \par 7/29/21: JUNO SEGOVIA is a 26 year old female who presents for initial evaluation of bilateral ankle pain. This is her 4th opinion, was told by 2 Dr.  that she needs surgery.

## 2021-08-26 NOTE — PHYSICAL EXAM
[de-identified] : General: Alert and oriented x3. In no acute distress. Pleasant in nature with normal affect. No apparent respiratory distress. \par \par Mild Right Calf Tenderness**\par \par Right Ankle Exam.\par Skin: Clean, dry, intact\par Inspection: No obvious malalignment, mild swelling, no effusion; no lymphadenopathy\par Pulses: 2+ DP/PT pulses\par ROM: Bilateral Ankle 10 degrees dorsiflexion, 40 degrees plantarflexion, 10 degrees of subtalar motion\par Tenderness: medial and lateral joint line tenderness. No tenderness over the lateral malleolus, no CFL/ATFL/PTFL pain. No medial malleolus pain, no deltoid ligament pain. No proximal fibular pain. No heel pain.\par Stability: Negative anterior/posterior drawer.\par Strength: 5/5 TA/GS/EHL\par Neuro: In tact to light touch throughout\par Additional Tests: Negative Mortons test, negative tarsal tunnel tinels, negative single heel raise.\par \par \par Right foot Physical Examination:\par \par General: Alert and oriented x3.  In no acute distress.  Pleasant in nature with a normal affect.  No apparent respiratory distress. \par Erythema, Warmth, Rubor: Negative\par Swelling: Positive\par \par ROM Ankle:\par 1. Dorsiflexion: 10 degrees\par 2. Plantarflexion: 40 degrees\par 3. Inversion: 20 degrees\par 4. Eversion: 10 degrees\par \par ROM of digits: Normal\par \par Pes Planus: Negative\par Pes Cavus: Negative\par \par Bunion: Negative\par Yodit's Bunion (Bunionette): Negative\par Hammer Toe Deformity/Deformities: Negative\par \par Tenderness to Palpation: \par 1. Heel Pain: Negative\par 2. Midfoot Pain: Negative\par 3. First MTP Joint: Negative\par 4. Lis Franc Joint: Negative\par \par Tenderness Metatarsals:\par 1st MT: Negative\par 2nd MT: Negative\par 3rd MT: Positive\par 4th MT: Positive\par 5th MT: Negative\par Base of the 5th MT: Negative\par \par Ligament Pain:\par 1. Lis Franc Ligament: Negative\par 2. Plantar Fascia Ligament: Negative\par \par Strength: \par 5/5 TA/GS/EHL/FHL/EDL/ADD/ABD\par \par Pulses: 2+ DP/PT Pulses\par \par Capillary Refill Toes: <2 seconds\par \par Neuro: Intact motor and sensory throughout\par \par Additional Test:\par 1. Gonzalez's Squeeze Test: Positive\par 2. Calcaneal Squeeze Test: Negative\par  [de-identified] : 3V of the right ankle were ordered, obtained, and reviewed by me today, 08/26/2021 , which revealed: Os trigonum, no acute fracture.

## 2021-08-30 ENCOUNTER — NON-APPOINTMENT (OUTPATIENT)
Age: 26
End: 2021-08-30

## 2021-08-30 ENCOUNTER — APPOINTMENT (OUTPATIENT)
Dept: FAMILY MEDICINE | Facility: CLINIC | Age: 26
End: 2021-08-30

## 2021-08-30 ENCOUNTER — APPOINTMENT (OUTPATIENT)
Dept: MRI IMAGING | Facility: CLINIC | Age: 26
End: 2021-08-30

## 2021-08-30 VITALS — WEIGHT: 265 LBS

## 2021-08-30 VITALS — BODY MASS INDEX: 50.07 KG/M2 | HEIGHT: 61 IN

## 2021-09-03 ENCOUNTER — APPOINTMENT (OUTPATIENT)
Dept: FAMILY MEDICINE | Facility: CLINIC | Age: 26
End: 2021-09-03

## 2021-09-04 ENCOUNTER — TRANSCRIPTION ENCOUNTER (OUTPATIENT)
Age: 26
End: 2021-09-04

## 2021-09-07 ENCOUNTER — APPOINTMENT (OUTPATIENT)
Dept: MRI IMAGING | Facility: CLINIC | Age: 26
End: 2021-09-07
Payer: MEDICAID

## 2021-09-07 ENCOUNTER — OUTPATIENT (OUTPATIENT)
Dept: OUTPATIENT SERVICES | Facility: HOSPITAL | Age: 26
LOS: 1 days | End: 2021-09-07
Payer: MEDICAID

## 2021-09-07 DIAGNOSIS — M76.60 ACHILLES TENDINITIS, UNSPECIFIED LEG: ICD-10-CM

## 2021-09-07 DIAGNOSIS — Z98.890 OTHER SPECIFIED POSTPROCEDURAL STATES: Chronic | ICD-10-CM

## 2021-09-07 DIAGNOSIS — G93.2 BENIGN INTRACRANIAL HYPERTENSION: Chronic | ICD-10-CM

## 2021-09-07 DIAGNOSIS — M25.571 PAIN IN RIGHT ANKLE AND JOINTS OF RIGHT FOOT: ICD-10-CM

## 2021-09-07 PROCEDURE — 73721 MRI JNT OF LWR EXTRE W/O DYE: CPT

## 2021-09-07 PROCEDURE — 73721 MRI JNT OF LWR EXTRE W/O DYE: CPT | Mod: 26,RT

## 2021-09-08 ENCOUNTER — APPOINTMENT (OUTPATIENT)
Dept: PULMONOLOGY | Facility: CLINIC | Age: 26
End: 2021-09-08
Payer: MEDICAID

## 2021-09-08 VITALS
DIASTOLIC BLOOD PRESSURE: 70 MMHG | OXYGEN SATURATION: 98 % | SYSTOLIC BLOOD PRESSURE: 120 MMHG | WEIGHT: 275 LBS | HEART RATE: 101 BPM

## 2021-09-08 PROCEDURE — 99214 OFFICE O/P EST MOD 30 MIN: CPT

## 2021-09-08 RX ORDER — METHYLPREDNISOLONE 4 MG/1
4 TABLET ORAL
Qty: 1 | Refills: 0 | Status: DISCONTINUED | COMMUNITY
Start: 2020-12-08 | End: 2021-09-08

## 2021-09-08 RX ORDER — FLUTICASONE PROPIONATE 50 UG/1
50 SPRAY, METERED NASAL
Qty: 16 | Refills: 0 | Status: DISCONTINUED | COMMUNITY
Start: 2021-07-14 | End: 2021-09-08

## 2021-09-20 ENCOUNTER — OUTPATIENT (OUTPATIENT)
Dept: OUTPATIENT SERVICES | Facility: HOSPITAL | Age: 26
LOS: 1 days | End: 2021-09-20
Payer: MEDICAID

## 2021-09-20 ENCOUNTER — APPOINTMENT (OUTPATIENT)
Dept: MRI IMAGING | Facility: CLINIC | Age: 26
End: 2021-09-20
Payer: MEDICAID

## 2021-09-20 DIAGNOSIS — M77.41 METATARSALGIA, RIGHT FOOT: ICD-10-CM

## 2021-09-20 DIAGNOSIS — S99.921A UNSPECIFIED INJURY OF RIGHT FOOT, INITIAL ENCOUNTER: ICD-10-CM

## 2021-09-20 DIAGNOSIS — Z98.890 OTHER SPECIFIED POSTPROCEDURAL STATES: Chronic | ICD-10-CM

## 2021-09-20 DIAGNOSIS — G93.2 BENIGN INTRACRANIAL HYPERTENSION: Chronic | ICD-10-CM

## 2021-09-20 PROCEDURE — 73718 MRI LOWER EXTREMITY W/O DYE: CPT

## 2021-09-20 PROCEDURE — 73718 MRI LOWER EXTREMITY W/O DYE: CPT | Mod: 26,RT

## 2021-09-26 ENCOUNTER — LABORATORY RESULT (OUTPATIENT)
Age: 26
End: 2021-09-26

## 2021-09-26 ENCOUNTER — APPOINTMENT (OUTPATIENT)
Dept: DISASTER EMERGENCY | Facility: CLINIC | Age: 26
End: 2021-09-26

## 2021-09-29 ENCOUNTER — TRANSCRIPTION ENCOUNTER (OUTPATIENT)
Age: 26
End: 2021-09-29

## 2021-09-29 ENCOUNTER — EMERGENCY (EMERGENCY)
Facility: HOSPITAL | Age: 26
LOS: 1 days | Discharge: OTHER INSTITUTION | End: 2021-09-29
Attending: EMERGENCY MEDICINE
Payer: COMMERCIAL

## 2021-09-29 ENCOUNTER — APPOINTMENT (OUTPATIENT)
Dept: ORTHOPEDIC SURGERY | Facility: CLINIC | Age: 26
End: 2021-09-29
Payer: MEDICAID

## 2021-09-29 ENCOUNTER — EMERGENCY (EMERGENCY)
Facility: HOSPITAL | Age: 26
LOS: 1 days | Discharge: LEFT BEFORE DISCHARGE | End: 2021-09-29
Payer: MEDICAID

## 2021-09-29 VITALS
SYSTOLIC BLOOD PRESSURE: 143 MMHG | TEMPERATURE: 99 F | DIASTOLIC BLOOD PRESSURE: 103 MMHG | HEIGHT: 60 IN | HEART RATE: 129 BPM | RESPIRATION RATE: 20 BRPM | OXYGEN SATURATION: 98 % | WEIGHT: 279.99 LBS

## 2021-09-29 DIAGNOSIS — Z98.890 OTHER SPECIFIED POSTPROCEDURAL STATES: Chronic | ICD-10-CM

## 2021-09-29 DIAGNOSIS — G93.2 BENIGN INTRACRANIAL HYPERTENSION: Chronic | ICD-10-CM

## 2021-09-29 DIAGNOSIS — F31.32 BIPOLAR DISORDER, CURRENT EPISODE DEPRESSED, MODERATE: ICD-10-CM

## 2021-09-29 LAB
ALBUMIN SERPL ELPH-MCNC: 4.4 G/DL — SIGNIFICANT CHANGE UP (ref 3.3–5.2)
ALP SERPL-CCNC: 82 U/L — SIGNIFICANT CHANGE UP (ref 40–120)
ALT FLD-CCNC: 18 U/L — SIGNIFICANT CHANGE UP
AMPHET UR-MCNC: NEGATIVE — SIGNIFICANT CHANGE UP
ANION GAP SERPL CALC-SCNC: 16 MMOL/L — SIGNIFICANT CHANGE UP (ref 5–17)
APAP SERPL-MCNC: <3 UG/ML — LOW (ref 10–26)
APPEARANCE UR: CLEAR — SIGNIFICANT CHANGE UP
AST SERPL-CCNC: 14 U/L — SIGNIFICANT CHANGE UP
BACTERIA # UR AUTO: ABNORMAL
BARBITURATES UR SCN-MCNC: NEGATIVE — SIGNIFICANT CHANGE UP
BASOPHILS # BLD AUTO: 0.03 K/UL — SIGNIFICANT CHANGE UP (ref 0–0.2)
BASOPHILS NFR BLD AUTO: 0.3 % — SIGNIFICANT CHANGE UP (ref 0–2)
BENZODIAZ UR-MCNC: NEGATIVE — SIGNIFICANT CHANGE UP
BILIRUB SERPL-MCNC: <0.2 MG/DL — LOW (ref 0.4–2)
BILIRUB UR-MCNC: NEGATIVE — SIGNIFICANT CHANGE UP
BUN SERPL-MCNC: 7.1 MG/DL — LOW (ref 8–20)
CALCIUM SERPL-MCNC: 9.4 MG/DL — SIGNIFICANT CHANGE UP (ref 8.6–10.2)
CHLORIDE SERPL-SCNC: 102 MMOL/L — SIGNIFICANT CHANGE UP (ref 98–107)
CO2 SERPL-SCNC: 22 MMOL/L — SIGNIFICANT CHANGE UP (ref 22–29)
COCAINE METAB.OTHER UR-MCNC: NEGATIVE — SIGNIFICANT CHANGE UP
COLOR SPEC: YELLOW — SIGNIFICANT CHANGE UP
CREAT SERPL-MCNC: 0.83 MG/DL — SIGNIFICANT CHANGE UP (ref 0.5–1.3)
DIFF PNL FLD: NEGATIVE — SIGNIFICANT CHANGE UP
EOSINOPHIL # BLD AUTO: 0.03 K/UL — SIGNIFICANT CHANGE UP (ref 0–0.5)
EOSINOPHIL NFR BLD AUTO: 0.3 % — SIGNIFICANT CHANGE UP (ref 0–6)
EPI CELLS # UR: SIGNIFICANT CHANGE UP
ETHANOL SERPL-MCNC: <10 MG/DL — SIGNIFICANT CHANGE UP (ref 0–9)
GLUCOSE SERPL-MCNC: 101 MG/DL — HIGH (ref 70–99)
GLUCOSE UR QL: NEGATIVE MG/DL — SIGNIFICANT CHANGE UP
HCG SERPL-ACNC: <4 MIU/ML — SIGNIFICANT CHANGE UP
HCT VFR BLD CALC: 42.3 % — SIGNIFICANT CHANGE UP (ref 34.5–45)
HGB BLD-MCNC: 13.4 G/DL — SIGNIFICANT CHANGE UP (ref 11.5–15.5)
IMM GRANULOCYTES NFR BLD AUTO: 0.3 % — SIGNIFICANT CHANGE UP (ref 0–1.5)
KETONES UR-MCNC: NEGATIVE — SIGNIFICANT CHANGE UP
LEUKOCYTE ESTERASE UR-ACNC: ABNORMAL
LYMPHOCYTES # BLD AUTO: 1.51 K/UL — SIGNIFICANT CHANGE UP (ref 1–3.3)
LYMPHOCYTES # BLD AUTO: 13.1 % — SIGNIFICANT CHANGE UP (ref 13–44)
MCHC RBC-ENTMCNC: 25.2 PG — LOW (ref 27–34)
MCHC RBC-ENTMCNC: 31.7 GM/DL — LOW (ref 32–36)
MCV RBC AUTO: 79.7 FL — LOW (ref 80–100)
METHADONE UR-MCNC: NEGATIVE — SIGNIFICANT CHANGE UP
MONOCYTES # BLD AUTO: 0.64 K/UL — SIGNIFICANT CHANGE UP (ref 0–0.9)
MONOCYTES NFR BLD AUTO: 5.6 % — SIGNIFICANT CHANGE UP (ref 2–14)
NEUTROPHILS # BLD AUTO: 9.24 K/UL — HIGH (ref 1.8–7.4)
NEUTROPHILS NFR BLD AUTO: 80.4 % — HIGH (ref 43–77)
NITRITE UR-MCNC: NEGATIVE — SIGNIFICANT CHANGE UP
OPIATES UR-MCNC: NEGATIVE — SIGNIFICANT CHANGE UP
PCP SPEC-MCNC: SIGNIFICANT CHANGE UP
PCP UR-MCNC: NEGATIVE — SIGNIFICANT CHANGE UP
PH UR: 7 — SIGNIFICANT CHANGE UP (ref 5–8)
PLATELET # BLD AUTO: 409 K/UL — HIGH (ref 150–400)
POTASSIUM SERPL-MCNC: 4 MMOL/L — SIGNIFICANT CHANGE UP (ref 3.5–5.3)
POTASSIUM SERPL-SCNC: 4 MMOL/L — SIGNIFICANT CHANGE UP (ref 3.5–5.3)
PROT SERPL-MCNC: 7.7 G/DL — SIGNIFICANT CHANGE UP (ref 6.6–8.7)
PROT UR-MCNC: 30 MG/DL
RBC # BLD: 5.31 M/UL — HIGH (ref 3.8–5.2)
RBC # FLD: 15.8 % — HIGH (ref 10.3–14.5)
RBC CASTS # UR COMP ASSIST: SIGNIFICANT CHANGE UP /HPF (ref 0–4)
SALICYLATES SERPL-MCNC: <0.6 MG/DL — LOW (ref 10–20)
SODIUM SERPL-SCNC: 140 MMOL/L — SIGNIFICANT CHANGE UP (ref 135–145)
SP GR SPEC: 1 — LOW (ref 1.01–1.02)
THC UR QL: NEGATIVE — SIGNIFICANT CHANGE UP
UROBILINOGEN FLD QL: NEGATIVE MG/DL — SIGNIFICANT CHANGE UP
WBC # BLD: 11.49 K/UL — HIGH (ref 3.8–10.5)
WBC # FLD AUTO: 11.49 K/UL — HIGH (ref 3.8–10.5)
WBC UR QL: SIGNIFICANT CHANGE UP

## 2021-09-29 PROCEDURE — 99214 OFFICE O/P EST MOD 30 MIN: CPT

## 2021-09-29 PROCEDURE — 99285 EMERGENCY DEPT VISIT HI MDM: CPT | Mod: 25

## 2021-09-29 PROCEDURE — 90792 PSYCH DIAG EVAL W/MED SRVCS: CPT

## 2021-09-29 PROCEDURE — L9992: CPT

## 2021-09-29 PROCEDURE — 93010 ELECTROCARDIOGRAM REPORT: CPT

## 2021-09-29 RX ORDER — ARIPIPRAZOLE 15 MG/1
10 TABLET ORAL DAILY
Refills: 0 | Status: DISCONTINUED | OUTPATIENT
Start: 2021-09-29 | End: 2021-10-04

## 2021-09-29 RX ORDER — ESCITALOPRAM OXALATE 10 MG/1
15 TABLET, FILM COATED ORAL DAILY
Refills: 0 | Status: DISCONTINUED | OUTPATIENT
Start: 2021-09-30 | End: 2021-10-04

## 2021-09-29 RX ORDER — ALPRAZOLAM 0.25 MG
0.25 TABLET ORAL
Refills: 0 | Status: DISCONTINUED | OUTPATIENT
Start: 2021-09-29 | End: 2021-09-30

## 2021-09-29 RX ORDER — IBUPROFEN 200 MG
600 TABLET ORAL ONCE
Refills: 0 | Status: COMPLETED | OUTPATIENT
Start: 2021-09-29 | End: 2021-09-30

## 2021-09-29 RX ORDER — ONDANSETRON 8 MG/1
4 TABLET, FILM COATED ORAL ONCE
Refills: 0 | Status: COMPLETED | OUTPATIENT
Start: 2021-09-29 | End: 2021-09-29

## 2021-09-29 RX ADMIN — Medication 0.25 MILLIGRAM(S): at 22:36

## 2021-09-29 RX ADMIN — ONDANSETRON 4 MILLIGRAM(S): 8 TABLET, FILM COATED ORAL at 21:31

## 2021-09-29 RX ADMIN — Medication 15 MILLIGRAM(S): at 22:20

## 2021-09-29 RX ADMIN — Medication 2 MILLIGRAM(S): at 20:35

## 2021-09-29 NOTE — ED PROVIDER NOTE - NSICDXPASTSURGICALHX_GEN_ALL_CORE_FT
PAST SURGICAL HISTORY:  H/O endoscopy     RIP (raised intracranial pressure) monitored but was negative - 2013

## 2021-09-29 NOTE — PHARMACOTHERAPY INTERVENTION NOTE - COMMENTS
Patient presenting with multiple vials of medication. Meds sealed in envelopes per protocol and given to security to hold while patient in hospital. MD/RN aware. Medications stored are:  - Escitalopram 10 mG and 5 mG  - Alprazolam 0.25 mG, 2 vials  - Sertraline 100 mG  - Buspirone 15 mG  - Abilify 10 mG  - Mupirocin 2 % topical

## 2021-09-29 NOTE — ADDENDUM
[FreeTextEntry1] : I, Aranza Del Rio, acted solely as a scribe for Dr. Monster Hagan on this date 09/29/2021.\par \par All medical record entries made by the Scribe were at my, Dr. Monster Hagan, direction and personally dictated by me on 09/29/2021 . I have reviewed the chart and agree that the record accurately reflects my personal performance of the history, physical exam, assessment and plan. I have also personally directed, reviewed, and agreed with the chart.	\par

## 2021-09-29 NOTE — DISCUSSION/SUMMARY
[de-identified] : Today I had a lengthy discussion with the patient regarding their right ankle and foot pain. I have addressed all the patient's concerns surrounding the pathology of their condition. MRI results were reviewed with the patient today in the clinic. Further, we discussed both operative and nonoperative treatment options in the office. At this time, I am recommending that the patient undergo conservative management for symptomatic relief.\par \par I recommend that the patient utilize ice, NSAIDs, and heat PRN. They can also elevate their right ankle and foot above the level of the heart. Further, I recommend that the patient utilize Voltaren gel topically. If the Voltaren gel could not be obtained, Icy Hot, Biofreeze, or Bengay can be utilized instead.\par \par A discussion was had about utilizing OTC orthotics. The patient was educated about orthotics in the office today with recommendations given for Dr. Stafford's. Will be considering possibly custom orthotics upon further evaluation. \par 		\par The patient understood and verbally agreed to the treatment plan. All of their questions were answered and they were satisfied with the visit. The patient should call the office if they have any questions or experience worsening symptoms. I would like to see the patient back in the office in 6-8 weeks PRN to reassess their condition. 				\par

## 2021-09-29 NOTE — ED PROVIDER NOTE - OBJECTIVE STATEMENT
27 y/o F with PMH with PMh developmental delay, depression, pseudotumor cerebri, bipolar disorder presents for suicide attempt that she describes as "shaking my head really hard with my hands" because the voices, that she has been hearing for a long time, told her to. She denies any plan to hurt anyone else. She states her friend helped her call 911 today. She has been hospitalized for this in the past and has been compliant with her meds.

## 2021-09-29 NOTE — ED BEHAVIORAL HEALTH ASSESSMENT NOTE - DESCRIPTION
see HPI Vital Signs Last 24 Hrs  T(C): 37 (29 Sep 2021 19:30), Max: 37.2 (29 Sep 2021 18:26)  T(F): 98.6 (29 Sep 2021 19:30), Max: 98.9 (29 Sep 2021 18:26)  HR: 133 (29 Sep 2021 19:30) (129 - 133)  BP: 134/82 (29 Sep 2021 19:30) (134/82 - 143/103)  BP(mean): --  RR: 18 (29 Sep 2021 19:30) (18 - 20)  SpO2: 98% (29 Sep 2021 19:30) (98% - 98%) denies

## 2021-09-29 NOTE — ED ADULT TRIAGE NOTE - CHIEF COMPLAINT QUOTE
pt admits to hearing voices that are telling her to choke herself. pt admits to choking herself with her hands in an attempt to harm herself today and last week,

## 2021-09-29 NOTE — ED PROVIDER NOTE - NSICDXPASTMEDICALHX_GEN_ALL_CORE_FT
PAST MEDICAL HISTORY:  Bipolar disease, chronic     Depression     Developmental delay     Lyme disease at age 5, resolved    Lyme disease     Mentally challenged     Parotid hypertrophy Left    Pseudotumor cerebri     Vesicoureteral reflux in early infancy, resolved.

## 2021-09-29 NOTE — ED PROVIDER NOTE - NS ED ROS FT
Const: Denies fever, chills  HEENT: Denies blurry vision, sore throat  Neck: Denies neck pain/stiffness  Resp: Denies coughing, SOB  Cardiovascular: + palpitations. Denies CP, LE edema  GI: Denies nausea, vomiting, abdominal pain, diarrhea, constipation, blood in stool  : Denies urinary frequency/urgency/dysuria, hematuria  MSK: Denies back pain  Neuro: Denies HA, dizziness, numbness, weakness  Skin: Denies rashes.   Psych: + SI, +auditory hallucinations, denies SI.

## 2021-09-29 NOTE — ED BEHAVIORAL HEALTH ASSESSMENT NOTE - SUMMARY
Patient is a 26 year old female who is unemployed, lives with mother, brother and grand parents with a past psychiatric history of Bipolar disorder and intellectual disability with multiple past inpatient admissions that was BIBA for evaluation of possible suicidal ideation and AH     Patient seen and evaluated and with worsening depression, and auditory hallucinations to hurt herself. Patient is a danger to self and is to be admitted under DOCS

## 2021-09-29 NOTE — PHYSICAL EXAM
[de-identified] : General: Alert and oriented x3. In no acute distress. Pleasant in nature with normal affect. No apparent respiratory distress. \par \par Mild Right Calf Tenderness**\par \par Right Ankle Exam.\par Skin: Clean, dry, intact\par Inspection: No obvious malalignment, mild swelling, no effusion; no lymphadenopathy\par Pulses: 2+ DP/PT pulses\par ROM: Bilateral Ankle 10 degrees dorsiflexion, 40 degrees plantarflexion, 10 degrees of subtalar motion\par Tenderness: + medial and lateral joint line tenderness. No tenderness over the lateral malleolus, no CFL/ATFL/PTFL pain. No medial malleolus pain, no deltoid ligament pain. No proximal fibular pain. No heel pain.\par Stability: Negative anterior/posterior drawer.\par Strength: 5/5 TA/GS/EHL\par Neuro: In tact to light touch throughout\par Additional Tests: Negative Mortons test, negative tarsal tunnel tinels, negative single heel raise.\par \par \par Right foot Physical Examination:\par \par General: Alert and oriented x3.  In no acute distress.  Pleasant in nature with a normal affect.  No apparent respiratory distress. \par Erythema, Warmth, Rubor: Negative\par Swelling: Positive\par \par ROM Ankle:\par 1. Dorsiflexion: 10 degrees\par 2. Plantarflexion: 40 degrees\par 3. Inversion: 20 degrees\par 4. Eversion: 10 degrees\par \par ROM of digits: Normal\par \par Pes Planus: Negative\par Pes Cavus: Negative\par \par Bunion: Negative\par Yodit's Bunion (Bunionette): Negative\par Hammer Toe Deformity/Deformities: Negative\par \par Tenderness to Palpation: \par 1. Heel Pain: Negative\par 2. Midfoot Pain: Negative\par 3. First MTP Joint: Negative\par 4. Lis Franc Joint: Negative\par \par Tenderness Metatarsals:\par 1st MT: Negative\par 2nd MT: Negative\par 3rd MT: Positive\par 4th MT: Positive\par 5th MT: Negative\par Base of the 5th MT: Negative\par \par Ligament Pain:\par 1. Lis Franc Ligament: Negative\par 2. Plantar Fascia Ligament: Negative\par \par Strength: \par 5/5 TA/GS/EHL/FHL/EDL/ADD/ABD\par \par Pulses: 2+ DP/PT Pulses\par \par Capillary Refill Toes: <2 seconds\par \par Neuro: Intact motor and sensory throughout\par \par Additional Test:\par 1. Gonzalez's Squeeze Test: Positive\par 2. Calcaneal Squeeze Test: Negative\par  [de-identified] : \par EXAM: MR FOOT RT\par \par PROCEDURE DATE: 09/20/2021\par \par \par \par INTERPRETATION: CLINICAL INFORMATION: Pain at the midfoot after fall\par TECHNIQUE: Multiplanar, multisequence MRI was obtained of the right midfoot and forefoot.\par COMPARISON: None available.\par \par FINDINGS:\par \par MUSCLES AND TENDONS: The tendons are intact. No tear or retraction. Normal musculature\par PLANTAR FASCIA: Preserved\par LIGAMENTS: Intact Lisfranc ligament.\par CARTILAGE and SUBCHONDRAL BONE: Preserved hyaline cartilage. No full-thickness cartilage loss or subchondral marrow edema.\par SYNOVIUM/JOINT FLUID: No joint effusion\par MARROW: There is no fracture or osteonecrosis.\par NEUROVASCULAR STRUCTURES: Preserved\par PERIPHERAL/ SUB-CUTANEOUS SOFT TISSUES: Small fluid in the first and second web space. No neuroma is identified. Small focus of susceptibility artifacts in the plantar soft tissues of the fourth digit, possibly a small foreign body. Mild callus formation below the second metatarsal head.\par \par IMPRESSION:\par First and second web space bursitis.\par No neuroma or stress fracture\par \par --- End of Report ---\par \par \par \par KHANH PAGE MD; Attending Radiologist\par This document has been electronically signed. Sep 24 2021 4:31PM\par \par \par \par \par EXAM: MR ANKLE RT\par \par PROCEDURE DATE: 09/07/2021\par \par \par \par INTERPRETATION: MR ANKLE RIGHT\par \par CLINICAL INFORMATION: MRI right ankle - right ankle pain atraumatic with pain and swelling with motion. Achilles tendinitis. Evaluate for effusion or stress fracture\par TECHNIQUE: Multiplanar, multisequence MRI was obtained of the right ankle.\par COMPARISON: Radiographs of the right ankle 9/1/2020, MR of the right ankle 8/14/2020.\par \par FINDINGS:\par \par MUSCLES: No muscle atrophy or edema.\par \par TENDONS\par Posterior Tibialis/Medial Flexor Tendons: Intact. Unchanged small tendon sheath effusion and small tendon sheath ganglion adjacent to the flexor digitorum longus tendon (3, 16). A second ganglion cyst is seen arising adjacent to the flexor pollicis longus tendon (7, 18).\par Peroneal Tendons: Intact.\par Extensor Tendons: Intact.\par \par ACHILLES TENDON\par Intact. Redemonstrated mild soft tissue edema surrounding the mid and distal portions of the Achilles tendon. Small fluid within the retrocalcaneal bursa. Appearance is similar to prior study.\par \par LIGAMENTS\par Talofibular/Calcaneofibular Ligaments: Intact.\par Tibiofibular/Syndesmotic Ligaments: Intact.\par Medial Deltoid Ligament Complex: Intact.\par Calcaneonavicular Spring Ligament Complex: Intact.\par \par CARTILAGE and SUBCHONDRAL BONE: Articular cartilage is intact. No subchondral edema. No dislocation.\par \par SYNOVIUM/JOINT FLUID: Trace tibiotalar effusion.\par \par OSSEOUS STRUCTURES\par Acute Fractures/Contusions: None. No marrow signal changes to suggest stress reaction. No osteonecrosis.\par Chronic Fractures/Ossifications: None.\par Tarsal Coalition: None.\par \par PLANTAR FASCIA\par Intact. Unchanged fatty prominence versus lipoma between the abductor pollicis longus and proximal aspect of the medial cord of the plantar fascia (4, 24).\par \par NEUROVASCULAR STRUCTURES\par Tarsal Tunnel: Preserved.\par \par SINUS TARSI: Sinus Tarsi fat is preserved.\par \par PERIPHERAL/ SUB-CUTANEOUS SOFT TISSUES: Mild scattered subcutaneous edema\par \par IMPRESSION:\par 1. Intact Achilles tendon, with unchanged mild Achilles peritendinitis.\par 2. Unchanged tendon sheath ganglia adjacent to the flexor hallucis longus and flexor digitorum longus tendons.\par 3. No acute fracture. No stress reaction.\par 4. Trace tibiotalar joint effusion.\par \par --- End of Report ---\par \par \par \par YEIMI PICKETT MD; Resident Radiology\par This document has been electronically signed.\par ELVA SEVERINO MD; Attending Radiologist\par This document has been electronically signed. Sep 8 2021 4:59PM

## 2021-09-29 NOTE — ED ADULT NURSE NOTE - HPI (INCLUDE ILLNESS QUALITY, SEVERITY, DURATION, TIMING, CONTEXT, MODIFYING FACTORS, ASSOCIATED SIGNS AND SYMPTOMS)
received pt in [facility].  waned by security for contraband. pt is ao speech is slow and simple.  as per pt she is hearing voices to hurt her self for the past 4-5 weeks.  as per pt she tired to choke herself with her hands a few weeks ago and also did it yesterday. I also hit my head with my hand.  as per pt last admitted in 2018 for the same.  pt denies past s/a.  as per pt she also sees the people that are talking.  pt denies etoh/drug use.  denies smoking.  pt admits to hx bipolar, schizophrenia and depression.  she is unable to  recall her medication names.  pt lives at home with mother, brother and grandfather.  as per pt her friend called janessa and then janessa called her an ambulance.

## 2021-09-29 NOTE — ED PROVIDER NOTE - PHYSICAL EXAMINATION
Const: Awake, alert and oriented. In no acute distress. Well appearing.  HEENT: NC/AT. Moist mucous membranes.  Eyes: No scleral icterus. EOMI.  Neck:. Soft and supple. Full ROM without pain.  Cardiac: Tachycardic rate and regular rhythm. +S1/S2. No murmurs. Peripheral pulses 2+ and symmetric. No LE edema.  Resp: Speaking in full sentences. No evidence of respiratory distress. No wheezes, rales or rhonchi.  Abd: Soft, non-tender, non-distended. Normal bowel sounds in all 4 quadrants. No guarding or rebound.  Back: Spine midline and non-tender. No CVAT.  Skin: No rashes, abrasions or lacerations.  Neuro: Awake, alert & oriented x 3. Moves all extremities symmetrically.

## 2021-09-29 NOTE — ED BEHAVIORAL HEALTH ASSESSMENT NOTE - HPI (INCLUDE ILLNESS QUALITY, SEVERITY, DURATION, TIMING, CONTEXT, MODIFYING FACTORS, ASSOCIATED SIGNS AND SYMPTOMS)
Patient is a 26 year old female who is unemployed, lives with mother, brother and grand parents with a past psychiatric history of Bipolar disorder and intellectual disability with multiple past inpatient admissions that was BIBA for evaluation of possible suicidal ideation and AH     Patient was seen and evaluated and found to be calm and cooperative. Patient stating she has been getting more depressed, stating no one understands her and constantly find herself arguing with people and has been hearing voices telling her to choke herself. patient states she tried to ignore the voices and didn't tell her mother but it has become to overwhelming now. patient reports poor sleep, poor appetite and has thoughts to hurt herself by strangling herself. Patient denies any homicidal thoughts and denies any symtoms of heath or Visual hallucinations.     Collateral info taken from patients mother who states patient has history of past admission and suicidality but didn't realized she was worsening again until today when 911 was called.

## 2021-09-29 NOTE — ED PROVIDER NOTE - CLINICAL SUMMARY MEDICAL DECISION MAKING FREE TEXT BOX
27 y/o F presents for SI and auditory hallucinations presents for psych evaluation. Will medically clear for psych evaluation.

## 2021-09-29 NOTE — HISTORY OF PRESENT ILLNESS
[FreeTextEntry1] : 9/29/2021: JUNO SEGOVIA is a 26 year old female presenting for a follow-up evaluation of right foot and right ankle pain. The patient’s pain is noted to be the same as her last evaluation, with no improvement. She is here at the clinic to review MRI results. She has been utilizing ibuprofen with minimal relief. No other complaints. 	\par \par 8/26/2021: JUNO SEGOVIA is a 26 year old female who presents for follow-up evaluation of right foot, right ankle. She has been utilizing the short CAM boot. She has not obtained her MRI yet. She has been taking Advil with minimal relief. \par \par 8/19/21: The patient returns wearing a short cam boot on her right foot and right ankle.  The patient states that last Thursday she had increased pain in the right foot.  Since then she has burning pains in the right foot that has not subsided and has gotten worse.  She is weightbearing as tolerated with the short cam boot and placed in a short cam boot on last week due to her pain in the foot.  She had no trauma to the foot.  Her pain scale in the foot is a 7 out of 10.\par \par 7/29/21: JUNO SEGOVIA is a 26 year old female who presents for initial evaluation of bilateral ankle pain. This is her 4th opinion, was told by 2 DrPetr  that she needs surgery.

## 2021-09-30 ENCOUNTER — APPOINTMENT (OUTPATIENT)
Dept: PULMONOLOGY | Facility: CLINIC | Age: 26
End: 2021-09-30

## 2021-09-30 ENCOUNTER — EMERGENCY (EMERGENCY)
Facility: HOSPITAL | Age: 26
LOS: 1 days | Discharge: OTHER INSTITUTION | End: 2021-09-30
Attending: EMERGENCY MEDICINE
Payer: COMMERCIAL

## 2021-09-30 VITALS
HEIGHT: 60 IN | TEMPERATURE: 100 F | WEIGHT: 220.02 LBS | OXYGEN SATURATION: 98 % | SYSTOLIC BLOOD PRESSURE: 121 MMHG | DIASTOLIC BLOOD PRESSURE: 84 MMHG | HEART RATE: 101 BPM | RESPIRATION RATE: 22 BRPM

## 2021-09-30 VITALS
SYSTOLIC BLOOD PRESSURE: 110 MMHG | RESPIRATION RATE: 18 BRPM | HEART RATE: 96 BPM | TEMPERATURE: 98 F | DIASTOLIC BLOOD PRESSURE: 75 MMHG | OXYGEN SATURATION: 97 %

## 2021-09-30 DIAGNOSIS — Z98.890 OTHER SPECIFIED POSTPROCEDURAL STATES: Chronic | ICD-10-CM

## 2021-09-30 DIAGNOSIS — G93.2 BENIGN INTRACRANIAL HYPERTENSION: Chronic | ICD-10-CM

## 2021-09-30 LAB — SARS-COV-2 RNA SPEC QL NAA+PROBE: SIGNIFICANT CHANGE UP

## 2021-09-30 PROCEDURE — 80307 DRUG TEST PRSMV CHEM ANLYZR: CPT

## 2021-09-30 PROCEDURE — U0005: CPT

## 2021-09-30 PROCEDURE — 93005 ELECTROCARDIOGRAM TRACING: CPT

## 2021-09-30 PROCEDURE — 99285 EMERGENCY DEPT VISIT HI MDM: CPT

## 2021-09-30 PROCEDURE — 84702 CHORIONIC GONADOTROPIN TEST: CPT

## 2021-09-30 PROCEDURE — 99234 HOSP IP/OBS SM DT SF/LOW 45: CPT | Mod: 25

## 2021-09-30 PROCEDURE — 81001 URINALYSIS AUTO W/SCOPE: CPT

## 2021-09-30 PROCEDURE — 80053 COMPREHEN METABOLIC PANEL: CPT

## 2021-09-30 PROCEDURE — G0378: CPT

## 2021-09-30 PROCEDURE — 36415 COLL VENOUS BLD VENIPUNCTURE: CPT

## 2021-09-30 PROCEDURE — 85025 COMPLETE CBC W/AUTO DIFF WBC: CPT

## 2021-09-30 PROCEDURE — U0003: CPT

## 2021-09-30 PROCEDURE — 99218: CPT

## 2021-09-30 RX ORDER — ALPRAZOLAM 0.25 MG
2 TABLET ORAL EVERY 6 HOURS
Refills: 0 | Status: DISCONTINUED | OUTPATIENT
Start: 2021-09-30 | End: 2021-09-30

## 2021-09-30 RX ORDER — HYDROXYZINE HCL 10 MG
50 TABLET ORAL EVERY 6 HOURS
Refills: 0 | Status: DISCONTINUED | OUTPATIENT
Start: 2021-09-30 | End: 2021-10-04

## 2021-09-30 RX ORDER — OLANZAPINE 15 MG/1
5 TABLET, FILM COATED ORAL EVERY 6 HOURS
Refills: 0 | Status: DISCONTINUED | OUTPATIENT
Start: 2021-09-30 | End: 2021-10-05

## 2021-09-30 RX ORDER — ARIPIPRAZOLE 15 MG/1
10 TABLET ORAL DAILY
Refills: 0 | Status: DISCONTINUED | OUTPATIENT
Start: 2021-10-01 | End: 2021-10-05

## 2021-09-30 RX ORDER — ONDANSETRON 8 MG/1
4 TABLET, FILM COATED ORAL ONCE
Refills: 0 | Status: COMPLETED | OUTPATIENT
Start: 2021-09-30 | End: 2021-09-30

## 2021-09-30 RX ORDER — ALPRAZOLAM 0.25 MG
0.5 TABLET ORAL
Refills: 0 | Status: DISCONTINUED | OUTPATIENT
Start: 2021-09-30 | End: 2021-10-04

## 2021-09-30 RX ORDER — ESCITALOPRAM OXALATE 10 MG/1
15 TABLET, FILM COATED ORAL DAILY
Refills: 0 | Status: DISCONTINUED | OUTPATIENT
Start: 2021-10-01 | End: 2021-10-05

## 2021-09-30 RX ADMIN — ONDANSETRON 4 MILLIGRAM(S): 8 TABLET, FILM COATED ORAL at 12:31

## 2021-09-30 RX ADMIN — Medication 0.25 MILLIGRAM(S): at 04:19

## 2021-09-30 RX ADMIN — ESCITALOPRAM OXALATE 15 MILLIGRAM(S): 10 TABLET, FILM COATED ORAL at 12:31

## 2021-09-30 RX ADMIN — Medication 50 MILLIGRAM(S): at 09:59

## 2021-09-30 RX ADMIN — Medication 2 MILLIGRAM(S): at 12:31

## 2021-09-30 RX ADMIN — Medication 600 MILLIGRAM(S): at 08:46

## 2021-09-30 RX ADMIN — Medication 15 MILLIGRAM(S): at 06:08

## 2021-09-30 NOTE — ED BEHAVIORAL HEALTH ASSESSMENT NOTE - HPI (INCLUDE ILLNESS QUALITY, SEVERITY, DURATION, TIMING, CONTEXT, MODIFYING FACTORS, ASSOCIATED SIGNS AND SYMPTOMS)
Patient is a 26 year old female who is unemployed, lives with mother, brother and grand parents with a past psychiatric history of Bipolar disorder and intellectual disability with multiple past inpatient admissions that was BIBA for evaluation of possible suicidal ideation and AH     Patient initially evaluated on 9/29 and was to be transferred to [facility] but upon admission at [facility], was revealed that patient has CECILIA and uses a CPAP machine and patient was sent back   Patient seen for follow up and still endorsing depressed mood with suicidal thoughts and CAH to choke herself.     note from 9/29  Patient was seen and evaluated and found to be calm and cooperative. Patient stating she has been getting more depressed, stating no one understands her and constantly find herself arguing with people and has been hearing voices telling her to choke herself. patient states she tried to ignore the voices and didn't tell her mother but it has become to overwhelming now. patient reports poor sleep, poor appetite and has thoughts to hurt herself by strangling herself. Patient denies any homicidal thoughts and denies any symtoms of heath or Visual hallucinations.     Collateral info taken from patients mother who states patient has history of past admission and suicidality but didn't realized she was worsening again until today when 911 was called.

## 2021-09-30 NOTE — ED ADULT TRIAGE NOTE - CHIEF COMPLAINT QUOTE
returned from [facility] due to need for BiPAP at night, recently d/c from [facility] earlier today. inpatient Psych for auditory hallucinations. awake and alert in triage

## 2021-09-30 NOTE — ED CLERICAL - NS ED CLERK NOTE PRE-ARRIVAL INFORMATION; ADDITIONAL PRE-ARRIVAL INFORMATION
This patient is eligible for (or currently enrolled in) an outpatient care management program available through [facility]. This program can coordinate outpatient follow up and assist the patient in accessing a variety of outpatient resources.  If discharged from the ED, the patient will be contacted to see if any additional resources are needed.                                                                                    Please call the Nurse Clinical Call Center at [phone] with any questions or for assistance in discharge planning.

## 2021-09-30 NOTE — ED ADULT NURSE REASSESSMENT NOTE - NS ED NURSE REASSESS COMMENT FT1
awake requesting even dose of buspur stating she hasn't taken yet. pt medicated
awake still with ah. calm at this time. safety maintained no attempts at harm to self
pt becoming increasing anxious hitting self in head.  spoke with dr luther.  stefan ordered pt agreeable to im
pt c/o feeling anxious hearing voices medicated with xanax as ordered
pt nausea vomited zofran given motrin held at this time
pt vomited states she is getting her period and has cramps.  usually vomited. pt assisted in bathroom.  stating she feels better just has cramps .  dr costello made aware
spoke with pt stating feeling less anxious. states medicine is working
c/o ah feeling anxious mediated as ordere with xanax

## 2021-09-30 NOTE — ED PROVIDER NOTE - OBJECTIVE STATEMENT
27 yo female with hx anxiety, developmental delay, pseudotumor, was seen in ED yesterday  stating she has been getting more depressed, stating no one understands her and constantly find herself arguing with people and has been hearing voices telling her to choke herself. patient states she tried to ignore the voices. Patient describes thoughts to hurt herself by strangling herself.  patient was seen in [facility] and transferred to [facility] for inpatient psych  patient uses cpap at night, and that cannot be provided at [facility] so returned to ED

## 2021-09-30 NOTE — ED BEHAVIORAL HEALTH ASSESSMENT NOTE - DESCRIPTION
Vital Signs Last 24 Hrs  T(C): 37 (29 Sep 2021 19:30), Max: 37.2 (29 Sep 2021 18:26)  T(F): 98.6 (29 Sep 2021 19:30), Max: 98.9 (29 Sep 2021 18:26)  HR: 133 (29 Sep 2021 19:30) (129 - 133)  BP: 134/82 (29 Sep 2021 19:30) (134/82 - 143/103)  BP(mean): --  RR: 18 (29 Sep 2021 19:30) (18 - 20)  SpO2: 98% (29 Sep 2021 19:30) (98% - 98%) denies see HPI

## 2021-09-30 NOTE — ED BEHAVIORAL HEALTH ASSESSMENT NOTE - NSPRESENTSXS_PSY_ALL_CORE
I certify as stated above. Depressed mood/Anhedonia/Psychosis/Hopelessness or despair/Command hallucinations to hurt self

## 2021-09-30 NOTE — ED CDU PROVIDER INITIAL DAY NOTE - DETAILS
Patient in observation pending inpatient psychiatric bed availability for depression with suicidal ideation.

## 2021-09-30 NOTE — ED CDU PROVIDER INITIAL DAY NOTE - MEDICAL DECISION MAKING DETAILS
patient with depression SI  needs psych admission  was sent to [facility], but needs cpap at night  psych and SW  to work on placement

## 2021-09-30 NOTE — CHART NOTE - NSCHARTNOTEFT_GEN_A_CORE
ROSANNE Note: SW made aware by  provider pt is in need of inpt trx on involuntary basis. Pt with dx of intellectual disability.  staff alerted by pt's mother Kathy ([phone]) that pt has bad past experience at [facility] and [place], prefers pt to be trx to alternate facility. Covid result pending, presently no beds at [facility], [place] or [facility], SW following
SW Note: plan is to transfer pt for inpt psychiatric care. Covid neg. Referral made to [facility]. Pending MD review and  census

## 2021-09-30 NOTE — ED BEHAVIORAL HEALTH ASSESSMENT NOTE - SUMMARY
Patient is a 26 year old female who is unemployed, lives with mother, brother and grand parents with a past psychiatric history of Bipolar disorder and intellectual disability with multiple past inpatient admissions that was BIBA for evaluation of possible suicidal ideation and AH     Patient seen and evaluated and with worsening depression, and auditory hallucinations to hurt herself. Patient is a danger to self and is to be admitted under DOCS    Patient initially evaluated on 9/29 and was to be transferred to [facility] but upon admission at [facility], was revealed that patient has CECILIA and uses a CPAP machine and patient was sent back   Patient seen for follow up and still endorsing depressed mood with suicidal thoughts and CAH to choke herself.

## 2021-09-30 NOTE — ED CDU PROVIDER INITIAL DAY NOTE - OBJECTIVE STATEMENT
25 yo female with hx anxiety, developmental delay, pseudotumor, was seen in ED yesterday  stating she has been getting more depressed, stating no one understands her and constantly find herself arguing with people and has been hearing voices telling her to choke herself. patient states she tried to ignore the voices. Patient describes thoughts to hurt herself by strangling herself.  patient was seen in [facility] and transferred to [facility] for inpatient psych  patient uses cpap at night, and that cannot be provided at [facility] so returned to ED

## 2021-09-30 NOTE — ED ADULT TRIAGE NOTE - ARRIVAL FROM
DC sliding scale instructions      70   - 150  mg/dL =      0 Units  151 - 200  mg/dL =    3 Units  201 - 250  mg/dL =    4 Units  251 - 300  mg/dL  =   7 Units  301 - 350  mg/dL  =   10 Units  351 - 400 mg/dL   =   12 Units  Over 400 mg/dL   =   14 Units  Over 400 mg/dL14 Units and call primary MD   [facility]

## 2021-10-01 PROCEDURE — 99226: CPT

## 2021-10-01 RX ORDER — DIPHENHYDRAMINE HCL 50 MG
50 CAPSULE ORAL EVERY 6 HOURS
Refills: 0 | Status: DISCONTINUED | OUTPATIENT
Start: 2021-10-01 | End: 2021-10-05

## 2021-10-01 RX ORDER — HALOPERIDOL DECANOATE 100 MG/ML
5 INJECTION INTRAMUSCULAR EVERY 6 HOURS
Refills: 0 | Status: DISCONTINUED | OUTPATIENT
Start: 2021-10-01 | End: 2021-10-05

## 2021-10-01 RX ORDER — ARIPIPRAZOLE 15 MG/1
5 TABLET ORAL ONCE
Refills: 0 | Status: DISCONTINUED | OUTPATIENT
Start: 2021-10-01 | End: 2021-10-01

## 2021-10-01 RX ADMIN — Medication 50 MILLIGRAM(S): at 14:41

## 2021-10-01 RX ADMIN — Medication 2 MILLIGRAM(S): at 14:42

## 2021-10-01 RX ADMIN — HALOPERIDOL DECANOATE 5 MILLIGRAM(S): 100 INJECTION INTRAMUSCULAR at 08:35

## 2021-10-01 RX ADMIN — Medication 50 MILLIGRAM(S): at 23:36

## 2021-10-01 RX ADMIN — Medication 50 MILLIGRAM(S): at 08:35

## 2021-10-01 RX ADMIN — HALOPERIDOL DECANOATE 5 MILLIGRAM(S): 100 INJECTION INTRAMUSCULAR at 14:42

## 2021-10-01 RX ADMIN — HALOPERIDOL DECANOATE 5 MILLIGRAM(S): 100 INJECTION INTRAMUSCULAR at 23:37

## 2021-10-01 RX ADMIN — Medication 2 MILLIGRAM(S): at 08:35

## 2021-10-01 RX ADMIN — Medication 15 MILLIGRAM(S): at 18:13

## 2021-10-01 RX ADMIN — Medication 15 MILLIGRAM(S): at 06:42

## 2021-10-01 RX ADMIN — Medication 2 MILLIGRAM(S): at 23:36

## 2021-10-01 RX ADMIN — Medication 0.5 MILLIGRAM(S): at 20:21

## 2021-10-01 RX ADMIN — ARIPIPRAZOLE 10 MILLIGRAM(S): 15 TABLET ORAL at 08:03

## 2021-10-01 RX ADMIN — OLANZAPINE 5 MILLIGRAM(S): 15 TABLET, FILM COATED ORAL at 16:34

## 2021-10-01 RX ADMIN — OLANZAPINE 5 MILLIGRAM(S): 15 TABLET, FILM COATED ORAL at 05:31

## 2021-10-01 RX ADMIN — ESCITALOPRAM OXALATE 15 MILLIGRAM(S): 10 TABLET, FILM COATED ORAL at 08:03

## 2021-10-01 NOTE — ED ADULT NURSE NOTE - CHIEF COMPLAINT QUOTE
returned from [facility] due to need for BiPAP at night, recently d/c from [facility] earlier today. inpatient Psych for auditory hallucinations. awake and alert in triage

## 2021-10-01 NOTE — ED ADULT NURSE NOTE - OBJECTIVE STATEMENT
Entered on 10/01/21 at 1021:  Patient presented in ED on 9/30/21 via [facility] after patient presented at [facility] and was declined admission due to needing  sleep apnea machine.  Patient is awaiting a transfer to inpatient unit when bed available

## 2021-10-01 NOTE — ED ADULT NURSE NOTE - CAS ELECT INFOMATION PROVIDED
Pt given d/c instruction and instructed to follow up with prior appointment and medciation and return if worse/DC instructions

## 2021-10-01 NOTE — ED ADULT NURSE NOTE - MODE OF DISCHARGE
ENT HEARING EVALUATION    Name:  Yamile Cheney  :  1954  Age:  61 y o  Date of Evaluation: 21     History: ENT Audiogram / Bell's Palsy L side   Reason for visit: Yamile Cheney is being seen today at the request of Dr Tere Gil for an evaluation of hearing as part of their initial ENT visit  EVALUATION:    Otoscopic Evaluation:   Right Ear: Minimum cerumen    Left Ear: Minimum cerumen     Tympanometry:   Right: Type A - normal middle ear pressure and compliance   Left: Type A - normal middle ear pressure and compliance    Audiogram Results:  Mild to moderate HF SHL bilaterally with good word recognition scores, in quiet  *see attached audiogram    RECOMMENDATIONS:  1  Follow up per Dr Tere Gil  2  Consider annual hearing evaluations for monitoring purposes        Portia Frank , CCC-A, [place]# [id number], [employer], [place]# [id number]  Clinical Audiologist
Ambulatory

## 2021-10-02 LAB
ALBUMIN SERPL ELPH-MCNC: 4.1 G/DL — SIGNIFICANT CHANGE UP (ref 3.3–5.2)
ALP SERPL-CCNC: 76 U/L — SIGNIFICANT CHANGE UP (ref 40–120)
ALT FLD-CCNC: 17 U/L — SIGNIFICANT CHANGE UP
ANION GAP SERPL CALC-SCNC: 16 MMOL/L — SIGNIFICANT CHANGE UP (ref 5–17)
AST SERPL-CCNC: 23 U/L — SIGNIFICANT CHANGE UP
BILIRUB SERPL-MCNC: 0.5 MG/DL — SIGNIFICANT CHANGE UP (ref 0.4–2)
BUN SERPL-MCNC: 10.2 MG/DL — SIGNIFICANT CHANGE UP (ref 8–20)
CALCIUM SERPL-MCNC: 9.5 MG/DL — SIGNIFICANT CHANGE UP (ref 8.6–10.2)
CHLORIDE SERPL-SCNC: 102 MMOL/L — SIGNIFICANT CHANGE UP (ref 98–107)
CO2 SERPL-SCNC: 21 MMOL/L — LOW (ref 22–29)
CREAT SERPL-MCNC: 0.83 MG/DL — SIGNIFICANT CHANGE UP (ref 0.5–1.3)
GLUCOSE SERPL-MCNC: 97 MG/DL — SIGNIFICANT CHANGE UP (ref 70–99)
HCT VFR BLD CALC: 41.4 % — SIGNIFICANT CHANGE UP (ref 34.5–45)
HGB BLD-MCNC: 13.1 G/DL — SIGNIFICANT CHANGE UP (ref 11.5–15.5)
MCHC RBC-ENTMCNC: 25.2 PG — LOW (ref 27–34)
MCHC RBC-ENTMCNC: 31.6 GM/DL — LOW (ref 32–36)
MCV RBC AUTO: 79.6 FL — LOW (ref 80–100)
PLATELET # BLD AUTO: 345 K/UL — SIGNIFICANT CHANGE UP (ref 150–400)
POTASSIUM SERPL-MCNC: 4 MMOL/L — SIGNIFICANT CHANGE UP (ref 3.5–5.3)
POTASSIUM SERPL-SCNC: 4 MMOL/L — SIGNIFICANT CHANGE UP (ref 3.5–5.3)
PROT SERPL-MCNC: 7.5 G/DL — SIGNIFICANT CHANGE UP (ref 6.6–8.7)
RBC # BLD: 5.2 M/UL — SIGNIFICANT CHANGE UP (ref 3.8–5.2)
RBC # FLD: 15.9 % — HIGH (ref 10.3–14.5)
SARS-COV-2 RNA SPEC QL NAA+PROBE: SIGNIFICANT CHANGE UP
SODIUM SERPL-SCNC: 139 MMOL/L — SIGNIFICANT CHANGE UP (ref 135–145)
WBC # BLD: 9.32 K/UL — SIGNIFICANT CHANGE UP (ref 3.8–10.5)
WBC # FLD AUTO: 9.32 K/UL — SIGNIFICANT CHANGE UP (ref 3.8–10.5)

## 2021-10-02 PROCEDURE — 99226: CPT

## 2021-10-02 PROCEDURE — 93010 ELECTROCARDIOGRAM REPORT: CPT

## 2021-10-02 RX ADMIN — Medication 15 MILLIGRAM(S): at 06:48

## 2021-10-02 RX ADMIN — Medication 2 MILLIGRAM(S): at 14:12

## 2021-10-02 RX ADMIN — Medication 50 MILLIGRAM(S): at 14:11

## 2021-10-02 RX ADMIN — HALOPERIDOL DECANOATE 5 MILLIGRAM(S): 100 INJECTION INTRAMUSCULAR at 14:11

## 2021-10-02 RX ADMIN — ARIPIPRAZOLE 10 MILLIGRAM(S): 15 TABLET ORAL at 06:48

## 2021-10-02 RX ADMIN — Medication 15 MILLIGRAM(S): at 18:05

## 2021-10-02 RX ADMIN — ESCITALOPRAM OXALATE 10 MILLIGRAM(S): 10 TABLET, FILM COATED ORAL at 06:49

## 2021-10-02 NOTE — ED BEHAVIORAL HEALTH NOTE - BEHAVIORAL HEALTH NOTE
Pt seen for re-evaluation. She continues to endorse suicidal ideations and command hallucinations to hurt herself. Pt is in need for inpatient hospital for stabilization.

## 2021-10-03 PROCEDURE — 99226: CPT

## 2021-10-03 RX ADMIN — HALOPERIDOL DECANOATE 5 MILLIGRAM(S): 100 INJECTION INTRAMUSCULAR at 19:47

## 2021-10-03 RX ADMIN — Medication 0.5 MILLIGRAM(S): at 12:19

## 2021-10-03 RX ADMIN — Medication 2 MILLIGRAM(S): at 13:00

## 2021-10-03 RX ADMIN — Medication 15 MILLIGRAM(S): at 06:28

## 2021-10-03 RX ADMIN — HALOPERIDOL DECANOATE 5 MILLIGRAM(S): 100 INJECTION INTRAMUSCULAR at 13:00

## 2021-10-03 RX ADMIN — ARIPIPRAZOLE 10 MILLIGRAM(S): 15 TABLET ORAL at 06:28

## 2021-10-03 RX ADMIN — Medication 2 MILLIGRAM(S): at 19:48

## 2021-10-03 RX ADMIN — Medication 15 MILLIGRAM(S): at 18:36

## 2021-10-03 RX ADMIN — ESCITALOPRAM OXALATE 15 MILLIGRAM(S): 10 TABLET, FILM COATED ORAL at 06:29

## 2021-10-03 RX ADMIN — Medication 50 MILLIGRAM(S): at 19:47

## 2021-10-03 RX ADMIN — Medication 50 MILLIGRAM(S): at 13:00

## 2021-10-04 LAB — SARS-COV-2 RNA SPEC QL NAA+PROBE: SIGNIFICANT CHANGE UP

## 2021-10-04 PROCEDURE — 99226: CPT

## 2021-10-04 PROCEDURE — 99213 OFFICE O/P EST LOW 20 MIN: CPT

## 2021-10-04 RX ADMIN — OLANZAPINE 5 MILLIGRAM(S): 15 TABLET, FILM COATED ORAL at 19:23

## 2021-10-04 RX ADMIN — Medication 15 MILLIGRAM(S): at 06:30

## 2021-10-04 RX ADMIN — ESCITALOPRAM OXALATE 15 MILLIGRAM(S): 10 TABLET, FILM COATED ORAL at 06:30

## 2021-10-04 RX ADMIN — Medication 50 MILLIGRAM(S): at 12:50

## 2021-10-04 RX ADMIN — ARIPIPRAZOLE 10 MILLIGRAM(S): 15 TABLET ORAL at 06:30

## 2021-10-04 RX ADMIN — Medication 2 MILLIGRAM(S): at 12:50

## 2021-10-04 RX ADMIN — HALOPERIDOL DECANOATE 5 MILLIGRAM(S): 100 INJECTION INTRAMUSCULAR at 12:50

## 2021-10-04 RX ADMIN — Medication 0.5 MILLIGRAM(S): at 17:15

## 2021-10-04 RX ADMIN — Medication 15 MILLIGRAM(S): at 17:20

## 2021-10-04 NOTE — ED BEHAVIORAL HEALTH NOTE - BEHAVIORAL HEALTH NOTE
On reevaluation patient was laying down on her bed. Admits to constantly hearing a woman's voice demanding her to hurt herself and others. States she currently feels depressed, anxious and having SI/HI. Reports she is able to sleep well and has an appetite. Patient is aware her CECILIA and CPAP machine prevented her from being admitted at [facility]. Awaiting for a bed to be available in which accommodates the CPAP machine.

## 2021-10-04 NOTE — ED BEHAVIORAL HEALTH NOTE - BEHAVIORAL HEALTH NOTE
PROGRESS NOTE: 10-04-21 @ 08:57  	  • Reason for Ongoing Consultation: 	Auditory Hallucination, suicidal ideations     ID: 26yyo Female with HEALTH ISSUES - PROBLEM Dx: CECILIA, developmental delay           INTERVAL DATA:   • Interval Chief Complaint: "Not so great."  • Interval History:  27 yo F, unemployed, residing with mother, brother and grandparents with a PPH of bipolar, depression, intellectual/developmental disability presents with suicidal ideation and AH. On reevaluation patient admits to constantly hearing a woman's voice demanding her to hurt herself and others. States she currently feels depressed, anxious and having SI/HI. Patient has been seen banging her head on the wall and slapping herself. Reports she is able to sleep well and has a normal appetite.     REVIEW OF SYSTEMS:   • Constitutional Symptoms	No complaints  • Eyes	No complaints  • Ears / Nose / Throat / Mouth	No complaints  • Cardiovascular	No complaints  • Respiratory	No complaints  • Gastrointestinal	No complaints  • Genitourinary	No complaints  • Musculoskeletal	No complaints  • Skin	No complaints  • Neurological	No complaints  • Psychiatric (see HPI)	See HPI  • Endocrine	No complaints  • Hematologic / Lymphatic	No complaints  • Allergic / Immunologic	No complaints    REVIEW OF VITALS/LABS/IMAGING/INVESTIGATIONS:   • Vital signs reviewed: Yes  • Vital Signs:	    T(C): 36.7 (10-04-21 @ 03:32), Max: 37.1 (10-03-21 @ 15:40)  HR: 90 (10-04-21 @ 03:32) (90 - 114)  BP: 99/60 (10-04-21 @ 03:32) (99/60 - 136/85)  RR: 18 (10-04-21 @ 03:32) (18 - 20)  SpO2: 96% (10-04-21 @ 03:32) (96% - 98%)    • Available labs reviewed: Yes  • Available Lab Results:                     MEDICATIONS:      PRN Medications:  • PRN Medications since last evaluation	  • PRN Details	    Current Medications:   ALPRAZolam 0.5 milliGRAM(s) Oral four times a day PRN  ARIPiprazole 10 milliGRAM(s) Oral daily  busPIRone 15 milliGRAM(s) Oral two times a day  diphenhydrAMINE   Injectable 50 milliGRAM(s) IntraMuscular every 6 hours PRN  escitalopram 15 milliGRAM(s) Oral daily  haloperidol    Injectable 5 milliGRAM(s) IntraMuscular every 6 hours PRN  LORazepam   Injectable 2 milliGRAM(s) IntraMuscular every 6 hours PRN  OLANZapine Injectable 5 milliGRAM(s) IntraMuscular every 6 hours PRN     Medication Side Effects:  • Medication Side Effects or Adverse Reactions (new or ongoing)	None known    MENTAL STATUS EXAM:   • Level of Consciousness	Alert  • General Appearance	No deformities present   • Body Habitus	Overweight  • Hygiene	Fair  • Grooming	Fair  • Behavior	Cooperative  • Eye Contact	Fair  • Relatedness	Fair  • Impulse Control	Impaired  • Muscle Tone / Strength	Normal muscle tone/strength  • Abnormal Movements	No abnormal movements  • Gait / Station	No assessed  • Speech Volume	Normal  • Speech Rate	Normal  • Speech Spontaneity	Normal  • Speech Articulation	Normal  • Mood	Depressed  • Affect Quality	Depressed  • Affect Range	Constricted  • Affect Congruence	Congruent  • Thought Process	Linear  • Thought Associations	Normal  • Thought Content	Hopelessness, Suicidality, Homicidally    • Perceptions	Auditory Hallucinations  • Oriented to Time	Yes  • Oriented to Place	Yes  • Oriented to Situation	Yes  • Oriented to Person	Yes  • Attention / Concentration	Normal  • Estimated Intelligence	Average  • Recent Memory	Normal  • Remote Memory	Normal  • Fund of Knowledge	Normal  • Language	No abnormalities noted  • Judgment (regarding everyday events)	Poor  • Insight (regarding psychiatric illness)	Poor    SUICIDALITY:   • Suicidality (Interval)	Reports currently having suicidal thoughts    HOMICIDALITY/AGGRESSION:   • Homicidality/Aggression	Reports currently having homicidal thoughts     DIAGNOSIS DSM-V:    Psychiatric Diagnosis (Corresponds to DSM-IV Axis I, II): Bipolar, Depression    HEALTH ISSUES - PROBLEM Dx: CECILIA           Medical Diagnosis (Corresponds to DSM-IV Axis III):CECILIA, developmental delay, left parotid hypertrophy, pseudotumor cerebri  • Axis III	      ASSESSMENT OF CURRENT CONDITION:   Summary (include case differential, formulation and patient response to therapy): On assessment patient was cooperative. Continues to experience auditory hallucination, suicidal and homicidal thoughts. She is aware her CECILIA and CPAP machine prevented her from being admitted at [facility]. Awaiting for a bed to be available in which accommodates the CPAP machine.     Risk Assessment (consider static vs modifiable risk factors and protective factors; comment on level of risk for dangerous behavior): high risk     PLAN: Waiting for bed to become available for inpatient transfer. Contiue giving patient her medications while at [facility] PROGRESS NOTE: 10-04-21 @ 08:57  	  • Reason for Ongoing Consultation: 	Auditory Hallucination, suicidal ideations     ID: 26yyo Female with HEALTH ISSUES - PROBLEM Dx: CECILIA, developmental delay           INTERVAL DATA:   • Interval Chief Complaint: "Not so great."  • Interval History:  25 yo F, unemployed, residing with mother, brother and grandparents with a PPH of bipolar, depression, intellectual/developmental disability presents with suicidal ideation and AH. On reevaluation patient admits to constantly hearing a woman's voice demanding her to hurt herself and others. States she currently feels depressed, anxious and having SI/HI. Reports she is able to sleep well and has a normal appetite.     REVIEW OF SYSTEMS:   • Constitutional Symptoms	No complaints  • Eyes	No complaints  • Ears / Nose / Throat / Mouth	No complaints  • Cardiovascular	No complaints  • Respiratory	No complaints  • Gastrointestinal	No complaints  • Genitourinary	No complaints  • Musculoskeletal	No complaints  • Skin	No complaints  • Neurological	No complaints  • Psychiatric (see HPI)	See HPI  • Endocrine	No complaints  • Hematologic / Lymphatic	No complaints  • Allergic / Immunologic	No complaints    REVIEW OF VITALS/LABS/IMAGING/INVESTIGATIONS:   • Vital signs reviewed: Yes  • Vital Signs:	    T(C): 36.7 (10-04-21 @ 03:32), Max: 37.1 (10-03-21 @ 15:40)  HR: 90 (10-04-21 @ 03:32) (90 - 114)  BP: 99/60 (10-04-21 @ 03:32) (99/60 - 136/85)  RR: 18 (10-04-21 @ 03:32) (18 - 20)  SpO2: 96% (10-04-21 @ 03:32) (96% - 98%)    • Available labs reviewed: Yes  • Available Lab Results:               MEDICATIONS:      PRN Medications:  • PRN Medications since last evaluation	  • PRN Details	    Current Medications:   ALPRAZolam 0.5 milliGRAM(s) Oral four times a day PRN  ARIPiprazole 10 milliGRAM(s) Oral daily  busPIRone 15 milliGRAM(s) Oral two times a day  diphenhydrAMINE   Injectable 50 milliGRAM(s) IntraMuscular every 6 hours PRN  escitalopram 15 milliGRAM(s) Oral daily  haloperidol    Injectable 5 milliGRAM(s) IntraMuscular every 6 hours PRN  LORazepam   Injectable 2 milliGRAM(s) IntraMuscular every 6 hours PRN  OLANZapine Injectable 5 milliGRAM(s) IntraMuscular every 6 hours PRN     Medication Side Effects:  • Medication Side Effects or Adverse Reactions (new or ongoing)	None known    MENTAL STATUS EXAM:   • Level of Consciousness	Alert  • General Appearance	No deformities present   • Body Habitus	Overweight  • Hygiene	Fair  • Grooming	Fair  • Behavior	Cooperative  • Eye Contact	Fair  • Relatedness	Fair  • Impulse Control	Impaired  • Muscle Tone / Strength	Normal muscle tone/strength  • Abnormal Movements	No abnormal movements  • Gait / Station	No assessed  • Speech Volume	Normal  • Speech Rate	Normal  • Speech Spontaneity	Normal  • Speech Articulation	Normal  • Mood	Depressed  • Affect Quality	Depressed  • Affect Range	Constricted  • Affect Congruence	Congruent  • Thought Process	Linear  • Thought Associations	Normal  • Thought Content	Hopelessness, Suicidality, Homicidally    • Perceptions	Auditory Hallucinations  • Oriented to Time	Yes  • Oriented to Place	Yes  • Oriented to Situation	Yes  • Oriented to Person	Yes  • Attention / Concentration	Normal  • Estimated Intelligence	Average  • Recent Memory	Normal  • Remote Memory	Normal  • Fund of Knowledge	Normal  • Language	No abnormalities noted  • Judgment (regarding everyday events)	Poor  • Insight (regarding psychiatric illness)	Poor    SUICIDALITY:   • Suicidality (Interval)	Reports currently having suicidal thoughts    HOMICIDALITY/AGGRESSION:   • Homicidality/Aggression	Reports currently having homicidal thoughts     DIAGNOSIS DSM-V:    Psychiatric Diagnosis (Corresponds to DSM-IV Axis I, II): Bipolar, Depression    HEALTH ISSUES - PROBLEM Dx: CECILIA           Medical Diagnosis (Corresponds to DSM-IV Axis III):CECILIA, developmental delay, left parotid hypertrophy, pseudotumor cerebri  • Axis III	      ASSESSMENT OF CURRENT CONDITION:   Summary (include case differential, formulation and patient response to therapy): On assessment patient was cooperative. Continues to experience auditory hallucination, suicidal and homicidal thoughts. She is aware her CECILIA and CPAP machine prevented her from being admitted at [facility]. Awaiting for a bed to be available in which accommodates the CPAP machine.     Risk Assessment (consider static vs modifiable risk factors and protective factors; comment on level of risk for dangerous behavior): high risk     PLAN: Waiting for bed to become available for inpatient transfer. Contiue giving patient her medications while at [facility] PROGRESS NOTE: 10-04-21 @ 08:57  	  • Reason for Ongoing Consultation: 	Auditory Hallucination, suicidal ideations     ID: 26yyo Female with HEALTH ISSUES - PROBLEM Dx: CECILIA, developmental delay           INTERVAL DATA:   • Interval Chief Complaint: "Not so great."  • Interval History:  27 yo F, unemployed, residing with mother, brother and grandparents with a PPH of bipolar, depression, intellectual/developmental disability presents with suicidal ideation and AH. On reevaluation patient admits to constantly hearing a woman's voice demanding her to hurt herself and others. States she currently feels depressed, anxious and having SI/HI. Reports she is able to sleep well and has a normal appetite.     REVIEW OF SYSTEMS:   • Constitutional Symptoms	No complaints  • Eyes	No complaints  • Ears / Nose / Throat / Mouth	No complaints  • Cardiovascular	No complaints  • Respiratory	No complaints  • Gastrointestinal	No complaints  • Genitourinary	No complaints  • Musculoskeletal	No complaints  • Skin	No complaints  • Neurological	No complaints  • Psychiatric (see HPI)	See HPI  • Endocrine	No complaints  • Hematologic / Lymphatic	No complaints  • Allergic / Immunologic	No complaints    REVIEW OF VITALS/LABS/IMAGING/INVESTIGATIONS:   • Vital signs reviewed: Yes  • Vital Signs:	    T(C): 36.7 (10-04-21 @ 03:32), Max: 37.1 (10-03-21 @ 15:40)  HR: 90 (10-04-21 @ 03:32) (90 - 114)  BP: 99/60 (10-04-21 @ 03:32) (99/60 - 136/85)  RR: 18 (10-04-21 @ 03:32) (18 - 20)  SpO2: 96% (10-04-21 @ 03:32) (96% - 98%)    • Available labs reviewed: Yes  • Available Lab Results:               MEDICATIONS:      PRN Medications:  • PRN Medications since last evaluation	  • PRN Details	    Current Medications:   ALPRAZolam 0.5 milliGRAM(s) Oral four times a day PRN  ARIPiprazole 10 milliGRAM(s) Oral daily  busPIRone 15 milliGRAM(s) Oral two times a day  diphenhydrAMINE   Injectable 50 milliGRAM(s) IntraMuscular every 6 hours PRN  escitalopram 15 milliGRAM(s) Oral daily  haloperidol    Injectable 5 milliGRAM(s) IntraMuscular every 6 hours PRN  LORazepam   Injectable 2 milliGRAM(s) IntraMuscular every 6 hours PRN  OLANZapine Injectable 5 milliGRAM(s) IntraMuscular every 6 hours PRN     Medication Side Effects:  • Medication Side Effects or Adverse Reactions (new or ongoing)	None known    MENTAL STATUS EXAM:   • Level of Consciousness	Alert  • General Appearance	No deformities present   • Body Habitus	Overweight  • Hygiene	Fair  • Grooming	Fair  • Behavior	Cooperative  • Eye Contact	Fair  • Relatedness	Fair  • Impulse Control	Impaired  • Muscle Tone / Strength	Normal muscle tone/strength  • Abnormal Movements	No abnormal movements  • Gait / Station	No assessed  • Speech Volume	Normal  • Speech Rate	Normal  • Speech Spontaneity	Normal  • Speech Articulation	Normal  • Mood	Depressed  • Affect Quality	Depressed  • Affect Range	Constricted  • Affect Congruence	Congruent  • Thought Process	Linear  • Thought Associations	Normal  • Thought Content	Hopelessness, Suicidality, Homicidally    • Perceptions	Auditory Hallucinations  • Oriented to Time	Yes  • Oriented to Place	Yes  • Oriented to Situation	Yes  • Oriented to Person	Yes  • Attention / Concentration	Normal  • Estimated Intelligence	Average  • Recent Memory	Normal  • Remote Memory	Normal  • Fund of Knowledge	Normal  • Language	No abnormalities noted  • Judgment (regarding everyday events)	Poor  • Insight (regarding psychiatric illness)	Poor    SUICIDALITY:   • Suicidality (Interval)	Reports currently having suicidal thoughts    HOMICIDALITY/AGGRESSION:   • Homicidality/Aggression	Reports currently having homicidal thoughts     DIAGNOSIS DSM-V:    Psychiatric Diagnosis (Corresponds to DSM-IV Axis I, II): Bipolar, Depression    HEALTH ISSUES - PROBLEM Dx: CECILIA           Medical Diagnosis (Corresponds to DSM-IV Axis III):CECILIA, developmental delay, left parotid hypertrophy, pseudotumor cerebri  • Axis III	      ASSESSMENT OF CURRENT CONDITION:   Summary (include case differential, formulation and patient response to therapy): On assessment patient was cooperative. Continues to experience auditory hallucination, suicidal and homicidal thoughts. She is aware her CECILIA and CPAP machine prevented her from being admitted at [facility]. Awaiting for a bed to be available in which accommodates the CPAP machine.     Risk Assessment (consider static vs modifiable risk factors and protective factors; comment on level of risk for dangerous behavior): high risk     PLAN: Waiting for bed to become available for inpatient transfer. Continue giving patient her medications while at [facility]

## 2021-10-05 ENCOUNTER — INPATIENT (INPATIENT)
Facility: HOSPITAL | Age: 26
LOS: 7 days | Discharge: HOME | End: 2021-10-13
Attending: PSYCHIATRY & NEUROLOGY | Admitting: PSYCHIATRY & NEUROLOGY
Payer: MEDICAID

## 2021-10-05 VITALS
SYSTOLIC BLOOD PRESSURE: 134 MMHG | OXYGEN SATURATION: 98 % | RESPIRATION RATE: 20 BRPM | DIASTOLIC BLOOD PRESSURE: 80 MMHG | TEMPERATURE: 98 F | HEART RATE: 100 BPM

## 2021-10-05 DIAGNOSIS — G93.2 BENIGN INTRACRANIAL HYPERTENSION: Chronic | ICD-10-CM

## 2021-10-05 DIAGNOSIS — Z98.890 OTHER SPECIFIED POSTPROCEDURAL STATES: Chronic | ICD-10-CM

## 2021-10-05 PROCEDURE — 96372 THER/PROPH/DIAG INJ SC/IM: CPT

## 2021-10-05 PROCEDURE — U0005: CPT

## 2021-10-05 PROCEDURE — 36415 COLL VENOUS BLD VENIPUNCTURE: CPT

## 2021-10-05 PROCEDURE — 85027 COMPLETE CBC AUTOMATED: CPT

## 2021-10-05 PROCEDURE — 99217: CPT

## 2021-10-05 PROCEDURE — 80053 COMPREHEN METABOLIC PANEL: CPT

## 2021-10-05 PROCEDURE — U0003: CPT

## 2021-10-05 PROCEDURE — 93005 ELECTROCARDIOGRAM TRACING: CPT

## 2021-10-05 PROCEDURE — 99285 EMERGENCY DEPT VISIT HI MDM: CPT | Mod: 25

## 2021-10-05 PROCEDURE — G0378: CPT

## 2021-10-05 RX ORDER — HALOPERIDOL DECANOATE 100 MG/ML
5 INJECTION INTRAMUSCULAR EVERY 6 HOURS
Refills: 0 | Status: DISCONTINUED | OUTPATIENT
Start: 2021-10-05 | End: 2021-10-06

## 2021-10-05 RX ORDER — ALPRAZOLAM 0.25 MG
0.25 TABLET ORAL EVERY 12 HOURS
Refills: 0 | Status: DISCONTINUED | OUTPATIENT
Start: 2021-10-05 | End: 2021-10-06

## 2021-10-05 RX ORDER — ARIPIPRAZOLE 15 MG/1
10 TABLET ORAL DAILY
Refills: 0 | Status: DISCONTINUED | OUTPATIENT
Start: 2021-10-05 | End: 2021-10-08

## 2021-10-05 RX ORDER — ESCITALOPRAM OXALATE 10 MG/1
15 TABLET, FILM COATED ORAL DAILY
Refills: 0 | Status: DISCONTINUED | OUTPATIENT
Start: 2021-10-05 | End: 2021-10-13

## 2021-10-05 RX ORDER — DIPHENHYDRAMINE HCL 50 MG
50 CAPSULE ORAL EVERY 6 HOURS
Refills: 0 | Status: DISCONTINUED | OUTPATIENT
Start: 2021-10-05 | End: 2021-10-13

## 2021-10-05 RX ADMIN — Medication 50 MILLIGRAM(S): at 20:34

## 2021-10-05 RX ADMIN — HALOPERIDOL DECANOATE 5 MILLIGRAM(S): 100 INJECTION INTRAMUSCULAR at 20:34

## 2021-10-05 RX ADMIN — ESCITALOPRAM OXALATE 15 MILLIGRAM(S): 10 TABLET, FILM COATED ORAL at 06:20

## 2021-10-05 RX ADMIN — Medication 15 MILLIGRAM(S): at 06:18

## 2021-10-05 RX ADMIN — ARIPIPRAZOLE 10 MILLIGRAM(S): 15 TABLET ORAL at 06:19

## 2021-10-05 RX ADMIN — Medication 15 MILLIGRAM(S): at 21:10

## 2021-10-05 NOTE — ED CDU PROVIDER SUBSEQUENT DAY NOTE - MEDICAL DECISION MAKING DETAILS
pt pending inpatient psychiatric placement.
pt pending psych disposition
pt pending inpatient psychiatric placement.
in [facility] awaitring inpt bed
patient with depression SI  needs psych admission  was sent to [facility], but needs cpap at night  psych and SW  to work on placement

## 2021-10-05 NOTE — ED CDU PROVIDER SUBSEQUENT DAY NOTE - NSICDXNOFAMILYHX_GEN_ALL_ED
<-- Click to add NO pertinent Family History

## 2021-10-05 NOTE — ED ADULT NURSE REASSESSMENT NOTE - COMFORT CARE
darkened lights
meal provided/plan of care explained
meal provided/plan of care explained/po fluids offered
meal provided/plan of care explained
meal provided/plan of care explained
darkened lights
meal provided/plan of care explained
plan of care explained
meal provided/plan of care explained

## 2021-10-05 NOTE — ED CDU PROVIDER SUBSEQUENT DAY NOTE - SOCIAL CONCERNS
Complex psychosocial needs/coping issues

## 2021-10-05 NOTE — CHART NOTE - NSCHARTNOTEFT_GEN_A_CORE
Patient is a 27 yo F with hx of ASD, ? Bipolar disorder, REESE who is admitted from [facility] ED for worsening depression and CAH. Legals reviewed. No H&P received.   Pt was seen bedside. She denies any active SI but endorses CAH. She is able to contract for safety and agrees to let staff know if CAH are worsening. Meds confirmed with pharmacy. However changes made at ED are unclear. Will restart her home medications. Labs ordered. Pt will be on constant observation while she uses CPAP at night.   Meds :  - Buspirone 15 mg BID  - Abilify 10 mg QD  - Lexapro 15 mg QD  - Haldol 5 mg Q6 PO PRN and Benadryl 50 mg Q6 PO PRN for acute agitation/ aggression/ psychosis.

## 2021-10-05 NOTE — ED ADULT NURSE REASSESSMENT NOTE - GENERAL PATIENT STATE
anxious
comfortable appearance/cooperative
cooperative
resting/sleeping
comfortable appearance/cooperative
comfortable appearance/cooperative
resting/sleeping
anxious/crying
resting/sleeping
resting/sleeping
comfortable appearance/cooperative

## 2021-10-05 NOTE — ED ADULT NURSE REASSESSMENT NOTE - NS ED NURSE REASSESS COMMENT FT1
Assumed care of patient.  Pt crying and states wanting to leave  Pt Spoke with Dr. Candelario and plan made.  Pt medicated with Xanax  and patient calmer and remaining in room  Will monitor and chart changes and maintain safe environment
Assumed care of patient. Pt states feeling better today has been eating slept well last night and showered today.  Pt endorses still having voices not bad at present monument.  Pt requesting to use phone.  Will monitor and chart changes
Dr. Amin notified of patients pulse.  Orders obtained for EKG and labs.  Patient educated about ordered testing and agrees.  Safety maintained.
Janet from [facility] confirms patient will be accepted today to [address] [place] [address] once patient discharged and will call  for report when ready to accept.  Patient educated about pending transfer and agrees with plan.  No attempts to harm self or others.  Patient ate 50% of her breakfast.  No attempts to harm self or others and safety maintained.
Patient ate two packs of cookies and drinking water.  Patient refusing meals offered at bedside.  Patient contacting her mother via telephone intermittently.  Not currently attempting to harm herself and safety maintained.
Patient calmer post medication intervention.  Resting in bed awake with no distress.  Denies suicidal ideation and no attempts to harm self or others.  Safety maintained.
Pt screaming out and banging head on wall.  Pt medicated with Haldol, Ativan and Benadryl as ordered.
pt appears agitated with sib hitting herself on the head with her hands. re-direction attempted with little success. pt endorses medication to help alleviate agitation. pt tolerated well.
pt continues to sleeping with NAD noted.
pt informed of available bed at [facility] and endorses transfer.
pt minimally responding to medication for agitation, awake and alert with multiple complaints stating she is hearing voices telling her to harm herself. pt's area screened for safety and supported with enhanced supervision to maintain pt safety. redirection utilized with some success.
pt was restless and agitated yelling at times and c/o auditory hallucinations. pt offered medication Zyprexa 5mg IM and endorsed administration. pt s/p is sleeping with NAD observed.
urine and covid swab obtained and sent to lab
Assumed care of patient at 0720.  Patient resting in bed asleep with no distress and regular nonlabored breathing.  Safety maintained.
Assumed care of patient at 0720.  Patient sitting on floor crying.  Patient oriented to staff and educated about plan of care.  Patient provided breakfast but declined to eat.  Patient was compliant with offered medications.  Patient become increasingly agitated crying loudly and attempting to bang her head against the wall and gesturing to choke her self stating "I want to kill myself".  Dr. Romero notified of behavior and medication intervention ordered.  Patient offered and accepted Haldol 5mg IM, Ativan 2mg IM in right gluteal muscle and Benadryl 50mg IM at 0835 for agitation.  Patient ceased self injurious behavior post medication intervention and agreed to rest in bed.  Safety of patient maintained.
Patient became upset this afternoon when another pt was yelling on the unit. States she was having suicidal thoughts to hang herself. Staff offered medication and pt agreed to receiving IM Haldol 5mg, Ativan 2mg, and Benadryl 50mg to L deltoid at 1300. Patient more calm and able to rest after receiving medication. Denied any suicidal ideation at this time. Speaking to mother on phone. Denied current a/v hallucinations.
Patient ate 50% of dinner.  Patient complaint with medications.  No attempts to harm self or others and safety maintained.  Patient verbalized feelings of frustration with waiting for transfer to inpatient psychiatric unit but agrees with plan for transfer when a bed is available.
Patient ate 75% of her lunch.  Patient become acutely agitated and admitted she wanted to kill herself.  Offered and accepted medication intervention to help control suicidal urges.  Patient received Ativan 2mg, Haldol 5mg, and Benadryl 50mg IM at 1411.  Patient calmer post medication intervention.  Safety maintained and ceased self injurious behavior.
Assumed care of patient at 0710.  Patient awake sitting in her room on bed.  Patient oriented to staff and educated about plan of care.  No attempts to harm self or others and safety maintained.
Patient provided lunch at bedside.  Patient educated about plan of care and agrees to transfer to [facility].  No attempts to harm self or others and safety maintained.  Nurse to nurse report called to Patricia RMOERO at [address] [place] [address].
Patient provided meal trays at bedside.  Patient reported she had feels of suicide but did not act on them after talking with staff.  Patient denies suicidal or homicidal ideations.
ASssumed care of patient. Pt states that she is hearing voices to harm self and requesting to speak with her mother phone provided and appears calmer since.  Pt's mother sent in bi-pap machine from home and place with patient.s belonging.  Will monitor and chart changes and maintain safe environment
Patient provided breakfast at bedside.  Patient verbalized self injurious thoughts stating "I'm going to kill myself" and made gesture to choke herself.  Patient ceased behavior during verbal interaction with staff.  Patient verbally contracted not to harm her self and has maintained safe behavior.  Dr. Candelario notified of patient behavior.  Safety maintained.
Assumed care of patient at 0720.  Patient resting in bed with no distress, noted to be sleeping with regular non labored breathing.  Safety of patient maintained.
Patient became increasingly agitated yelling in her room and when staff entered attempted to bang her head against the wall but ceased during interaction.  Dr. Trivedi notified of patient behavior and patient offered and accepted Zyprexa 5mg IM at 1634 with good effect.  Patient ceased self injurious behavior.  Patient called her mother on the telephone.  Patient provided coloring supplies as she reports she enjoys doing this activity.  Safety of patient maintained.
Patient offered lunch but declined meal. Patient began exhibiting increasing psychomotor agitation and admitted to urges to "kill myself".  Patient placing her hands by her neck but not applying any pressure.  Patient ceased behavior when engaged verbally.  Patient unable to identify any triggers and requesting medication intervention to help her maintain her safety.  Patient received Ativan 2mg IM and Haldol 5mg IM in left deltoid and Benadryl 50mg in right deltoid at 1441.  Patient resting in bed post medication interaction.  Safety of patient maintained.

## 2021-10-05 NOTE — ED ADULT NURSE REASSESSMENT NOTE - SYMPTOMS
none
crying
none
none

## 2021-10-05 NOTE — ED CDU PROVIDER SUBSEQUENT DAY NOTE - NS ED ROS FT
No fever/chills, No photophobia/eye pain/changes in vision, No ear pain/sore throat/dysphagia, No chest pain/palpitations, no SOB/cough/wheeze/stridor, No abdominal pain, No N/V/D, no dysuria/frequency/discharge, No neck/back pain, no rash, no changes in neurological status/function.

## 2021-10-05 NOTE — ED ADULT NURSE REASSESSMENT NOTE - STATUS
awaiting transfer, no change
hold
awaiting transfer, no change

## 2021-10-05 NOTE — ED CDU PROVIDER SUBSEQUENT DAY NOTE - HISTORY
Patient has remained stable overnight.
Patient has remained stable during the overnight period.
Pt pending psychiatric placement. no acute events.
in  no acute issues overnight
Peter NUR: no acute events overnight

## 2021-10-05 NOTE — ED CDU PROVIDER SUBSEQUENT DAY NOTE - NSICDXFAMILYHX_GEN_ALL_CORE_FT
FAMILY HISTORY:  No pertinent family history in first degree relatives    

## 2021-10-05 NOTE — ED CDU PROVIDER SUBSEQUENT DAY NOTE - WET READ LAUNCH FT
There are no Wet Read(s) to document.

## 2021-10-05 NOTE — ED ADULT NURSE REASSESSMENT NOTE - CONDITION
unchanged

## 2021-10-06 VITALS — TEMPERATURE: 96 F | HEART RATE: 86 BPM | SYSTOLIC BLOOD PRESSURE: 92 MMHG | DIASTOLIC BLOOD PRESSURE: 51 MMHG

## 2021-10-06 LAB
A1C WITH ESTIMATED AVERAGE GLUCOSE RESULT: 5.4 % — SIGNIFICANT CHANGE UP (ref 4–5.6)
ALBUMIN SERPL ELPH-MCNC: 4 G/DL — SIGNIFICANT CHANGE UP (ref 3.5–5.2)
ALP SERPL-CCNC: 68 U/L — SIGNIFICANT CHANGE UP (ref 30–115)
ALT FLD-CCNC: 15 U/L — SIGNIFICANT CHANGE UP (ref 0–41)
ANION GAP SERPL CALC-SCNC: 11 MMOL/L — SIGNIFICANT CHANGE UP (ref 7–14)
AST SERPL-CCNC: 16 U/L — SIGNIFICANT CHANGE UP (ref 0–41)
BASOPHILS # BLD AUTO: 0.02 K/UL — SIGNIFICANT CHANGE UP (ref 0–0.2)
BASOPHILS NFR BLD AUTO: 0.2 % — SIGNIFICANT CHANGE UP (ref 0–1)
BILIRUB SERPL-MCNC: 0.3 MG/DL — SIGNIFICANT CHANGE UP (ref 0.2–1.2)
BUN SERPL-MCNC: 9 MG/DL — LOW (ref 10–20)
CALCIUM SERPL-MCNC: 9.2 MG/DL — SIGNIFICANT CHANGE UP (ref 8.5–10.1)
CHLORIDE SERPL-SCNC: 104 MMOL/L — SIGNIFICANT CHANGE UP (ref 98–110)
CHOLEST SERPL-MCNC: 145 MG/DL — SIGNIFICANT CHANGE UP
CO2 SERPL-SCNC: 22 MMOL/L — SIGNIFICANT CHANGE UP (ref 17–32)
CREAT SERPL-MCNC: 0.8 MG/DL — SIGNIFICANT CHANGE UP (ref 0.7–1.5)
EOSINOPHIL # BLD AUTO: 0.06 K/UL — SIGNIFICANT CHANGE UP (ref 0–0.7)
EOSINOPHIL NFR BLD AUTO: 0.7 % — SIGNIFICANT CHANGE UP (ref 0–8)
ESTIMATED AVERAGE GLUCOSE: 108 MG/DL — SIGNIFICANT CHANGE UP (ref 68–114)
GLUCOSE SERPL-MCNC: 106 MG/DL — HIGH (ref 70–99)
HCG SERPL-ACNC: <0.6 MIU/ML — SIGNIFICANT CHANGE UP
HCT VFR BLD CALC: 39.4 % — SIGNIFICANT CHANGE UP (ref 37–47)
HDLC SERPL-MCNC: 31 MG/DL — LOW
HGB BLD-MCNC: 12.3 G/DL — SIGNIFICANT CHANGE UP (ref 12–16)
IMM GRANULOCYTES NFR BLD AUTO: 0.3 % — SIGNIFICANT CHANGE UP (ref 0.1–0.3)
LIPID PNL WITH DIRECT LDL SERPL: 96 MG/DL — SIGNIFICANT CHANGE UP
LYMPHOCYTES # BLD AUTO: 1.3 K/UL — SIGNIFICANT CHANGE UP (ref 1.2–3.4)
LYMPHOCYTES # BLD AUTO: 14.5 % — LOW (ref 20.5–51.1)
MCHC RBC-ENTMCNC: 25.3 PG — LOW (ref 27–31)
MCHC RBC-ENTMCNC: 31.2 G/DL — LOW (ref 32–37)
MCV RBC AUTO: 81.1 FL — SIGNIFICANT CHANGE UP (ref 81–99)
MONOCYTES # BLD AUTO: 0.42 K/UL — SIGNIFICANT CHANGE UP (ref 0.1–0.6)
MONOCYTES NFR BLD AUTO: 4.7 % — SIGNIFICANT CHANGE UP (ref 1.7–9.3)
NEUTROPHILS # BLD AUTO: 7.15 K/UL — HIGH (ref 1.4–6.5)
NEUTROPHILS NFR BLD AUTO: 79.6 % — HIGH (ref 42.2–75.2)
NON HDL CHOLESTEROL: 114 MG/DL — SIGNIFICANT CHANGE UP
NRBC # BLD: 0 /100 WBCS — SIGNIFICANT CHANGE UP (ref 0–0)
PLATELET # BLD AUTO: 330 K/UL — SIGNIFICANT CHANGE UP (ref 130–400)
POTASSIUM SERPL-MCNC: 4.5 MMOL/L — SIGNIFICANT CHANGE UP (ref 3.5–5)
POTASSIUM SERPL-SCNC: 4.5 MMOL/L — SIGNIFICANT CHANGE UP (ref 3.5–5)
PROT SERPL-MCNC: 6.2 G/DL — SIGNIFICANT CHANGE UP (ref 6–8)
RBC # BLD: 4.86 M/UL — SIGNIFICANT CHANGE UP (ref 4.2–5.4)
RBC # FLD: 16 % — HIGH (ref 11.5–14.5)
SODIUM SERPL-SCNC: 137 MMOL/L — SIGNIFICANT CHANGE UP (ref 135–146)
TRIGL SERPL-MCNC: 92 MG/DL — SIGNIFICANT CHANGE UP
TSH SERPL-MCNC: 0.57 UIU/ML — SIGNIFICANT CHANGE UP (ref 0.27–4.2)
WBC # BLD: 8.98 K/UL — SIGNIFICANT CHANGE UP (ref 4.8–10.8)
WBC # FLD AUTO: 8.98 K/UL — SIGNIFICANT CHANGE UP (ref 4.8–10.8)

## 2021-10-06 PROCEDURE — 99232 SBSQ HOSP IP/OBS MODERATE 35: CPT

## 2021-10-06 RX ORDER — HALOPERIDOL DECANOATE 100 MG/ML
2 INJECTION INTRAMUSCULAR EVERY 12 HOURS
Refills: 0 | Status: DISCONTINUED | OUTPATIENT
Start: 2021-10-06 | End: 2021-10-08

## 2021-10-06 RX ADMIN — Medication 0.5 MILLIGRAM(S): at 20:14

## 2021-10-06 RX ADMIN — Medication 15 MILLIGRAM(S): at 20:15

## 2021-10-06 RX ADMIN — Medication 15 MILLIGRAM(S): at 08:09

## 2021-10-06 RX ADMIN — ESCITALOPRAM OXALATE 15 MILLIGRAM(S): 10 TABLET, FILM COATED ORAL at 08:09

## 2021-10-06 RX ADMIN — ARIPIPRAZOLE 10 MILLIGRAM(S): 15 TABLET ORAL at 08:09

## 2021-10-06 NOTE — CHART NOTE - NSCHARTNOTEFT_GEN_A_CORE
Ms. White remains on the unit for continued treatment, safety and observation.  met with patient to establish rapport and encourage participation in unit groups. Patient was in bed but sat up for interview. She was pleasant and engaged in interview. Patient reports she was admitted to the hospital due to auditory hallucinations. She reports anxiety due to still experiencing the hallucinations.  provided empathy and support when appropriate.     Ms. White discussed some of her background and reports she attends a local [employer], for socialization, two days a week. At the clubhouse, she engages in art and recreational activities. She reports she lives with her mother, brother and grandmother and her family is supportive. Patient reports she has an outpatient provider in the community. The importance of mental health follow up with discussed as well as continued collaboration with treatment team.      encouraged patient to attend groups on the unit (when she feels ready) to decrease isolation and encourage treatment engagement. Patient attended the Value and Contribution group where the topic of gratitude was discussed. She had a difficult time focusing on the material and presented as internally preoccupied. Patient left the group early to rest in her room. She is encouraged to attend future sessions when she feels ready.     Ms. White will continue to meet with  for education, support and assistance with planning for discharge. She will continue to be encouraged to attend groups on the unit (when she feels ready), until discharge.

## 2021-10-06 NOTE — MEDICAL STUDENT PROGRESS NOTE(EDUCATION) - NS MD HP STUD ASPLAN PLAN FT
- Buspirone 15 mg BID  - Abilify 10 mg QD  - Lexapro 15 mg QD  - Haldol 2 mg Q12 PO PRN and Benadryl 50 mg Q6 PO PRN for acute agitation/ aggression/ psychosis  - Lorazepam 0.5 mg Q6 PO PRN for anxiety  - Buspirone 15 mg BID  - Abilify 10 mg QD  - Lexapro 15 mg QD  - Lorazepam 0.5 mg q8am,q 9pm for anxiety  - Haldol 2 mg Q12 PO PRN and Benadryl 50 mg Q6 PO PRN for acute agitation/ aggression

## 2021-10-06 NOTE — PROGRESS NOTE BEHAVIORAL HEALTH - NSBHFUPSUICINTERVALFT_PSY_A_CORE
As above, patient with CAH to kill self by choking or with a knife, states she does not wish to act on this hallucinations but reports that they are beyond her control

## 2021-10-06 NOTE — PROGRESS NOTE BEHAVIORAL HEALTH - NSBHFUPIPCHARTREVFT_PSY_A_CORE
Patient is a 26 year old female who is unemployed, lives with mother, brother and grand parents with a past psychiatric history of Bipolar disorder and intellectual disability with multiple past inpatient admissions that was BIBA for evaluation of possible suicidal ideation and AH     Patient initially evaluated on 9/29 and was to be transferred to [facility] but upon admission at [facility], was revealed that patient has CECILIA and uses a CPAP machine and patient was sent back   Patient seen for follow up and still endorsing depressed mood with suicidal thoughts and CAH to choke herself.     note from 9/29  Patient was seen and evaluated and found to be calm and cooperative. Patient stating she has been getting more depressed, stating no one understands her and constantly find herself arguing with people and has been hearing voices telling her to choke herself. patient states she tried to ignore the voices and didn't tell her mother but it has become to overwhelming now. patient reports poor sleep, poor appetite and has thoughts to hurt herself by strangling herself. Patient denies any homicidal thoughts and denies any symtoms of heath or Visual hallucinations.     Collateral info taken from patients mother who states patient has history of past admission and suicidality but didn't realized she was worsening again until today when 911 was called.

## 2021-10-06 NOTE — PROGRESS NOTE BEHAVIORAL HEALTH - NSBHADDHXPSYCHFT_PSY_A_CORE
history of bipolar and self injur behavior following with psychiatrist Dr. Chad Seay. LExapro 15mg daily, Xanax 0.25mg QID PRN, Buspar 15mg PO BID, Abilify 10mg daily

## 2021-10-06 NOTE — CHART NOTE - NSCHARTNOTESELECT_GEN_ALL_CORE
Social Work/Event Note
Event Note
Event Note
SW Note/Event Note
SW Note/Event Note
SW/Event Note
Social Work/Event Note

## 2021-10-06 NOTE — CHART NOTE - NSCHARTNOTEFT_GEN_A_CORE
17:10 SWNote: 9.27 legals faxed to [facility] / behavioral RN (Jese) called for nurse report with Patricia, per Patricia unable to take pt given that covid must be within 24 hrs .. Pt will stay overnight. SW to follow in the am.
Patient has been recommended to attend inpatient on 9.27 status. Patient requires a CPAP machine at night, therefore patient will need to be transferred to a facility that can accommodate CPAP machine. ROSANNE placed call to [facility] ([phone]) and spoke with Assistant Director of Nursing, Malu. Malu reported there are no adult female beds available at this time. ROSANNE then placed call to [facility] ([phone]) and spoke with employee, Elzbieta, whom reported they have not taken a patient whom requires CPAP in the past few months.  stopped accomodating CPAP machines when the pandemic started, as per Elzbieta. ROSANNE will continue to follow and extend referral.
Patient is to be transferred on 9.27 status. There are no beds available at [facility], or [facility]. Patient will need to attend inpatient psych facility that can accommodate cpap machine. SW continues to follow for placement.
Patient is to be transferred on 9.37 status. Dr. Romero completed 9.37 legals. ROSANNE placed call to [facility] ([phone]) and spoke with employee, Michele, whom reported there are no beds available. ROSANNE placed call to [facility] ([phone]) and spoke with employee, Benson, whom reported there are no beds available. ROSANNE placed call to [facility] ([phone]) and spoke with employee, Tayler, whom reported there are no beds available. ROSANNE placed call to [facility] ([phone]) and spoke with employee, Nadira, whom reported there are no beds available. ROSANNE placed call to [facility] and spoke with employee, Janet, whom reported there is a bed available. ROSANNE notified Janet the patient requires CPAP. Janet reported there is a private bed available and she will have MD review the case. ROSANNE continues to follow for safe and appropriate discharge planning.
ROSANNE Note: ROSANNE continuing efforts to place pt in inpt psych, barrier to placement thus far is that pt is a CPAP user, only limited facilities can accommodate for this. ROSANNE placed call  to [facility], spoke to JACQUE Collins who reports they do not have the room availability to accommodate pt at this time (pt would need private room, a C.O and cannot be placed in [facility] due to ventilation issues where the only open bed is) ROSANNE placed call to , spoke to Elzbieta who reports no beds. ROSANNE placed kimberly to [facility], spoke to Daksha who reports no beds. ROSANNE placed call to [facility], spoke to Tayler who reports no beds. ROSANNE placed call to [facility], spoke to Karley who reports no private room available for CPAP user. ROSANNE placed call to [facility], who reports no open beds today. SO unable to accommodate CPAP machine at their facility. ROSANNE continues to follow for inpt psych placement
ROSANNE Note: ROSANNE made aware pt brought back to [facility] from [facility], upon arrival to [facility] intake during medical screening pt stated she has sleep apnea and has need for CPAP machine. Pt previously did not make [facility] staff aware of need for CPAP or hx of CECILIA. [facility] unable to accommodate this, pt brought back to find appropriate placement. ROSANNE spoke to pt's mother Linda ([phone]), Linda alerted of situation, brought pt's belongings and CPAP machine, machine presently secured with pt's belongings.      Since pt left ED, pt will need new labs, covid swab, etc for placement. Of note, no beds presently at [facility], SW following for inpt psych placement
ROSANNE placed follow up phone call to [facility] ([phone]) to follow up on referral. Nadira andres MD is still reviewing the referral and will make a decision on the case today. ROSANNE continues to follow.
SW Note: Pt was transferred to [facility] 10/5/21. Called ins listed on face sheet and completed precert. Spoke with Manda [phone]. Auth approved for 2 days, Auth# [id number]. Info forwarded to [facility] UR dept.
SWNote: p/c to [facility] intake (Janet) to request info about pt's bed status ,per Janet she still looking for a bed ,worker will be called as soon as its obtained.
Patient was sent to [facility] from [facility]. Patient will need inpatient psych upon discharge and the patient requires a CPAP machine. Patient's CPAP machine is at the hospital and the patient will need to be transferred to an inpatient unit that can accommodate. ROSANNE placed call to [facility] ([phone]) and spoke with employeeMichele, whom reported there are no beds available for the patient. ROSANNE placed call to [facility] ([phone]) and spoke with employee Benson whom reported there are no beds available for the patient. ROSANNE placed call to [facility] ([phone]) and left message to inquire about inpatient psych bed availability. ROSANNE continues to follow for safe and appropriate discharge planning.

## 2021-10-06 NOTE — PROGRESS NOTE BEHAVIORAL HEALTH - NSBHFUPINTERVALHXFT_PSY_A_CORE
Patient seen and evaluated at bedside. No acute overnight events repo    Patient calm, cooperative, no acute distress, sitting comfortably in bed. Notably, interview limited by patient's limited intellectual ability. Patient reporting to undersigned that she has a history of hearing voices (a female voice of an unfamiliar person) which tells  her to hurt herself since she was young. Patient reports that recently the CAH have worsened, indicating that the voice are more significant and telling her to hurt and kill herself via choking or using a knife. She reports that she does not wish to die and does not wish to listen to said voice but inidicated that the voice is forceful and she cannot help but to listen to said voice. During interview patient was observed to hit self twice in the head with her wrist, able to stop with re-direction, patient reporting that voice commanded her to hit herself.

## 2021-10-06 NOTE — PROGRESS NOTE BEHAVIORAL HEALTH - SUMMARY
Patient is a 26 year old female who is unemployed, lives with mother, brother and grand parents with a past psychiatric history of Bipolar disorder and intellectual disability with multiple past inpatient admissions that was BIBA for evaluation of possible suicidal ideation and AH. Patient admitted for CAH.    encounter found patient to be calm, cooperative, but reporting command AH to hurt herself, found to currently obeying commands to an extent. Patient warrants inpatient psychiatric hospitalization for safety and stability.     plan:  Meds :  - Buspirone 15 mg BID  - Abilify 10 mg QD  - Lexapro 15 mg QD  - Haldol 5 mg Q6 PO PRN and Benadryl 50 mg Q6 PO PRN for acute agitation/ aggression/ psychosis. Patient is a 26 year old female who is unemployed, lives with mother, brother and grand parents with a past psychiatric history of Bipolar disorder and intellectual disability with multiple past inpatient admissions that was BIBA for evaluation of possible suicidal ideation and AH. Patient admitted for CAH.    encounter found patient to be calm, cooperative, but reporting command AH to hurt herself, found to currently obeying commands to an extent. Patient warrants inpatient psychiatric hospitalization for safety and stability.     plan:  Meds :  - Buspirone 15 mg BID  - Abilify 10 mg QD  - Lexapro 15 mg QD  - ativan 0.5mg po at 8am and 9PM  - Haldol 5 mg Q6 PO PRN and Benadryl 50 mg Q6 PO PRN for acute agitation/ aggression

## 2021-10-06 NOTE — CHART NOTE - NSCHARTNOTEFT_GEN_A_CORE
Social Work Admit Note:    Patient is 26 years of age female who was admitted for evaluation of suicidal ideation and audio hallucinations.  At time of assessment in the emergency department patient endorsed worsening depression and hearing voices telling her to choke herself.  These voices have become overwhelming.  Symptoms endorsed by patient included poor appetite and poor sleep.   Per mother report patient has history of treatment for suicidality.      In the community patient resides in a private residence with her mother, brother, and grandparents in [place], [place].  Treatment history is for bipolar disorder and intellectual disability.  She has history of head banging and self-choking.  Patient has history of multiple prior inpatient admissions.  Outpatient treatment has been with psychiatrist Dr. Donn Seay.      Sexual History – patient identifies as heterosexual. 	    Family relationships and history – Patient’s mother is her primary social support.     Leisure Activity Assessment – not disclosed.          Community Supports – No known attendance in any self- help groups or other organizations.     Employment – patient is not employed     Substance Use Assessment – use of alcohol or other substances denied.       History of suicidality or self- injurious behaviors – no known history         Significant Loses – None identified.      Life Goals – not disclosed       will continue to meet with patient 1:1 and with treatment team daily.  Discharge plan is for continued mental health treatment in outpatient setting.      Please refer to Social Work Psychosocial for additional information.
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
## 2021-10-06 NOTE — PROGRESS NOTE BEHAVIORAL HEALTH - RISK ASSESSMENT
risk factors include presenting symptoms, psychosis, past psychiatric hospitalizations which are currently not mitigated by her help seeking behavior or strong support system.

## 2021-10-06 NOTE — MEDICAL STUDENT PROGRESS NOTE(EDUCATION) - NS MD HP STUD ASPLAN ASSES FT
Tayler White is a 26-year-old female who is single, unemployed, lives with mother, brother, and grandparents, with past psychiatric history of bipolar disorder, depression, schizophrenia, autism and multiple in-patient hospitalizations. She was brought in by ambulance for possible suicidal ideations and auditory hallucinations after her friends from online group therapy noticed she was acting strangely-- hitting herself on the head and shaking her hands. Tayler White is a 26-year-old female who is single, unemployed, lives with mother, brother, and grandparents, with past psychiatric history of bipolar disorder, depression, schizophrenia, autism and multiple in-patient hospitalizations.     Evaluation today found patient to be reporting command auditory hospitalizations to hurt herself and others, reporting she cannot control the subsequent urge to harm herself. Patient requires continued inpatient psychiatric admission for safety and stabilization

## 2021-10-07 DIAGNOSIS — F31.9 BIPOLAR DISORDER, UNSPECIFIED: ICD-10-CM

## 2021-10-07 DIAGNOSIS — F79 UNSPECIFIED INTELLECTUAL DISABILITIES: ICD-10-CM

## 2021-10-07 LAB — SARS-COV-2 RNA SPEC QL NAA+PROBE: SIGNIFICANT CHANGE UP

## 2021-10-07 PROCEDURE — 99232 SBSQ HOSP IP/OBS MODERATE 35: CPT

## 2021-10-07 RX ADMIN — Medication 15 MILLIGRAM(S): at 08:03

## 2021-10-07 RX ADMIN — Medication 15 MILLIGRAM(S): at 20:22

## 2021-10-07 RX ADMIN — ARIPIPRAZOLE 10 MILLIGRAM(S): 15 TABLET ORAL at 08:04

## 2021-10-07 RX ADMIN — Medication 0.5 MILLIGRAM(S): at 20:22

## 2021-10-07 RX ADMIN — ESCITALOPRAM OXALATE 15 MILLIGRAM(S): 10 TABLET, FILM COATED ORAL at 08:04

## 2021-10-07 RX ADMIN — HALOPERIDOL DECANOATE 2 MILLIGRAM(S): 100 INJECTION INTRAMUSCULAR at 18:14

## 2021-10-07 RX ADMIN — Medication 0.5 MILLIGRAM(S): at 08:02

## 2021-10-07 NOTE — PROGRESS NOTE BEHAVIORAL HEALTH - NSBHFUPINTERVALHXFT_PSY_A_CORE
Chart reviewed, patient seen and evaluated in semi-private setting. Nursing staff reporting patient requiring redirection but otherwise well.     Encounter found patient to be calm, cooperative, no acute distress. Patient reporting that she  continues having auditory hallucination of the female voice that tells patient to choke herself, reporting no improvement in intensity of voices  compared to yesterday. Patient reports that she does not want to listen to the voice and wants to live. She reports she continues to feel sad and depressed but reported improvement in sleep and anxiety. Chart reviewed, patient seen and evaluated in semi-private setting. Nursing staff reporting patient requiring redirection but otherwise well.     Encounter found patient to be calm, cooperative, no acute distress. Patient reporting that she  continues having auditory hallucination of the female voice that tells patient to choke herself, reporting no improvement in intensity of voices  compared to yesterday. Patient reports that she does not want to listen to the voice and wants to live. She reports she continues to feel sad and depressed but reported improvement in sleep and anxiety.    Spoke with mother, Mrs. White @[phone]. Mother states she has not been able to speak with patient today but reports that yesterday patient had expressed feeling anxious. Mother updated on plan, in agreement to continue to monitor and adjust medications as necessary. Mother states patient expressed desire to come home. Mother stated she will visit patient over the weekend.    Attempted to reach out to outpatient Psychiatry Dr. Price @ [phone]. Answering person reports they will relay message for callback to physician.

## 2021-10-07 NOTE — PROGRESS NOTE BEHAVIORAL HEALTH - NSBHCHARTREVIEWLAB_PSY_A_CORE FT
12.3   8.98  )-----------( 330      ( 06 Oct 2021 07:40 )             39.4   10-06    137  |  104  |  9<L>  ----------------------------<  106<H>  4.5   |  22  |  0.8    Ca    9.2      06 Oct 2021 07:40    TPro  6.2  /  Alb  4.0  /  TBili  0.3  /  DBili  x   /  AST  16  /  ALT  15  /  AlkPhos  68  10-06    A1C with Estimated Average Glucose (10.06.21 @ 07:40)    A1C with Estimated Average Glucose Result: 5.4 %    Estimated Average Glucose: 108  mg/dL    Lipid Profile (10.06.21 @ 07:40)    Cholesterol, Serum: 145 mg/dL    Triglycerides, Serum: 92 mg/dL    HDL Cholesterol, Serum: 31 mg/dL    Non HDL Cholesterol: 114 mg/dL    LDL Cholesterol Calculated: 96 mg/dL
12.3   8.98  )-----------( 330      ( 06 Oct 2021 07:40 )             39.4   10-06    137  |  104  |  9<L>  ----------------------------<  106<H>  4.5   |  22  |  0.8    Ca    9.2      06 Oct 2021 07:40    TPro  6.2  /  Alb  4.0  /  TBili  0.3  /  DBili  x   /  AST  16  /  ALT  15  /  AlkPhos  68  10-06  Lipid Profile (10.06.21 @ 07:40)    Cholesterol, Serum: 145 mg/dL    Triglycerides, Serum: 92 mg/dL    HDL Cholesterol, Serum: 31 mg/dL    Non HDL Cholesterol: 114 mg/dL    LDL Cholesterol Calculated: 96 mg/dL    HCG Quantitative, Serum: <0.6  (10.06.21 @ 07:40)

## 2021-10-07 NOTE — PROGRESS NOTE BEHAVIORAL HEALTH - SUMMARY
Patient is a 26 year old female who is unemployed, lives with mother, brother and grand parents with a past psychiatric history of Bipolar disorder and intellectual disability with multiple past inpatient admissions that was BIBA for evaluation of possible suicidal ideation and AH. Patient admitted for CAH.    Encounter found patient to be calm, cooperative, continuing to report command AH to hurt herself but found to be in better behavioral control and impulsivity control. Patient warrants inpatient psychiatric hospitalization for safety and stability.       # Bipolar disorder  - Buspirone 15 mg BID  - Abilify 10 mg QD  - Lexapro 15 mg QD  - ativan 0.5mg po at 8am and 9PM  - Haldol 5 mg Q6 PO PRN and Benadryl 50 mg Q6 PO PRN for acute agitation/ aggression.

## 2021-10-07 NOTE — MEDICAL STUDENT PROGRESS NOTE(EDUCATION) - SUBJECTIVE AND OBJECTIVE BOX
Tayler White is a 26-year-old female who is single, unemployed, lives with mother, brother, and grandparents, with past psychiatric history of bipolar disorder, depression, schizophrenia, autism and multiple in-patient hospitalizations. She was brought in by ambulance for possible suicidal ideations and auditory hallucinations after her friends from online group therapy noticed she was acting strangely-- hitting herself on the head and shaking her hands.     Past 24-h events:     Tayler reports to have continued auditory hallucinations-- a female voice that is "mean" and tells the patient to choke herself. There is no improvement with this voice between yesterday and today. She continues to have thoughts of choking herself and feels depressed. Appetite is still poor--patient did not eat dinner yesterday and only had some of her breakfast this morning. Sleep was okay. Patient endorses improvement with her anxiety, however, and appreciates the frequent check-ins.     Patient continues Abilify 10mg PO QD, Buspar 15mg PO BID, Lexapro 15mg PO QD, and Lorazepam 0.5mg PO BID.     MENTAL STATUS EXAM:   · Level of Consciousness	Alert  · General Appearance	Deformities present  · Body Habitus	Overweight  · Hygiene	Fair  · Grooming	Poor  · Behavior	Cooperative  · Eye Contact	Poor  · Relatedness	Good  · Impulse Control	Impaired  · Speech Volume	Normal  · Speech Rate	Normal  · Speech Spontaneity	Normal  · Speech Articulation	Normal  · Reported mood	Depressed  · Affect Quality	Depressed  · Affect Range	Blunted  · Affect Congruence	Congruent  · Thought Process	Perseverative  · Thought Associations	Normal  · Thought Content	Preoccupations  · Perceptions	Auditory hallucinations  · Command	Yes  · Details	female voice telling her to choke herself and hit herself  · Oriented to Time	Yes  · Oriented to Place	Yes  · Oriented to Situation	Yes  · Oriented to Person	Yes  · Attention / Concentration	Normal  · Estimated Intelligence	Below Average  · Recent Memory	Normal  · Remote Memory	Normal  · Fund of Knowledge	Impaired  · Language	No abnormalities noted  · Judgment (regarding everyday events)	Poor  · Insight (regarding psychiatric illness)	Poor  	  	  	    SUICIDALITY:   · Suicidality (Interval)	yes  · Passive Ideation	yes  · Active Ideation	yes  · Plan	yes  · Intent	yes  · Details	As above, patient with chronic auditory hallucinations to kill self by choking or with a knife. She states she does not wish to act on this hallucinations but reports that they are beyond her control    HOMICIDALITY/AGGRESSION:   · Homicidality/Aggression	no

## 2021-10-07 NOTE — MEDICAL STUDENT PROGRESS NOTE(EDUCATION) - NS MD HP STUD ASPLAN ASSES FT
Tayler White is a 26-year-old female who is single, unemployed, lives with mother, brother, and grandparents, with past psychiatric history of bipolar disorder, depression, schizophrenia, autism and multiple in-patient hospitalizations. She was brought in by ambulance for possible suicidal ideations and auditory hallucinations after her friends from online group therapy noticed she was acting strangely-- hitting herself on the head and shaking her hands.    On evaluation, patient continues to have auditory hallucinations telling her to hurt herself by choking and hitting her head. As she reports of inability to control these thoughts and herself, she requires continued psychiatric hospitalization and monitoring until she is stable.

## 2021-10-07 NOTE — MEDICAL STUDENT PROGRESS NOTE(EDUCATION) - NS MD HP STUD ASPLAN PLAN FT
1. Continue medications:   - Buspirone 15 mg BID  - Abilify 10 mg QD  - Lexapro 15 mg QD  - Lorazepam 0.5 mg q8am,q 9pm for anxiety  - Haldol 2 mg Q12 PO PRN and Benadryl 50 mg Q6 PO PRN for acute agitation/ aggression    2. Continue encouraging group therapy participation for socialization and decrease isolation.

## 2021-10-08 PROCEDURE — 99232 SBSQ HOSP IP/OBS MODERATE 35: CPT

## 2021-10-08 RX ORDER — HALOPERIDOL DECANOATE 100 MG/ML
2 INJECTION INTRAMUSCULAR AT BEDTIME
Refills: 0 | Status: DISCONTINUED | OUTPATIENT
Start: 2021-10-08 | End: 2021-10-11

## 2021-10-08 RX ORDER — ARIPIPRAZOLE 15 MG/1
5 TABLET ORAL DAILY
Refills: 0 | Status: COMPLETED | OUTPATIENT
Start: 2021-10-09 | End: 2021-10-10

## 2021-10-08 RX ADMIN — Medication 0.5 MILLIGRAM(S): at 08:09

## 2021-10-08 RX ADMIN — Medication 15 MILLIGRAM(S): at 20:14

## 2021-10-08 RX ADMIN — ESCITALOPRAM OXALATE 15 MILLIGRAM(S): 10 TABLET, FILM COATED ORAL at 08:09

## 2021-10-08 RX ADMIN — ARIPIPRAZOLE 10 MILLIGRAM(S): 15 TABLET ORAL at 08:08

## 2021-10-08 RX ADMIN — HALOPERIDOL DECANOATE 2 MILLIGRAM(S): 100 INJECTION INTRAMUSCULAR at 20:14

## 2021-10-08 RX ADMIN — Medication 0.5 MILLIGRAM(S): at 20:14

## 2021-10-08 RX ADMIN — HALOPERIDOL DECANOATE 2 MILLIGRAM(S): 100 INJECTION INTRAMUSCULAR at 11:27

## 2021-10-08 RX ADMIN — Medication 15 MILLIGRAM(S): at 08:09

## 2021-10-08 NOTE — PROGRESS NOTE BEHAVIORAL HEALTH - SUMMARY
Patient is a 26 year old female who is unemployed, lives with mother, brother and grand parents with a past psychiatric history of Bipolar disorder and intellectual disability with multiple past inpatient admissions that was BIBA for evaluation of possible suicidal ideation and AH. Patient admitted for CAH.    Encounter found patient to be calm, cooperative, smiling continuing to report command AH to hurt herself but found to be significantly better behavioral control and impulsivity control. She required an extra dose of Haldol due to auditory hallucination of female voice telling her to kill herself and due to increased anxiety. Patient current warrants inpatient psychiatric hospitalization for safety and stability before considering discharge.       # Bipolar disorder  -Trial of Haldol 2mg  - Buspirone 15 mg BID  - Abilify 10 mg QD  - Lexapro 15 mg QD  - ativan 0.5mg po at 8am and 9PM  - Haldol 5 mg Q6 PO PRN and Benadryl 50 mg Q6 PO PRN for acute agitation/ aggression. Patient is a 26 year old female who is unemployed, lives with mother, brother and grand parents with a past psychiatric history of Bipolar disorder and intellectual disability with multiple past inpatient admissions that was BIBA for evaluation of possible suicidal ideation and AH. Patient admitted for CAH.    Encounter found patient to be calm, cooperative, smiling continuing to report command AH to hurt herself but found to be significantly better behavioral control and impulsivity control. She required an extra dose of Haldol due to auditory hallucination of female voice telling her to kill herself and due to increased anxiety. Patient current warrants inpatient psychiatric hospitalization for safety and stability before considering discharge.       # Bipolar disorder, depressed with psychotic features   - Trial of Haldol 2mg  - Buspirone 15 mg BID  - Abilify 5 mg Daily  - Lexapro 15 mg Daily  - ativan 0.5mg po at 8am and 9PM  - Haldol 5 mg Q6 PO PRN and Benadryl 50 mg Q6 PO PRN for acute agitation/ aggression.

## 2021-10-08 NOTE — PROGRESS NOTE BEHAVIORAL HEALTH - NSBHFUPINTERVALHXFT_PSY_A_CORE
Chart reviewed, patient seen and evaluated this AM. Nursing staff reported patient requiring extra dose of Haldol yesterday due to thoughts of wanting to kill herself. Today, patient is calm, cooperative, and in no acute distress. She is seen smiling and making more eye contact than usual. She slept well last night and has a normal appetite. Patient reports that she continues having auditory hallucinations of the female voice, but states some improvement in the intensity of voices compared to yesterday. Patient reports that she does not want to listen to the voice and wants to live. She reports overall improvement in sleep and anxiety. When asked about whether she has made friends in the unit, she replies "No because I'm shy." She feels safe in the unit.  Patient expresses desire to return home. Chart reviewed, patient seen and evaluated this AM. Nursing staff reported patient requiring extra dose of Haldol yesterday due to thoughts of wanting to kill herself. Today, patient is calm, cooperative, and in no acute distress. She is seen smiling and making more eye contact than usual. She slept well last night and has a normal appetite. Patient reports that she continues having auditory hallucinations of the female voice, but states some improvement in the intensity of voices compared to yesterday. Patient reports that she does not want to listen to the voice and wants to live. She reports overall improvement in sleep and anxiety. When asked about whether she has made friends in the unit, she replies "No because I'm shy." She feels safe in the unit.  Patient expresses desire to return home.    Spoke with outpatient provider Dr. Price, who reported patient is new to him since june. He reports that patient expressed command AH to him as well but evaluation found it unclear if these AH were psychotic in nature or internal monologue. Dr. Price reported that weeks leading up to hospitalization the patient had expressed increase in CAH which lead him to make more rapid medication changes including the addition of ativan 0.25mg BID PRN.

## 2021-10-09 RX ADMIN — Medication 0.5 MILLIGRAM(S): at 20:08

## 2021-10-09 RX ADMIN — Medication 15 MILLIGRAM(S): at 20:07

## 2021-10-09 RX ADMIN — Medication 0.5 MILLIGRAM(S): at 08:18

## 2021-10-09 RX ADMIN — ARIPIPRAZOLE 5 MILLIGRAM(S): 15 TABLET ORAL at 08:19

## 2021-10-09 RX ADMIN — ESCITALOPRAM OXALATE 15 MILLIGRAM(S): 10 TABLET, FILM COATED ORAL at 08:19

## 2021-10-09 RX ADMIN — Medication 15 MILLIGRAM(S): at 08:19

## 2021-10-09 RX ADMIN — HALOPERIDOL DECANOATE 2 MILLIGRAM(S): 100 INJECTION INTRAMUSCULAR at 20:07

## 2021-10-09 NOTE — PROGRESS NOTE BEHAVIORAL HEALTH - NSBHFUPINTERVALHXFT_PSY_A_CORE
Chart reviewed, patient seen and evaluated , discussed with staff    No acute event overnight   .  she denies s/h ideations intent or plan  Today, patient is calm, cooperative, and in no acute distress. She is seen smiling and making more eye contact than usual. She slept well last night and has a normal appetite. Patient reports that she continues having auditory hallucinations of the female voice, but states some improvement in the intensity of voices compared to yesterday. Patient reports that she does not want to listen to the voice and wants to live. She reports overall improvement in sleep and anxiety. When asked about whether she has made friends in the unit, she replies "No because I'm shy." She feels safe in the unit.  Patient expresses desire to return home.    Spoke with outpatient provider Dr. Price, who reported patient is new to him since june. He reports that patient expressed command AH to him as well but evaluation found it unclear if these AH were psychotic in nature or internal monologue. Dr. Price reported that weeks leading up to hospitalization the patient had expressed increase in CAH which lead him to make more rapid medication changes including the addition of ativan 0.25mg BID PRN.

## 2021-10-09 NOTE — PROGRESS NOTE BEHAVIORAL HEALTH - SUMMARY
Patient is a 26 year old female who is unemployed, lives with mother, brother and grand parents with a past psychiatric history of Bipolar disorder and intellectual disability with multiple past inpatient admissions that was BIBA for evaluation of possible suicidal ideation and AH. Patient admitted for CAH.    Encounter found patient to be calm, cooperative, smiling continuing to report command AH to hurt herself but found to be significantly better behavioral control and impulsivity control. She required an extra dose of Haldol due to auditory hallucination of female voice telling her to kill herself and due to increased anxiety. Patient current warrants inpatient psychiatric hospitalization for safety and stability before considering discharge.       # Bipolar disorder, depressed with psychotic features   - Trial of Haldol 2mg  - Buspirone 15 mg BID  - Abilify 5 mg Daily  - Lexapro 15 mg Daily  - ativan 0.5mg po at 8am and 9PM  - Haldol 5 mg Q6 PO PRN and Benadryl 50 mg Q6 PO PRN for acute agitation/ aggression.

## 2021-10-09 NOTE — PROGRESS NOTE BEHAVIORAL HEALTH - RISK ASSESSMENT
risk factors include presenting symptoms, psychosis, past psychiatric hospitalizations which are currently not mitigated by her help seeking behavior or strong support system

## 2021-10-10 PROCEDURE — 99232 SBSQ HOSP IP/OBS MODERATE 35: CPT

## 2021-10-10 RX ADMIN — ESCITALOPRAM OXALATE 15 MILLIGRAM(S): 10 TABLET, FILM COATED ORAL at 08:42

## 2021-10-10 RX ADMIN — Medication 0.5 MILLIGRAM(S): at 20:50

## 2021-10-10 RX ADMIN — Medication 15 MILLIGRAM(S): at 20:50

## 2021-10-10 RX ADMIN — Medication 15 MILLIGRAM(S): at 08:42

## 2021-10-10 RX ADMIN — Medication 0.5 MILLIGRAM(S): at 08:42

## 2021-10-10 RX ADMIN — ARIPIPRAZOLE 5 MILLIGRAM(S): 15 TABLET ORAL at 08:42

## 2021-10-10 RX ADMIN — HALOPERIDOL DECANOATE 2 MILLIGRAM(S): 100 INJECTION INTRAMUSCULAR at 20:50

## 2021-10-10 NOTE — PROGRESS NOTE BEHAVIORAL HEALTH - SUMMARY
Patient is a 26 year old female who is unemployed, lives with mother, brother and grand parents with a past psychiatric history of Bipolar disorder and intellectual disability with multiple past inpatient admissions that was BIBA for evaluation of possible suicidal ideation and AH. Patient admitted for CAH.    Encounter found patient to be calm, cooperative, smiling continuing to report command AH to hurt herself but found to be significantly better behavioral control and impulsivity control. . Patient current warrants inpatient psychiatric hospitalization for safety

## 2021-10-10 NOTE — PROGRESS NOTE BEHAVIORAL HEALTH - NSBHFUPINTERVALHXFT_PSY_A_CORE
Chart reviewed, patient is seen and evaluated , discussed with staff    No acute event overnight   .  she denies s/h ideations intent or plan  Today, patient is calm, cooperative, and in no acute distress. She is smiling and making more eye contact . She slept well last night and has a normal appetite. Patient has less a/h.  She reports overall improvement in sleep and anxiety.  She feels safe in the unit.  Patient expresses desire to return home.    	Spoke with outpatient provider Dr. Price, who reported patient is new to him since june. He reports that patient expressed command AH to him as well but evaluation found it unclear if these AH were psychotic in nature or internal monologue. Dr. Price reported that weeks leading up to hospitalization the patient had expressed increase in CAH which lead him to make more rapid medication changes including the addition of ativan 0.25mg BID PRN.

## 2021-10-11 ENCOUNTER — APPOINTMENT (OUTPATIENT)
Dept: NEUROLOGY | Facility: CLINIC | Age: 26
End: 2021-10-11

## 2021-10-11 LAB — SARS-COV-2 RNA SPEC QL NAA+PROBE: SIGNIFICANT CHANGE UP

## 2021-10-11 PROCEDURE — 99232 SBSQ HOSP IP/OBS MODERATE 35: CPT

## 2021-10-11 RX ORDER — HALOPERIDOL DECANOATE 100 MG/ML
4 INJECTION INTRAMUSCULAR AT BEDTIME
Refills: 0 | Status: DISCONTINUED | OUTPATIENT
Start: 2021-10-11 | End: 2021-10-13

## 2021-10-11 RX ADMIN — Medication 50 MILLIGRAM(S): at 12:39

## 2021-10-11 RX ADMIN — HALOPERIDOL DECANOATE 4 MILLIGRAM(S): 100 INJECTION INTRAMUSCULAR at 20:27

## 2021-10-11 RX ADMIN — Medication 15 MILLIGRAM(S): at 20:28

## 2021-10-11 RX ADMIN — Medication 0.5 MILLIGRAM(S): at 12:40

## 2021-10-11 RX ADMIN — ESCITALOPRAM OXALATE 15 MILLIGRAM(S): 10 TABLET, FILM COATED ORAL at 08:06

## 2021-10-11 RX ADMIN — Medication 15 MILLIGRAM(S): at 08:06

## 2021-10-11 RX ADMIN — Medication 0.5 MILLIGRAM(S): at 08:08

## 2021-10-11 RX ADMIN — Medication 0.5 MILLIGRAM(S): at 20:27

## 2021-10-11 NOTE — PROGRESS NOTE BEHAVIORAL HEALTH - SUMMARY
Patient is a 26 year old female who is unemployed, lives with mother, brother and grand parents with a past psychiatric history of Bipolar disorder and intellectual disability with multiple past inpatient admissions that was BIBA for evaluation of possible suicidal ideation and AH. Patient admitted for CAH on 10/06/21 .    Encounter found patient to be calm, cooperative, smiling denies any AH to hurt herself and found to be significantly better behavioral control and impulsivity control compared to time of admission. Patient reports to be doing much better and desires to return home if possible. Tolerating med changes well. Patient is currently in inpatient psychiatric hospitalization for safety and stability before considering discharge.      # Bipolar disorder, depressed with psychotic features   - Haldol 2mg  - Buspirone 15 mg BID  - Lexapro 15 mg Daily  - ativan 0.5mg po at 8am and 9PM  - Haldol 5 mg Q6 PO PRN and Benadryl 50 mg Q6 PO PRN for acute agitation/ aggression. Patient is a 26 year old female who is unemployed, lives with mother, brother and grand parents with a past psychiatric history of Bipolar disorder and intellectual disability with multiple past inpatient admissions that was BIBA for evaluation of possible suicidal ideation and AH. Patient admitted for CAH on 10/06/21 .    Initial encounter found patient to be calm, cooperative, denied avh AH to hurt herself and in better behavioral control, however, afternoon found patient to once more hurting self and admitting to CAH. Given above, patient warrants continued IPP admission for safety and stabilization.     # Bipolar disorder, depressed with psychotic features   - increase haldol to Haldol 4mg  - Buspirone 15 mg BID  - Lexapro 15 mg Daily  - ativan 0.5mg po at 8am and 9PM  - Haldol 5 mg Q6 PO PRN and Benadryl 50 mg Q6 PO PRN for acute agitation/ aggression.

## 2021-10-11 NOTE — PROGRESS NOTE BEHAVIORAL HEALTH - NSBHFUPINTERVALHXFT_PSY_A_CORE
Chart reviewed, patient seen and evaluated this AM in teams meeting. No acute events over the weekend per nursing. Patient states that shes doing better and has been compliant with her medications. Tolerating trial of Haldol 2mg well since Friday, no side effects/symptoms reported by patient. Her mother visited yesterday which made the patient very happy. She continues to have daily conversations with her mother on the phone and reports that the conversations have been going well. She currently denies SI/HI  intent or plan. Today, patient is calm, cooperative, and smiling. No acute distress noted. She is well rested as she slept well last night and has a normal appetite. Patient reports that the auditory hallucinations of the female voice telling her to hurt herself and the visual hallucinations of the figure are no longer present. No reports of self-injurious behavior. She reports overall improvement and wishes to return home. Chart reviewed, patient seen and evaluated this AM in teams meeting. No acute events over the weekend per nursing.     Today, patient is calm, cooperative, and smiling, No acute distress noted. This morning Patient reports that the auditory hallucinations of the female voice telling her to hurt herself and the visual hallucinations of the figure are no longer present. Patient reports no self-injurious ideation. She reports overall improvement and wishes to return home. She is well rested as she slept well last night and has a normal appetite. Patient states her mother visited yesterday which made the patient very happy. Patient denied SI/HI  intent or plan.     In the afternoon, patient was noted to be isolative, giggling inappropriately to self and noted to be hitting self on the head once more. Patient admitted to undersigned that she once more began to experience CAH to hurt herself.

## 2021-10-12 PROCEDURE — 73610 X-RAY EXAM OF ANKLE: CPT | Mod: 26,RT

## 2021-10-12 PROCEDURE — 99231 SBSQ HOSP IP/OBS SF/LOW 25: CPT

## 2021-10-12 PROCEDURE — 73630 X-RAY EXAM OF FOOT: CPT | Mod: 26,RT

## 2021-10-12 RX ORDER — HALOPERIDOL DECANOATE 100 MG/ML
2 INJECTION INTRAMUSCULAR
Qty: 28 | Refills: 0
Start: 2021-10-12 | End: 2021-10-25

## 2021-10-12 RX ORDER — ESCITALOPRAM OXALATE 10 MG/1
3 TABLET, FILM COATED ORAL
Qty: 42 | Refills: 0
Start: 2021-10-12 | End: 2021-10-25

## 2021-10-12 RX ORDER — IBUPROFEN 200 MG
400 TABLET ORAL THREE TIMES A DAY
Refills: 0 | Status: DISCONTINUED | OUTPATIENT
Start: 2021-10-12 | End: 2021-10-13

## 2021-10-12 RX ADMIN — Medication 400 MILLIGRAM(S): at 16:53

## 2021-10-12 RX ADMIN — HALOPERIDOL DECANOATE 4 MILLIGRAM(S): 100 INJECTION INTRAMUSCULAR at 20:16

## 2021-10-12 RX ADMIN — Medication 0.5 MILLIGRAM(S): at 20:16

## 2021-10-12 RX ADMIN — Medication 15 MILLIGRAM(S): at 08:11

## 2021-10-12 RX ADMIN — ESCITALOPRAM OXALATE 15 MILLIGRAM(S): 10 TABLET, FILM COATED ORAL at 08:11

## 2021-10-12 RX ADMIN — Medication 0.5 MILLIGRAM(S): at 08:21

## 2021-10-12 RX ADMIN — Medication 15 MILLIGRAM(S): at 20:16

## 2021-10-12 NOTE — DISCHARGE NOTE BEHAVIORAL HEALTH - NSBHDCPHYSCONTACTFT_PSY_A_CORE
LevelUp access code: NA     ASSESSMENT   [id number])  Neuromuscular Re-Education (78729)  Therapeutic Activity (61766)  Therapeutic Exercise (86812)     Dr. Price

## 2021-10-12 NOTE — DISCHARGE NOTE BEHAVIORAL HEALTH - MEDICATION SUMMARY - MEDICATIONS TO TAKE
I will START or STAY ON the medications listed below when I get home from the hospital:    Xanax 0.25 mg oral tablet  -- 1 tab(s) by mouth 4 times a day, As Needed for anxiety, until stopped by MD  -- Indication: For anxiety    BuSpar Dividose 15 mg oral tablet  -- 1 tab(s) by mouth 2 times a day, until stopped by MD  -- Indication: For anxiety   I will START or STAY ON the medications listed below when I get home from the hospital:    escitalopram 5 mg oral tablet  -- 3 tab(s) by mouth once a day, until stopped by MD  -- Indication: For mood sx    haloperidol 2 mg oral tablet  -- 2 tab(s) by mouth once a day (at bedtime), until stopped by MD   -- Indication: For mood sx    Xanax 0.25 mg oral tablet  -- 1 tab(s) by mouth 4 times a day, As Needed for anxiety, until stopped by MD  -- Indication: For anxiety    BuSpar Dividose 15 mg oral tablet  -- 1 tab(s) by mouth 2 times a day, until stopped by MD  -- Indication: For anxiety

## 2021-10-12 NOTE — MEDICAL STUDENT PROGRESS NOTE(EDUCATION) - NS MD HP STUD ASPLAN PLAN FT
Continue:   - Buspirone 15 mg BID  - Lexapro 15 mg Daily  - Haldol 4 mg PO at 9PM  - Ativan 0.5mg po at 8am and 9PM  - Haldol 5 mg Q6 PO PRN and Benadryl 50 mg Q6 PO PRN for acute agitation/ aggression.

## 2021-10-12 NOTE — DISCHARGE NOTE BEHAVIORAL HEALTH - MEDICATION SUMMARY - MEDICATIONS TO STOP TAKING
I will STOP taking the medications listed below when I get home from the hospital:    Abilify 15 mg oral tablet  -- 1 tab(s) by mouth once a day, until stopped by MD

## 2021-10-12 NOTE — PROGRESS NOTE BEHAVIORAL HEALTH - NSBHFUPINTERVALHXFT_PSY_A_CORE
Chart reviewed, patient seen and evaluated at bedside. No acute overnight events reported.     Today, Tayler is calm, cooperative, and smiling. She does not appear to be in acute distress. Patient denies any voice telling her to hurt herself or visual hallucination of a strange figure. She does not want to hurt herself and states she will try to refrain from doing so when she hears the voice. Her spirits have been up since her mom visited her and patient was able to attend a group session yesterday. Patient denied h/i.    Spoke with mother, Mrs. White @[phone], who reports, that she thinks patient has shown remarkable improvement in responding to CAH as well as improvement in mood. Mother reported that she feels comfortable with taking patient home .

## 2021-10-12 NOTE — PROGRESS NOTE BEHAVIORAL HEALTH - NSBHADMITIPOBSFT_PSY_A_CORE
unit protocol
safety . pt has sleep apnea
as clinically indicated
unit protocol
as clinically indicated
unit protocol
as clinically indicated

## 2021-10-12 NOTE — PROGRESS NOTE BEHAVIORAL HEALTH - NS ED BHA REVIEW OF ED CHART AVAILABLE IMAGING REVIEWED
Noe Saleem(Resident)
None available

## 2021-10-12 NOTE — PROGRESS NOTE BEHAVIORAL HEALTH - NSBHADMITDANGERSELF_PSY_A_CORE
unable to care for self
suicidal behavior
unable to care for self

## 2021-10-12 NOTE — PROGRESS NOTE BEHAVIORAL HEALTH - DETAILS
as above
female voice telling her to hurt herself and kill herself
as above

## 2021-10-12 NOTE — DISCHARGE NOTE BEHAVIORAL HEALTH - HPI (INCLUDE ILLNESS QUALITY, SEVERITY, DURATION, TIMING, CONTEXT, MODIFYING FACTORS, ASSOCIATED SIGNS AND SYMPTOMS)
Patient is a 26 year old female who is unemployed, lives with mother, brother and grand parents with a past psychiatric history of Bipolar disorder and intellectual disability with multiple past inpatient admissions that was BIBA for evaluation of possible suicidal ideation and AH     Patient initially evaluated on 9/29 and was to be transferred to [facility] but upon admission at [facility], was revealed that patient has CECILIA and uses a CPAP machine and patient was sent back   Patient seen for follow up and still endorsing depressed mood with suicidal thoughts and CAH to choke herself.     note from 9/29  Patient was seen and evaluated and found to be calm and cooperative. Patient stating she has been getting more depressed, stating no one understands her and constantly find herself arguing with people and has been hearing voices telling her to choke herself. patient states she tried to ignore the voices and didn't tell her mother but it has become to overwhelming now. patient reports poor sleep, poor appetite and has thoughts to hurt herself by strangling herself. Patient denies any homicidal thoughts and denies any symtoms of heath or Visual hallucinations.     Collateral info taken from patients mother who states patient has history of past admission and suicidality but didn't realized she was worsening again until today when 911 was called.

## 2021-10-12 NOTE — PROGRESS NOTE BEHAVIORAL HEALTH - NSBHADMITIPOBS_PSY_A_CORE
Routine observation
Enhanced supervision
Routine observation

## 2021-10-12 NOTE — PROGRESS NOTE BEHAVIORAL HEALTH - NSBHCHARTREVIEWVS_PSY_A_CORE FT
Vital Signs Last 24 Hrs  T(C): 36.3 (12 Oct 2021 08:14), Max: 36.3 (12 Oct 2021 08:14)  T(F): 97.4 (12 Oct 2021 08:14), Max: 97.4 (12 Oct 2021 08:14)  HR: 102 (12 Oct 2021 08:14) (102 - 140)  BP: 112/77 (12 Oct 2021 08:14) (112/77 - 119/85)  BP(mean): --  RR: 16 (12 Oct 2021 08:14) (16 - 16)  SpO2: --
Vital Signs Last 24 Hrs  T(C): 35.7 (11 Oct 2021 08:09), Max: 36.8 (10 Oct 2021 15:50)  T(F): 96.3 (11 Oct 2021 08:09), Max: 98.2 (10 Oct 2021 15:50)  HR: 86 (11 Oct 2021 08:09) (86 - 94)  BP: 136/74 (11 Oct 2021 08:09) (117/75 - 136/74)  BP(mean): --  RR: 18 (11 Oct 2021 08:09) (18 - 18)  SpO2: --
Vital Signs Last 24 Hrs  T(C): 36.6 (07 Oct 2021 11:36), Max: 36.6 (07 Oct 2021 11:36)  T(F): 97.8 (07 Oct 2021 11:36), Max: 97.8 (07 Oct 2021 11:36)  HR: 80 (07 Oct 2021 11:36) (68 - 101)  BP: 105/66 (07 Oct 2021 11:36) (105/66 - 132/71)  BP(mean): --  RR: 18 (07 Oct 2021 11:36) (16 - 18)  SpO2: --
Vital Signs Last 24 Hrs  T(C): 36.7 (08 Oct 2021 09:52), Max: 36.7 (08 Oct 2021 09:52)  T(F): 98.1 (08 Oct 2021 09:52), Max: 98.1 (08 Oct 2021 09:52)  HR: 91 (08 Oct 2021 09:52) (80 - 91)  BP: 120/67 (08 Oct 2021 09:52) (105/59 - 120/67)  BP(mean): --  RR: 18 (08 Oct 2021 09:52) (18 - 18)  SpO2: --
Vital Signs Last 24 Hrs  T(C): 36.2 (06 Oct 2021 08:24), Max: 36.2 (06 Oct 2021 08:24)  T(F): 97.1 (06 Oct 2021 08:24), Max: 97.1 (06 Oct 2021 08:24)  HR: 85 (06 Oct 2021 08:24) (85 - 86)  BP: 105/57 (06 Oct 2021 08:24) (92/51 - 105/57)  BP(mean): --  RR: 18 (06 Oct 2021 08:24) (18 - 18)  SpO2: --
Vital Signs Last 24 Hrs  T(C): 36.9 (09 Oct 2021 15:58), Max: 36.9 (09 Oct 2021 15:58)  T(F): 98.5 (09 Oct 2021 15:58), Max: 98.5 (09 Oct 2021 15:58)  HR: 108 (09 Oct 2021 15:58) (75 - 108)  BP: 119/79 (09 Oct 2021 15:58) (87/49 - 119/79)  BP(mean): --  RR: 18 (09 Oct 2021 15:58) (18 - 18)  SpO2: --

## 2021-10-12 NOTE — MEDICAL STUDENT PROGRESS NOTE(EDUCATION) - SUBJECTIVE AND OBJECTIVE BOX
Patient is a 26 year old female who is unemployed, lives with mother, brother, and grandparents with a past psychiatric history of bipolar disorder and intellectual disability with multiple past inpatient admissions. She was BIBA for evaluation of possible suicidal ideation and AH. Patient admitted for CAH on 10/06/21 .    Today, Tayler is calm, cooperative, and smiling. She does not appear to be in acute distress. Patient denies any voice telling her to hurt herself or visual hallucination of a strange figure. She does not want to hurt herself and states she will try to refrain from doing so when she hears the voice. Her spirits have been up since her mom visited her and patient was able to attend a group session yesterday.     Current medication regimen:   - Buspirone 15 mg BID  - Lexapro 15 mg Daily  - Haldol 4 mg PO at 9PM  - Ativan 0.5mg po at 8am and 9PM  - Haldol 5 mg Q6 PO PRN and Benadryl 50 mg Q6 PO PRN for acute agitation/ aggression. Routine Reassessment

## 2021-10-12 NOTE — PROGRESS NOTE BEHAVIORAL HEALTH - SUMMARY
Patient is a 26 year old female who is unemployed, lives with mother, brother and grand parents with a past psychiatric history of Bipolar disorder and intellectual disability with multiple past inpatient admissions that was BIBA for evaluation of possible suicidal ideation and AH. Patient admitted for CAH on 10/06/21 .    On evaluation, patient is calm, cooperative, and smiling. Patient denies auditory and/or visual hallucinations telling her to hurt herself. Patient warrants continued hospitalization to ensure stability.    # Bipolar disorder, depressed with psychotic features   - haldol to Haldol 4mg  - Buspirone 15 mg BID  - Lexapro 15 mg Daily  - ativan 0.5mg po at 8am and 9PM  - Haldol 5 mg Q6 PO PRN and Benadryl 50 mg Q6 PO PRN for acute agitation/ aggression.

## 2021-10-12 NOTE — CHART NOTE - NSCHARTNOTEFT_GEN_A_CORE
Was called by RN because patient fell onto right ankle. Seen and evaluated patient.     Patient reports this afternoon she lost her footing and fell onto her right ankle. Now reports right ankle pain. Patient was able to ambulate afterwards. Denies syncope, light headedness, HA, neck pain, palpitations    On exam patient is in NAD. Sitting comfortably on her bed eating dinner.   Left ankle FROM, 5/5 strength. Sensation intact. 2+ pulses. No pain to palpation.   Pain on palpation over right foot and ankle. 5/5 strength. No swelling. Sensation intact. 2+ pulses. No broken skin.   No calf pain.     Plan:  Right foot and ankle XRays ordered  Ibuprofen 400 mg TID prn for pain ordered

## 2021-10-12 NOTE — DISCHARGE NOTE BEHAVIORAL HEALTH - NSBHDCRESPONSEFT_PSY_A_CORE
Pt has made significant progress over the course of hospitalization. With continuous psychotherapy from the treatment team and the medications, patient reports feeling better, reported alleviation of CAH and was no longer engaging in self harm behaviors. Patient additionally reported improvement of anxiety during stay. Thought process and insight improved. Pt was calm and cooperative and was in good behavioral control. Patient denied any suicidal or homicidal ideations. Patient denied any auditory or visual hallucinations. Pt was evaluated by treatment team, pt is stable for discharge and patient shows no imminent danger to self, others or property at this time. Pt understands and agrees with treatment plan and following up with outpatient. Psychoeducation provided regarding diagnosis, medications, treatment and follow up.

## 2021-10-12 NOTE — PROGRESS NOTE BEHAVIORAL HEALTH - NSBHPTASSESSDT_PSY_A_CORE
07-Oct-2021 12:07
08-Oct-2021 10:59
11-Oct-2021 10:48
10-Oct-2021 14:10
06-Oct-2021 11:49
12-Oct-2021 10:39
09-Oct-2021 21:46

## 2021-10-12 NOTE — PROGRESS NOTE BEHAVIORAL HEALTH - CASE SUMMARY
as noted
improvement as noted above.  Pt socializing with peers; no overt psychosis. Anticipate d/c tomorrow
Pt has no plans or actual desire to harm self.  States she wants to go home.  Will continue assessment and monitor
as noted
Chart reviewed; pt interviewed and case discussed with mother.  Recent stressor of changing psychiatrist noted. Per mother pt has no actual attempts to harm self, and says pt is highly suggestible, and believes someone in one of the pt's groups said something about going to the hospital last week.      will continue to assess.    Pt states she doesn't want to harm self; wants to go home
as noted

## 2021-10-12 NOTE — PROGRESS NOTE BEHAVIORAL HEALTH - NS ED BHA MSE GENERAL APPEARANCE
Deformities present

## 2021-10-12 NOTE — PROGRESS NOTE BEHAVIORAL HEALTH - MODIFICATIONS
pt reported improvement of sx with haldol; no desire to harm self, and is asking to go home soon
pt reported improvement of sx with haldol; no desire to harm self, and is asking to go home soon
seen and discussed with resident.  Pt notes voices telling her to hurt herself are less, and she is asking about going home.  She does not want to harm herself.  Will continue haldol titration as indicated
seen and discussed with resident
seen and discussed with resident
seen and discussed with resident.

## 2021-10-12 NOTE — DISCHARGE NOTE BEHAVIORAL HEALTH - NSBHDCMEDSFT_PSY_A_CORE
Patient presented to hospital on Lexapro 15mg po daily, Buspar 15mg PO BID, Abilify 10mg daily. Given CAH, Abilify was increased to 15mg po HS with little benefit as patient continued to report distress and found to continue to hit self. Given distress, patient was given Haldol 2mg po. Patient reported good response, reported improvement in CAH and showed less self harm behavior. Given limited response, dose was increased to haldol 4mg po qhs which demonstrated further improvement as patient was noted to no longer hear voice and was no longer engaging in self harm behaviors. ativan 0.5mg po at 8am and 9PM was given for anxiety to good effect.

## 2021-10-12 NOTE — PROGRESS NOTE BEHAVIORAL HEALTH - THOUGHT CONTENT
Preoccupations
Unremarkable
Preoccupations

## 2021-10-12 NOTE — DISCHARGE NOTE BEHAVIORAL HEALTH - NSBHDCSWCOMMENTSFT_PSY_A_CORE
Discharge summary to be faxed to [phone] Discharge summary faxed to Dr. Price @ [facility]  [phone] on 10/13 at 10am Discharge summary faxed to Dr. Price @ [facility]  [phone] on 10/13 at 10am

## 2021-10-12 NOTE — PROGRESS NOTE BEHAVIORAL HEALTH - SECONDARY DX1
Intellectual disability

## 2021-10-12 NOTE — MEDICAL STUDENT PROGRESS NOTE(EDUCATION) - NS MD HP STUD ASPLAN ASSES FT
Patient is a 26 year old female who is unemployed, lives with mother, brother, and grandparents with a past psychiatric history of bipolar disorder and intellectual disability with multiple past inpatient admissions. She was BIBA for evaluation of possible suicidal ideation and AH. Patient admitted for CAH on 10/06/21 .    On evaluation, patient is calm, cooperative, and smiling. Patient denies auditory and/or visual hallucinations telling her to hurt herself. She does not appear to be a danger to herself or others, but continued hospitalization warranted to ensure stability.

## 2021-10-13 VITALS
SYSTOLIC BLOOD PRESSURE: 124 MMHG | HEART RATE: 79 BPM | DIASTOLIC BLOOD PRESSURE: 82 MMHG | HEIGHT: 61 IN | TEMPERATURE: 97 F | RESPIRATION RATE: 18 BRPM | WEIGHT: 268.52 LBS

## 2021-10-13 PROCEDURE — 99238 HOSP IP/OBS DSCHRG MGMT 30/<: CPT

## 2021-10-13 RX ADMIN — Medication 400 MILLIGRAM(S): at 08:18

## 2021-10-13 RX ADMIN — Medication 0.5 MILLIGRAM(S): at 08:12

## 2021-10-13 RX ADMIN — Medication 15 MILLIGRAM(S): at 08:12

## 2021-10-13 RX ADMIN — ESCITALOPRAM OXALATE 15 MILLIGRAM(S): 10 TABLET, FILM COATED ORAL at 08:12

## 2021-10-13 NOTE — CHART NOTE - NSCHARTNOTEFT_GEN_A_CORE
Social Work Discharge Note:    Patient is for discharge today.   She is alert and oriented x3.  Mood has improved.  Anxiety has decreased.  Insight and judgment have improved.  Suicidal/homicidal ideation denied.     Patient will be discharged to her home in [place] with her family.  She will resume mental health treatment with her outpatient psychiatrist Dr. Price.    Communication with patients mother, Mrs. White [phone], occurred prior to discharge.  No safety concerns were verbalized at that time.     Patient and patients' mother are aware and agreeable to discharge today.

## 2021-10-13 NOTE — CHART NOTE - NSCHARTNOTEFT_GEN_A_CORE
Pt was d/c'd today to home in improved state, without s/h ideation or psychosis.  pt had no c/o related to unwitnessed fall yesterday; ankle x-ray negative.

## 2021-10-14 ENCOUNTER — NON-APPOINTMENT (OUTPATIENT)
Age: 26
End: 2021-10-14

## 2021-10-19 DIAGNOSIS — Z91.52 PERSONAL HISTORY OF NONSUICIDAL SELF-HARM: ICD-10-CM

## 2021-10-19 DIAGNOSIS — R45.851 SUICIDAL IDEATIONS: ICD-10-CM

## 2021-10-19 DIAGNOSIS — G47.33 OBSTRUCTIVE SLEEP APNEA (ADULT) (PEDIATRIC): ICD-10-CM

## 2021-10-19 DIAGNOSIS — E66.3 OVERWEIGHT: ICD-10-CM

## 2021-10-19 DIAGNOSIS — F31.5 BIPOLAR DISORDER, CURRENT EPISODE DEPRESSED, SEVERE, WITH PSYCHOTIC FEATURES: ICD-10-CM

## 2021-10-19 DIAGNOSIS — F79 UNSPECIFIED INTELLECTUAL DISABILITIES: ICD-10-CM

## 2021-10-19 DIAGNOSIS — F84.0 AUTISTIC DISORDER: ICD-10-CM

## 2021-10-19 DIAGNOSIS — Z91.81 HISTORY OF FALLING: ICD-10-CM

## 2021-10-19 DIAGNOSIS — Z99.89 DEPENDENCE ON OTHER ENABLING MACHINES AND DEVICES: ICD-10-CM

## 2021-10-21 ENCOUNTER — APPOINTMENT (OUTPATIENT)
Dept: FAMILY MEDICINE | Facility: CLINIC | Age: 26
End: 2021-10-21
Payer: MEDICAID

## 2021-10-21 VITALS
HEART RATE: 80 BPM | SYSTOLIC BLOOD PRESSURE: 120 MMHG | DIASTOLIC BLOOD PRESSURE: 72 MMHG | HEIGHT: 61 IN | BODY MASS INDEX: 51.73 KG/M2 | OXYGEN SATURATION: 98 % | TEMPERATURE: 97.8 F | WEIGHT: 274 LBS

## 2021-10-21 PROCEDURE — 99495 TRANSJ CARE MGMT MOD F2F 14D: CPT

## 2021-10-21 RX ORDER — ZIPRASIDONE 40 MG/1
40 CAPSULE ORAL TWICE DAILY
Refills: 0 | Status: COMPLETED | COMMUNITY
Start: 2020-11-07 | End: 2021-10-21

## 2021-10-21 NOTE — ASSESSMENT
[FreeTextEntry1] : schizoaffective d/o, bipolar d/o- cont haldol 2mg bid. monitor for akathisia\par wart - performed cryofreeze treatment on lesion after consent given after discussion of risks/benefits of tx. Discussed aftercare w/ pt and mother. if no resolution pt may need add'l treatments. pt tolerated procedure well.\par Pt has hx intellectual disability, stable

## 2021-10-21 NOTE — HISTORY OF PRESENT ILLNESS
[Post-hospitalization from ___ Hospital] : Post-hospitalization from [unfilled] Hospital [Admitted on: ___] : The patient was admitted on [unfilled] [Discharged on ___] : discharged on [unfilled] [Discharge Summary] : discharge summary [Discharge Med List] : discharge medication list [Med Reconciliation] : medication reconciliation has been completed [Patient Contacted By: ____] : and contacted by [unfilled] [FreeTextEntry2] : Pt admitted to [facility] 9/30-10/5/21 for "hearing voices telling her to hurt herself," and was stransferred to [facility] 10/5-10/13 due to lack of psych meds. Pt was switched to haldol 4mg and geodon was d/c'ed. Pt has followed up with psych Dr. shawn Alegria on 10/19/21. Pt was getting "giddy" with 4mg qhs so was switched to 2mg q AM and 2mg q PM. Pt reports decreased frequency of voices since switching to haldol. \par \par Pt would like to designate her mother as her health care proxy as her mother was not able to help her make decisions during her stay at [facility]. Pt is full code. Pt has intellectual disability, stable. Pt is AOx3.

## 2021-10-21 NOTE — PHYSICAL EXAM
[Normal] : normal rate, regular rhythm, normal S1 and S2 and no murmur heard [Normal Affect] : the affect was normal [84764 - Moderate Complexity requires multiple possible diagnoses and/or the management options, moderate complexity of the medical data (tests, etc.) to be reviewed, and moderate risk of significant complications, morbidity, and/or mortality as well as co] : Moderate Complexity [de-identified] : 0.5cm x 0.4cm warty lesion on R dorsum hand

## 2021-10-21 NOTE — HEALTH RISK ASSESSMENT
[With Patient/Caregiver] : , with patient/caregiver [Reviewed updated] : Reviewed, updated [Designated Healthcare Proxy] : Designated healthcare proxy [Name: ___] : Health Care Proxy's Name: [unfilled]  [Relationship: ___] : Relationship: [unfilled] [Aggressive treatment] : aggressive treatment [AdvancecareDate] : 10/21

## 2021-10-22 RX ORDER — FLUTICASONE FUROATE AND VILANTEROL TRIFENATATE 200; 25 UG/1; UG/1
200-25 POWDER RESPIRATORY (INHALATION)
Qty: 3 | Refills: 1 | Status: DISCONTINUED | COMMUNITY
Start: 2021-02-10 | End: 2021-10-22

## 2021-10-29 ENCOUNTER — TRANSCRIPTION ENCOUNTER (OUTPATIENT)
Age: 26
End: 2021-10-29

## 2021-12-06 ENCOUNTER — RX RENEWAL (OUTPATIENT)
Age: 26
End: 2021-12-06

## 2022-01-01 NOTE — ASSESSMENT
Problem: Infant Inpatient Plan of Care  Goal: Plan of Care Review  Outcome: Ongoing, Progressing  Goal: Patient-Specific Goal (Individualized)  Outcome: Ongoing, Progressing  Goal: Absence of Hospital-Acquired Illness or Injury  Outcome: Ongoing, Progressing  Goal: Optimal Comfort and Wellbeing  Outcome: Ongoing, Progressing  Goal: Readiness for Transition of Care  Outcome: Ongoing, Progressing     Problem: Hypoglycemia ([place])  Goal: Glucose Stability  Outcome: Ongoing, Progressing     Problem: Infection ([place])  Goal: Absence of Infection Signs and Symptoms  Outcome: Ongoing, Progressing     Problem: Oral Nutrition ()  Goal: Effective Oral Intake  Outcome: Ongoing, Progressing     Problem: Infant-Parent Attachment ()  Goal: Demonstration of Attachment Behaviors  Outcome: Ongoing, Progressing     Problem: Pain ()  Goal: Acceptable Level of Comfort and Activity  Outcome: Ongoing, Progressing     Problem: Respiratory Compromise ([place])  Goal: Effective Oxygenation and Ventilation  Outcome: Ongoing, Progressing     Problem: Skin Injury ([place])  Goal: Skin Health and Integrity  Outcome: Ongoing, Progressing     Problem: Temperature Instability ([place])  Goal: Temperature Stability  Outcome: Ongoing, Progressing      [FreeTextEntry1] : c/w Ibuprofen for pain control\par apply heat to affected area\par trial of Zofran for nausea\par maintain water hydration\par soft diet, advance as tolerated\par advised pain may be GYN in origin as she is due to have her period soon, suggest follow up with GYN\par follow up with GI\par go to ER if pain becomes severe

## 2022-01-05 ENCOUNTER — APPOINTMENT (OUTPATIENT)
Dept: PULMONOLOGY | Facility: CLINIC | Age: 27
End: 2022-01-05
Payer: MEDICAID

## 2022-01-05 VITALS — HEIGHT: 61 IN | BODY MASS INDEX: 52.87 KG/M2 | WEIGHT: 280 LBS

## 2022-01-05 VITALS
OXYGEN SATURATION: 98 % | SYSTOLIC BLOOD PRESSURE: 122 MMHG | HEART RATE: 100 BPM | RESPIRATION RATE: 16 BRPM | DIASTOLIC BLOOD PRESSURE: 80 MMHG

## 2022-01-05 DIAGNOSIS — R06.2 WHEEZING: ICD-10-CM

## 2022-01-05 DIAGNOSIS — R06.00 DYSPNEA, UNSPECIFIED: ICD-10-CM

## 2022-01-05 PROCEDURE — 94010 BREATHING CAPACITY TEST: CPT

## 2022-01-05 PROCEDURE — 99214 OFFICE O/P EST MOD 30 MIN: CPT | Mod: 25

## 2022-01-10 ENCOUNTER — APPOINTMENT (OUTPATIENT)
Dept: ORTHOPEDIC SURGERY | Facility: CLINIC | Age: 27
End: 2022-01-10
Payer: MEDICAID

## 2022-01-10 PROCEDURE — 99213 OFFICE O/P EST LOW 20 MIN: CPT

## 2022-01-10 NOTE — DISCUSSION/SUMMARY
[de-identified] : Today I had a lengthy discussion with the patient regarding their right ankle pain. I have addressed all the patient's concerns surrounding the pathology of their condition. I recommend the patient undergo a course of physical therapy for the right ankle 2-3 times a week for a total of 6-8 weeks. A prescription was given for the physical therapy today. I advised the patient to utilize gel cup inserts in the heels of their shoes, as well as an OTC dorsal hybrid night splint to facilitate stretching in the evening. The patient understood and verbally agreed to the treatment plan. All of their questions were answered and they were satisfied with the visit. The patient should call the office if they have any questions or experience worsening symptoms. I would like to see the patient back in the office in PRN to reassess their condition. 				\par

## 2022-01-10 NOTE — HISTORY OF PRESENT ILLNESS
[FreeTextEntry1] : 1/10/2022: The patient is a 26 year old female presenting for a follow-up evaluation of right foot/ankle pain. Pain is localized to the posterior aspect of her ankle to the Achilles. She was recently hospitalized at [facility] for Depression due to her CPAP machine. She is requesting a new prescription for PT in the office today. She is wearing sketchers without any assistance devices. No other complaints. \par \par 9/29/2021: JUNO SEGOVIA is a 26 year old female presenting for a follow-up evaluation of right foot and right ankle pain. The patient’s pain is noted to be the same as her last evaluation, with no improvement. She is here at the clinic to review MRI results. She has been utilizing ibuprofen with minimal relief. No other complaints. 	\par \par 8/26/2021: JUNO SEGOVIA is a 26 year old female who presents for follow-up evaluation of right foot, right ankle. She has been utilizing the short CAM boot. She has not obtained her MRI yet. She has been taking Advil with minimal relief. \par \par 8/19/21: The patient returns wearing a short cam boot on her right foot and right ankle.  The patient states that last Thursday she had increased pain in the right foot.  Since then she has burning pains in the right foot that has not subsided and has gotten worse.  She is weightbearing as tolerated with the short cam boot and placed in a short cam boot on last week due to her pain in the foot.  She had no trauma to the foot.  Her pain scale in the foot is a 7 out of 10.\par \par 7/29/21: JUNO SEGOVIA is a 26 year old female who presents for initial evaluation of bilateral ankle pain. This is her 4th opinion, was told by 2 DrPetr  that she needs surgery.

## 2022-01-10 NOTE — ADDENDUM
[FreeTextEntry1] : I, Aranza Del Rio, acted solely as a scribe for Dr. Monster Hagan on this date 01/10/2022.\par \par All medical record entries made by the Scribe were at my, Dr. Monster Hagan, direction and personally dictated by me on 01/10/2022 . I have reviewed the chart and agree that the record accurately reflects my personal performance of the history, physical exam, assessment and plan. I have also personally directed, reviewed, and agreed with the chart.	\par

## 2022-01-10 NOTE — PHYSICAL EXAM
[de-identified] : General: Alert and oriented x3. In no acute distress. Pleasant in nature with normal affect. No apparent respiratory distress. \par \par Right Ankle Exam.\par Skin: Clean, dry, intact\par Inspection: No obvious malalignment, mild swelling, no effusion; no lymphadenopathy\par Pulses: 2+ DP/PT pulses\par ROM: Bilateral Ankle 10 degrees dorsiflexion, 40 degrees plantarflexion, 10 degrees of subtalar motion\par Tenderness: + medial and lateral joint line tenderness. No tenderness over the lateral malleolus, no CFL/ATFL/PTFL pain. No medial malleolus pain, no deltoid ligament pain. No proximal fibular pain. No heel pain.\par Stability: Negative anterior/posterior drawer.\par Strength: 5/5 TA/GS/EHL\par Neuro: In tact to light touch throughout\par Additional Tests: Negative Mortons test, negative tarsal tunnel tinels, negative single heel raise [de-identified] : No new imaging.

## 2022-01-22 ENCOUNTER — APPOINTMENT (OUTPATIENT)
Age: 27
End: 2022-01-22

## 2022-02-10 ENCOUNTER — APPOINTMENT (OUTPATIENT)
Dept: FAMILY MEDICINE | Facility: CLINIC | Age: 27
End: 2022-02-10
Payer: MEDICAID

## 2022-02-10 VITALS
OXYGEN SATURATION: 98 % | TEMPERATURE: 97.2 F | DIASTOLIC BLOOD PRESSURE: 82 MMHG | WEIGHT: 280 LBS | HEART RATE: 102 BPM | SYSTOLIC BLOOD PRESSURE: 128 MMHG | BODY MASS INDEX: 52.87 KG/M2 | HEIGHT: 61 IN

## 2022-02-10 PROCEDURE — 69209 REMOVE IMPACTED EAR WAX UNI: CPT

## 2022-02-10 PROCEDURE — 99213 OFFICE O/P EST LOW 20 MIN: CPT | Mod: 25

## 2022-02-10 RX ORDER — SERTRALINE HYDROCHLORIDE 25 MG/1
TABLET, FILM COATED ORAL
Refills: 0 | Status: COMPLETED | COMMUNITY
End: 2022-02-10

## 2022-02-10 NOTE — ASSESSMENT
[FreeTextEntry1] : Left cerumen disimpaction performed using cerumen spoon and water and hydrogen peroxide lavage with patient's informed consent after discussion of possible risks and benefits. Pt tolerated well. Tympanic membrane intact after procedure.

## 2022-04-08 ENCOUNTER — TRANSCRIPTION ENCOUNTER (OUTPATIENT)
Age: 27
End: 2022-04-08

## 2022-04-08 RX ORDER — ESCITALOPRAM OXALATE 10 MG/1
1 TABLET, FILM COATED ORAL
Qty: 0 | Refills: 0 | DISCHARGE

## 2022-04-08 RX ORDER — ARIPIPRAZOLE 15 MG/1
1 TABLET ORAL
Qty: 0 | Refills: 0 | DISCHARGE

## 2022-04-08 RX ORDER — ALPRAZOLAM 0.25 MG
1 TABLET ORAL
Qty: 0 | Refills: 0 | DISCHARGE

## 2022-04-09 ENCOUNTER — OUTPATIENT (OUTPATIENT)
Dept: OUTPATIENT SERVICES | Facility: HOSPITAL | Age: 27
LOS: 1 days | End: 2022-04-09
Payer: COMMERCIAL

## 2022-04-09 ENCOUNTER — APPOINTMENT (OUTPATIENT)
Dept: DISASTER EMERGENCY | Facility: HOSPITAL | Age: 27
End: 2022-04-09

## 2022-04-09 VITALS
TEMPERATURE: 98 F | OXYGEN SATURATION: 98 % | SYSTOLIC BLOOD PRESSURE: 139 MMHG | WEIGHT: 279.11 LBS | RESPIRATION RATE: 17 BRPM | HEART RATE: 110 BPM | DIASTOLIC BLOOD PRESSURE: 94 MMHG

## 2022-04-09 VITALS
OXYGEN SATURATION: 98 % | DIASTOLIC BLOOD PRESSURE: 86 MMHG | RESPIRATION RATE: 17 BRPM | HEART RATE: 106 BPM | TEMPERATURE: 98 F | SYSTOLIC BLOOD PRESSURE: 134 MMHG

## 2022-04-09 DIAGNOSIS — Z98.890 OTHER SPECIFIED POSTPROCEDURAL STATES: Chronic | ICD-10-CM

## 2022-04-09 DIAGNOSIS — G93.2 BENIGN INTRACRANIAL HYPERTENSION: Chronic | ICD-10-CM

## 2022-04-09 DIAGNOSIS — U07.1 COVID-19: ICD-10-CM

## 2022-04-09 PROCEDURE — M0222: CPT

## 2022-04-09 RX ORDER — BEBTELOVIMAB 87.5 MG/ML
175 INJECTION, SOLUTION INTRAVENOUS ONCE
Refills: 0 | Status: COMPLETED | OUTPATIENT
Start: 2022-04-09 | End: 2022-04-09

## 2022-04-09 RX ADMIN — BEBTELOVIMAB 175 MILLIGRAM(S): 87.5 INJECTION, SOLUTION INTRAVENOUS at 08:19

## 2022-04-09 NOTE — MONOCLONAL ANTIBODY INFUSION - ASSESSMENT AND PLAN
CC: Monoclonal Antibody Infusion/COVID 19 Positive  26y Female with hx of obesity, anxiety/depression, Pseudotumor cerebri, developmental delay, and recent dx of COVID 19+ who presents today for elective Bebtelovimab. Patient has been screened and was deemed to be a candidate.    Symptoms/ Criteria  Date of Symptom Onset: 4/7/22  Symptoms: cough, chills, fever   Date of Positive COVID PCR: 4/8/22  Risk Profile includes:   unvaccinated, obesity     Vaccination Status: UNVACCINATED    PMHx:  Infection due to severe acute respiratory syndrome coronavirus 2 (SARS-CoV-2)    ASSESSMENT:  Pt is COVID positive and symptomatic who was referred for Bebtelovimab monoclonal antibody treatment.    PLAN:  - MAB treatment explained to patient. I have reviewed the Bebtelovimab Emergency Use Authorization (EUA).   - Consent for MAB obtained.   - Risk & benefits discussed. Patient verbalized understanding of plan and agrees to treatment. All questions answered.  - 175mg of Bebtelovimab administered as a single intravenous injection over at least 30 seconds.   - Observe patient for one hour post medication administration and then if stable, discharge home with oupatient follow up as planned by PCP.    POST ASSESSMENT:   Patient completed MAB, and monitored x 1 hour post-infusion with no adverse reactions noted, remained hemodynamically stable.  - Patient tolerated injection well; denies complaints of chest pain/SOB/dizziness/palpitations.   - VSS for discharge home.  - D/C instructions given/ fact sheet included.  - Patient was instructed to self-isolate and use infection control measures (e.g wear mask, isolate, social distance, avoid sharing personal items, clean and disinfect "high touch" surfaces, and frequent handwashing according to the CDC guidelines.   - The patient was informed on what symptoms to be aware of for the next couple of days, and if there are any issues to call the 24/7 clinical call center. Patient was instructed to follow up with primary care provider as needed.    DISCHARGE at ~9:15AM.

## 2022-04-09 NOTE — MONOCLONAL ANTIBODY INFUSION - HOME MEDICATIONS
haloperidol 2 mg oral tablet , 2 tab(s) orally once a day (at bedtime), until stopped by MD   escitalopram 5 mg oral tablet , 3 tab(s) orally once a day, until stopped by MD  BuSpar Dividose 15 mg oral tablet , 1 tab(s) orally 2 times a day, until stopped by MD  Xanax 0.25 mg oral tablet , 1 tab(s) orally 4 times a day, As Needed for anxiety, until stopped by MD  cephalexin 500 mg oral tablet , 1 tab(s) orally 2 times a day   oxyCODONE-acetaminophen 5 mg-325 mg oral tablet , 1 tab(s) orally every 4 hours, As needed, Moderate Pain (4 - 6) MDD:6 tabs  Zoloft 100 mg oral tablet , 1 tab(s) orally once a day  multivitamin , 1 tab(s) orally once a day. last dose 8/29/2018  famotidine 20 mg oral tablet , 1 tab(s) orally 2 times a day  ALPRAZolam 0.25 mg oral tablet , orally 2 times a day  Geodon 60 mg oral capsule , 1 cap(s) orally 2 times a day

## 2022-04-09 NOTE — MONOCLONAL ANTIBODY INFUSION - EXAM
Exam/findings:      PE:   Appearance: NAD	  HEENT:  NC/AT  Cardiovascular:  No edema  Respiratory: no use of accessory muscles  Gastrointestinal:  non-distended   Skin: warm and dry  Neurologic: Non-focal  Extremities: Normal range of motion   Exam/findings:    Vital Signs Last 24 Hrs  T(C): 36.7 (09 Apr 2022 08:00), Max: 36.7 (09 Apr 2022 08:00)  T(F): 98 (09 Apr 2022 08:00), Max: 98 (09 Apr 2022 08:00)  HR: 110 (09 Apr 2022 08:00) (110 - 110)  BP: 139/94 (09 Apr 2022 08:00) (139/94 - 139/94)  RR: 17 (09 Apr 2022 08:00) (17 - 17)  SpO2: 98% (09 Apr 2022 08:00) (98% - 98%)    PE:   Appearance: NAD	  HEENT:  NC/AT  Cardiovascular:  No edema  Respiratory: no use of accessory muscles  Gastrointestinal:  non-distended   Skin: warm and dry  Neurologic: Non-focal  Extremities: Normal range of motion

## 2022-05-04 ENCOUNTER — APPOINTMENT (OUTPATIENT)
Dept: PULMONOLOGY | Facility: CLINIC | Age: 27
End: 2022-05-04
Payer: MEDICAID

## 2022-05-04 VITALS
HEIGHT: 61 IN | HEART RATE: 130 BPM | SYSTOLIC BLOOD PRESSURE: 132 MMHG | RESPIRATION RATE: 18 BRPM | DIASTOLIC BLOOD PRESSURE: 88 MMHG | OXYGEN SATURATION: 97 % | WEIGHT: 286 LBS | BODY MASS INDEX: 54 KG/M2

## 2022-05-04 PROCEDURE — 99214 OFFICE O/P EST MOD 30 MIN: CPT

## 2022-06-04 ENCOUNTER — APPOINTMENT (OUTPATIENT)
Age: 27
End: 2022-06-04

## 2022-06-22 ENCOUNTER — APPOINTMENT (OUTPATIENT)
Dept: ORTHOPEDIC SURGERY | Facility: CLINIC | Age: 27
End: 2022-06-22
Payer: MEDICAID

## 2022-06-22 DIAGNOSIS — S99.921A UNSPECIFIED INJURY OF RIGHT FOOT, INITIAL ENCOUNTER: ICD-10-CM

## 2022-06-22 DIAGNOSIS — M79.671 PAIN IN RIGHT FOOT: ICD-10-CM

## 2022-06-22 DIAGNOSIS — S93.491D SPRAIN OF OTHER LIGAMENT OF RIGHT ANKLE, SUBSEQUENT ENCOUNTER: ICD-10-CM

## 2022-06-22 PROCEDURE — 99213 OFFICE O/P EST LOW 20 MIN: CPT

## 2022-06-22 NOTE — HISTORY OF PRESENT ILLNESS
[FreeTextEntry1] : 6/22/2022: The patient is a 26 year old female presenting for a follow-up evaluation of right ankle pain. She is here today to worsened pain in the right ankle. Pain is localized to the anterior aspect of her ankle. No new trauma or recent injuries. She is not currently in PT. She is wearing sketchers and is walking without assistance. No other complaints.

## 2022-06-22 NOTE — PHYSICAL EXAM
[de-identified] : General: Alert and oriented x3. In no acute distress. Pleasant in nature with a normal affect. No apparent respiratory distress.\par \par Right Ankle Exam\par Skin: Clean, dry, intact\par Inspection: No obvious malalignment, no swelling, no effusion; no lymphadenopathy\par Pulses: 2+ DP/PT pulses\par ROM: 10 degrees of dorsiflexion, 40 degrees of plantarflexion, 10 degrees of subtalar motion\par Tenderness: Anterior ankle pain. Medial ankle pain. No tenderness over the lateral malleolus, no CFL/ATFL/PTFL pain. + medial malleolus pain, no deltoid ligament pain. No proximal fibular pain. No heel pain.\par Stability: Negative anterior/posterior drawer.\par Strength: 5/5 TA/GS/EHL\par Neuro: In tact to light touch throughout\par Additional tests: Negative Mortons test, Negative syndesmosis squeeze test. [de-identified] : No new imaging was performed.

## 2022-06-22 NOTE — DISCUSSION/SUMMARY
[de-identified] : Today I had a lengthy discussion with the patient regarding their right ankle pain. I have addressed all the patient's concerns surrounding the pathology of their condition. I recommend the patient undergo a course of physical therapy for the right ankle 2-3 times a week for a total of 6-8 weeks. A prescription was given for the physical therapy today. I recommend that the patient utilize meloxicam with food once per day as instructed. A prescription for the meloxicam was ordered for the patient in the office today.	 The patient understood and verbally agreed to the treatment plan. All of their questions were answered and they were satisfied with the visit. The patient should call the office if they have any questions or experience worsening symptoms. I would like to see the patient back in the office in 2-3 months PRN to reassess their condition. 				\par

## 2022-06-22 NOTE — ADDENDUM
[FreeTextEntry1] : I, Aranza Del Rio, acted solely as a scribe for Dr. Monster Hagan on this date 06/22/2022.\par \par All medical record entries made by the Scribe were at my, Dr. Monster Hagan, direction and personally dictated by me on 06/22/2022 . I have reviewed the chart and agree that the record accurately reflects my personal performance of the history, physical exam, assessment and plan. I have also personally directed, reviewed, and agreed with the chart.	\par

## 2022-06-23 ENCOUNTER — APPOINTMENT (OUTPATIENT)
Dept: FAMILY MEDICINE | Facility: CLINIC | Age: 27
End: 2022-06-23
Payer: MEDICAID

## 2022-06-23 VITALS
TEMPERATURE: 97.6 F | SYSTOLIC BLOOD PRESSURE: 128 MMHG | HEIGHT: 61 IN | WEIGHT: 277 LBS | HEART RATE: 125 BPM | BODY MASS INDEX: 52.3 KG/M2 | OXYGEN SATURATION: 98 % | DIASTOLIC BLOOD PRESSURE: 72 MMHG

## 2022-06-23 DIAGNOSIS — Z13.220 ENCOUNTER FOR SCREENING FOR LIPOID DISORDERS: ICD-10-CM

## 2022-06-23 DIAGNOSIS — E66.01 MORBID (SEVERE) OBESITY DUE TO EXCESS CALORIES: ICD-10-CM

## 2022-06-23 DIAGNOSIS — F25.1 SCHIZOAFFECTIVE DISORDER, DEPRESSIVE TYPE: ICD-10-CM

## 2022-06-23 DIAGNOSIS — R73.03 PREDIABETES.: ICD-10-CM

## 2022-06-23 PROCEDURE — 69209 REMOVE IMPACTED EAR WAX UNI: CPT

## 2022-06-23 PROCEDURE — 99213 OFFICE O/P EST LOW 20 MIN: CPT | Mod: 25

## 2022-06-23 PROCEDURE — 99395 PREV VISIT EST AGE 18-39: CPT | Mod: 25

## 2022-06-23 RX ORDER — IBUPROFEN 600 MG/1
600 TABLET, FILM COATED ORAL
Qty: 15 | Refills: 0 | Status: COMPLETED | COMMUNITY
Start: 2021-07-21 | End: 2022-06-23

## 2022-06-23 RX ORDER — VENLAFAXINE HYDROCHLORIDE 37.5 MG/1
37.5 CAPSULE, EXTENDED RELEASE ORAL
Qty: 30 | Refills: 0 | Status: COMPLETED | COMMUNITY
Start: 2021-07-14 | End: 2022-06-23

## 2022-06-23 RX ORDER — CHLORHEXIDINE GLUCONATE, 0.12% ORAL RINSE 1.2 MG/ML
0.12 SOLUTION DENTAL
Qty: 473 | Refills: 0 | Status: COMPLETED | COMMUNITY
Start: 2021-07-21 | End: 2022-06-23

## 2022-06-23 RX ORDER — ESCITALOPRAM OXALATE 5 MG/1
TABLET, FILM COATED ORAL
Refills: 0 | Status: COMPLETED | COMMUNITY
End: 2022-06-23

## 2022-06-23 NOTE — HISTORY OF PRESENT ILLNESS
Assessment    1  Acute URI (465 9) (J06 9)    Plan  tylenol and motrin for fever  mucinex D otc  if not improved in 7 days see pcp  Chief Complaint  Chief Complaint Free Text Note Form: cough, congestion, sore throat, nausea and headache since Saturday  Afebrile  Denies vomiting  Tried Mucinex with no relief  History of Present Illness  HPI: 34 yom presents to clinic complaining of fatigue, sore throat and fever x3 days  He reports not visiting a PCP for 5 years  He reports headache x1 day around the occiput and a productive cough x1 day with yellow mucus  He has tried NyQuil and Mucinex with no relief  He reports nausea but denies vomiting or diarrhea  He denies nasal congestion, rhinorrhea, SOB or chest tightness  He denies history of allergies, asthma, or sick contacts  Hospital Based Practices Required Assessment:  Pain Assessment  the patient states they do not have pain  (on a scale of 0 to 10, the patient rates the pain at 0 )  Abuse And Domestic Violence Screen   Yes, the patient is safe at home  -- The patient states no one is hurting them  Depression And Suicide Screen  No, the patient has not had thoughts of hurting themself  No, the patient has not felt depressed in the past 7 days  Prefered Language is  english  Primary Language is  english  Readiness To Learn: Receptive  Barriers To Learning: none  Preferred Learning: verbal      Family History  Mother    1  No pertinent family history  Father    2  No pertinent family history    Social History   · Alcohol ingestion of one to four drinks per week (V69 8) (Z78 9)   · Never a smoker    Surgical History  1  Denied: History Of Prior Surgery    Current Meds   1  No Reported Medications Recorded    Allergies    1   No Known Drug Allergies    Vitals  Signs   Recorded: [id number] 09:29AM   Temperature: 97 F  Heart Rate: 80  Respiration: 16  Systolic: 855  Diastolic: 90  Height: 5 ft 10 in  Weight: 210 lb   BMI Calculated: 30 13  BSA Calculated: 2 13  O2 Saturation: 97  Pain Scale: 0    Physical Exam   Constitutional  General appearance: No acute distress, well appearing and well nourished  Eyes  Conjunctiva and lids: No swelling, erythema, or discharge  Pupils and irises: Equal, round and reactive to light  Ears, Nose, Mouth, and Throat  External inspection of ears and nose: Normal    Otoscopic examination: Tympanic membrance translucent with normal light reflex  Canals patent without erythema  Nasal mucosa, septum, and turbinates: Normal without edema or erythema  Oropharynx: Abnormal  -- postnasal drip and cobblestoning  Pulmonary  Respiratory effort: No increased work of breathing or signs of respiratory distress  Auscultation of lungs: Clear to auscultation  Cardiovascular  Palpation of heart: Normal PMI, no thrills  Auscultation of heart: Normal rate and rhythm, normal S1 and S2, without murmurs  Examination of extremities for edema and/or varicosities: Normal    Abdomen  Abdomen: Non-tender, no masses  Liver and spleen: No hepatomegaly or splenomegaly  Lymphatic  Palpation of lymph nodes in neck: No lymphadenopathy  Skin  Skin and subcutaneous tissue: Normal without rashes or lesions  Message  Return to work or school:   Citlali Moore is under my professional care  He was seen in my office on 11/13/17   He is able to return to work on  11/14      excuse due to illness        Signatures   Electronically signed by : Melani Hickman, [facility]; Nov 13 2017 10:29AM EST                       (Author)    Electronically signed by : SHAD Perry ; Nov 13 2017  5:22PM EST                       (Co-author) [de-identified] : Pt in office for CPE. Pt has been feeling well. Denies CP, palpitations, dyspnea, n/v.\par Pt has hx of intellectual disability, she is a poor historian.\par Pt c/o R ear clogged/discomfort for several days.\par \par Has hx schizoaffective d/o, depressive type- follows with psychiatry, currently on haldol, lexapro, buspar, benztropine. \par \par Pt has hx migraines, follows with neurology Dr Feliciano, controlled on topamax\par Pt is morbidly obese has recently been trying to exercise regularly and eat less portions\par Pt has CECILIA, pt reports she is trying to use cpap\par \par Pt on norethindrone for heavy menses, sees gyn\par \par Nonsmoker \par ETOH use denies\par Drug use denies\par Exercises regularly biking daily 30-45 min\par Diet balanced but high carb/portions\par Single\par Discussed COVID vaccination

## 2022-06-23 NOTE — ASSESSMENT
[FreeTextEntry1] : R cerumen disimpaction performed using cerumen spoon and water and hydrogen peroxide lavage with patient's informed consent after discussion of possible risks and benefits. Pt tolerated well. Tympanic membrane intact after procedure. \par morbid obesity - Advised lifestyle modifications for wt loss. Healthy diet and regular exercise regimen discussed w/ pt\par schizoaffective d/o - cont meds f/u with \par predm - check a1c, advised wt loss\par migraines - cont topamax, f/u with neuro\par

## 2022-06-24 LAB
ALBUMIN SERPL ELPH-MCNC: 4.4 G/DL
ALP BLD-CCNC: 92 U/L
ALT SERPL-CCNC: 13 U/L
ANION GAP SERPL CALC-SCNC: 13 MMOL/L
AST SERPL-CCNC: 10 U/L
BASOPHILS # BLD AUTO: 0.04 K/UL
BASOPHILS NFR BLD AUTO: 0.3 %
BILIRUB SERPL-MCNC: 0.4 MG/DL
BUN SERPL-MCNC: 9 MG/DL
CALCIUM SERPL-MCNC: 9.4 MG/DL
CHLORIDE SERPL-SCNC: 103 MMOL/L
CHOLEST SERPL-MCNC: 159 MG/DL
CO2 SERPL-SCNC: 23 MMOL/L
CREAT SERPL-MCNC: 0.78 MG/DL
EGFR: 109 ML/MIN/1.73M2
EOSINOPHIL # BLD AUTO: 0.2 K/UL
EOSINOPHIL NFR BLD AUTO: 1.7 %
ESTIMATED AVERAGE GLUCOSE: 120 MG/DL
GLUCOSE SERPL-MCNC: 100 MG/DL
HBA1C MFR BLD HPLC: 5.8 %
HCT VFR BLD CALC: 36.9 %
HDLC SERPL-MCNC: 37 MG/DL
HGB BLD-MCNC: 10.5 G/DL
IMM GRANULOCYTES NFR BLD AUTO: 0.4 %
LDLC SERPL CALC-MCNC: 97 MG/DL
LYMPHOCYTES # BLD AUTO: 2.67 K/UL
LYMPHOCYTES NFR BLD AUTO: 22.6 %
MAN DIFF?: NORMAL
MCHC RBC-ENTMCNC: 22.2 PG
MCHC RBC-ENTMCNC: 28.5 GM/DL
MCV RBC AUTO: 78 FL
MONOCYTES # BLD AUTO: 0.57 K/UL
MONOCYTES NFR BLD AUTO: 4.8 %
NEUTROPHILS # BLD AUTO: 8.26 K/UL
NEUTROPHILS NFR BLD AUTO: 70.2 %
NONHDLC SERPL-MCNC: 121 MG/DL
PLATELET # BLD AUTO: 447 K/UL
POTASSIUM SERPL-SCNC: 4.4 MMOL/L
PROT SERPL-MCNC: 6.8 G/DL
RBC # BLD: 4.73 M/UL
RBC # FLD: 16.1 %
SODIUM SERPL-SCNC: 139 MMOL/L
T4 FREE SERPL-MCNC: 1.2 NG/DL
TRIGL SERPL-MCNC: 120 MG/DL
TSH SERPL-ACNC: 1.11 UIU/ML
WBC # FLD AUTO: 11.79 K/UL

## 2022-07-23 ENCOUNTER — NON-APPOINTMENT (OUTPATIENT)
Age: 27
End: 2022-07-23

## 2022-08-02 ENCOUNTER — NON-APPOINTMENT (OUTPATIENT)
Age: 27
End: 2022-08-02

## 2022-08-04 ENCOUNTER — APPOINTMENT (OUTPATIENT)
Dept: PULMONOLOGY | Facility: CLINIC | Age: 27
End: 2022-08-04

## 2022-08-08 ENCOUNTER — NON-APPOINTMENT (OUTPATIENT)
Age: 27
End: 2022-08-08

## 2022-08-15 ENCOUNTER — APPOINTMENT (OUTPATIENT)
Dept: FAMILY MEDICINE | Facility: CLINIC | Age: 27
End: 2022-08-15

## 2022-08-15 VITALS
HEART RATE: 127 BPM | SYSTOLIC BLOOD PRESSURE: 108 MMHG | TEMPERATURE: 98.3 F | DIASTOLIC BLOOD PRESSURE: 64 MMHG | OXYGEN SATURATION: 98 % | HEIGHT: 61 IN | BODY MASS INDEX: 52.15 KG/M2 | WEIGHT: 276.25 LBS

## 2022-08-15 DIAGNOSIS — H66.92 OTITIS MEDIA, UNSPECIFIED, LEFT EAR: ICD-10-CM

## 2022-08-15 PROCEDURE — 99213 OFFICE O/P EST LOW 20 MIN: CPT

## 2022-08-15 NOTE — PHYSICAL EXAM
[Normal] : affect was normal and insight and judgment were intact [de-identified] : mild erythema posterior oropharynx, L TM erythematous no discharge/pus

## 2022-08-15 NOTE — HISTORY OF PRESENT ILLNESS
[FreeTextEntry8] : Pt c/o decreased hearing and pain in L ear and throat for 2 weeks. Pain worse w/ swallowing.

## 2022-08-15 NOTE — ASSESSMENT
[FreeTextEntry1] : L otitis media - start course of augmentin. Possible adverse effects discussed with pt

## 2022-08-26 ENCOUNTER — APPOINTMENT (OUTPATIENT)
Dept: FAMILY MEDICINE | Facility: CLINIC | Age: 27
End: 2022-08-26

## 2022-08-26 VITALS
TEMPERATURE: 97.6 F | HEIGHT: 61 IN | OXYGEN SATURATION: 98 % | HEART RATE: 118 BPM | DIASTOLIC BLOOD PRESSURE: 72 MMHG | SYSTOLIC BLOOD PRESSURE: 113 MMHG | WEIGHT: 276 LBS | BODY MASS INDEX: 52.11 KG/M2

## 2022-08-26 DIAGNOSIS — R11.0 NAUSEA: ICD-10-CM

## 2022-08-26 PROCEDURE — 99214 OFFICE O/P EST MOD 30 MIN: CPT

## 2022-08-26 NOTE — ASSESSMENT
[FreeTextEntry1] : unsure of cause poss menstrual cycle related\par pt poor historian\par will check labs and US of abd to r/o acute causes \par if worsening symptoms to go to ER

## 2022-08-26 NOTE — HISTORY OF PRESENT ILLNESS
[FreeTextEntry8] : Pt presenting for right sided abd pain \par unsure if related to menstrual cycle\par used to be on depo shots stopped july 2nd\par denies similar pain in the past \par irregular menstrual pain \par no fevers or chills\par no nausea or vomiting\par has had abd pain in the past with menstrual cycles

## 2022-08-26 NOTE — REVIEW OF SYSTEMS
[Abdominal Pain] : abdominal pain [Nausea] : nausea [Constipation] : no constipation [Vomiting] : no vomiting [Heartburn] : heartburn [Melena] : no melena [Negative] : Musculoskeletal

## 2022-08-26 NOTE — PHYSICAL EXAM
[Normal] : normal rate, regular rhythm, normal S1 and S2 and no murmur heard [de-identified] : large, Right sided quadarant pain

## 2022-08-27 ENCOUNTER — OUTPATIENT (OUTPATIENT)
Dept: OUTPATIENT SERVICES | Facility: HOSPITAL | Age: 27
LOS: 1 days | End: 2022-08-27
Payer: MEDICAID

## 2022-08-27 ENCOUNTER — APPOINTMENT (OUTPATIENT)
Dept: ULTRASOUND IMAGING | Facility: CLINIC | Age: 27
End: 2022-08-27

## 2022-08-27 DIAGNOSIS — Z98.890 OTHER SPECIFIED POSTPROCEDURAL STATES: Chronic | ICD-10-CM

## 2022-08-27 DIAGNOSIS — R10.11 RIGHT UPPER QUADRANT PAIN: ICD-10-CM

## 2022-08-27 DIAGNOSIS — G93.2 BENIGN INTRACRANIAL HYPERTENSION: Chronic | ICD-10-CM

## 2022-08-27 PROCEDURE — 76700 US EXAM ABDOM COMPLETE: CPT

## 2022-08-27 PROCEDURE — 76700 US EXAM ABDOM COMPLETE: CPT | Mod: 26

## 2022-08-30 LAB
ALBUMIN SERPL ELPH-MCNC: 4.2 G/DL
ALP BLD-CCNC: 80 U/L
ALT SERPL-CCNC: 13 U/L
AMYLASE/CREAT SERPL: 43 U/L
ANION GAP SERPL CALC-SCNC: 12 MMOL/L
AST SERPL-CCNC: 10 U/L
BASOPHILS # BLD AUTO: 0.04 K/UL
BASOPHILS NFR BLD AUTO: 0.3 %
BILIRUB SERPL-MCNC: <0.2 MG/DL
BUN SERPL-MCNC: 11 MG/DL
CALCIUM SERPL-MCNC: 9.6 MG/DL
CHLORIDE SERPL-SCNC: 106 MMOL/L
CO2 SERPL-SCNC: 25 MMOL/L
CREAT SERPL-MCNC: 0.79 MG/DL
EGFR: 106 ML/MIN/1.73M2
EOSINOPHIL # BLD AUTO: 0.03 K/UL
EOSINOPHIL NFR BLD AUTO: 0.2 %
GLUCOSE SERPL-MCNC: 115 MG/DL
HCT VFR BLD CALC: 37.6 %
HGB BLD-MCNC: 11.3 G/DL
IMM GRANULOCYTES NFR BLD AUTO: 0.8 %
LYMPHOCYTES # BLD AUTO: 3.95 K/UL
LYMPHOCYTES NFR BLD AUTO: 27.4 %
MAN DIFF?: NORMAL
MCHC RBC-ENTMCNC: 22.2 PG
MCHC RBC-ENTMCNC: 30.1 GM/DL
MCV RBC AUTO: 74 FL
MONOCYTES # BLD AUTO: 0.98 K/UL
MONOCYTES NFR BLD AUTO: 6.8 %
NEUTROPHILS # BLD AUTO: 9.29 K/UL
NEUTROPHILS NFR BLD AUTO: 64.5 %
PLATELET # BLD AUTO: 494 K/UL
POTASSIUM SERPL-SCNC: 4.1 MMOL/L
PROT SERPL-MCNC: 6.9 G/DL
RBC # BLD: 5.08 M/UL
RBC # FLD: 19.8 %
SODIUM SERPL-SCNC: 142 MMOL/L
WBC # FLD AUTO: 14.41 K/UL

## 2022-10-31 ENCOUNTER — NON-APPOINTMENT (OUTPATIENT)
Age: 27
End: 2022-10-31

## 2023-01-09 ENCOUNTER — APPOINTMENT (OUTPATIENT)
Dept: FAMILY MEDICINE | Facility: CLINIC | Age: 28
End: 2023-01-09
Payer: MEDICAID

## 2023-01-09 VITALS
OXYGEN SATURATION: 98 % | BODY MASS INDEX: 48.22 KG/M2 | TEMPERATURE: 97.2 F | HEIGHT: 61 IN | HEART RATE: 122 BPM | DIASTOLIC BLOOD PRESSURE: 82 MMHG | SYSTOLIC BLOOD PRESSURE: 118 MMHG | WEIGHT: 255.38 LBS

## 2023-01-09 DIAGNOSIS — H61.22 IMPACTED CERUMEN, LEFT EAR: ICD-10-CM

## 2023-01-09 DIAGNOSIS — H61.21 IMPACTED CERUMEN, RIGHT EAR: ICD-10-CM

## 2023-01-09 PROCEDURE — 99213 OFFICE O/P EST LOW 20 MIN: CPT | Mod: 25

## 2023-01-09 PROCEDURE — 69209 REMOVE IMPACTED EAR WAX UNI: CPT

## 2023-01-09 NOTE — HISTORY OF PRESENT ILLNESS
[FreeTextEntry8] : Pt c/o b/l ear pain x 1 month. Denies fever, chills. Pt feels ears are "stuffy" and has hx of impaction.  - CXR with b/l patchy opacities.  - Continue O2 supplementation via HFNC + NRB. Wean as tolerated.   - Continue Decadron 6 mg daily through 4/26.   - Continue Remdesivir through 4/21, can extend x5 days if not improved.  - D/w ID, pt not candidate for toci at this time   - Robitussin PRN for cough  - Albuterol PRN for SOB/wheezing   - Airborne and contact precautions  - On full dose AC for Afib, also pending CTA to rule out acute PE.   - Tylenol PRN for fever

## 2023-01-09 NOTE — ASSESSMENT
[FreeTextEntry1] : b/l cerumen disimpaction performed using cerumen spoon and water and hydrogen peroxide lavage with patient's informed consent after discussion of possible risks and benefits. Pt tolerated well. Tympanic membranes intact after procedure.

## 2023-01-23 DIAGNOSIS — F80.0 PHONOLOGICAL DISORDER: ICD-10-CM

## 2023-02-17 ENCOUNTER — APPOINTMENT (OUTPATIENT)
Dept: NEUROLOGY | Facility: CLINIC | Age: 28
End: 2023-02-17

## 2023-02-23 ENCOUNTER — APPOINTMENT (OUTPATIENT)
Dept: NEUROLOGY | Facility: CLINIC | Age: 28
End: 2023-02-23
Payer: MEDICAID

## 2023-02-23 VITALS
BODY MASS INDEX: 48.15 KG/M2 | HEIGHT: 61 IN | SYSTOLIC BLOOD PRESSURE: 124 MMHG | DIASTOLIC BLOOD PRESSURE: 78 MMHG | WEIGHT: 255 LBS

## 2023-02-23 DIAGNOSIS — R51.9 HEADACHE, UNSPECIFIED: ICD-10-CM

## 2023-02-23 PROCEDURE — 99213 OFFICE O/P EST LOW 20 MIN: CPT

## 2023-02-23 RX ORDER — AMOXICILLIN AND CLAVULANATE POTASSIUM 875; 125 MG/1; MG/1
875-125 TABLET, COATED ORAL
Qty: 14 | Refills: 0 | Status: COMPLETED | COMMUNITY
Start: 2022-08-15 | End: 2023-02-23

## 2023-02-23 RX ORDER — ACETIC ACID 20 MG/ML
2 SOLUTION AURICULAR (OTIC)
Qty: 1 | Refills: 2 | Status: COMPLETED | COMMUNITY
Start: 2020-07-21 | End: 2023-02-23

## 2023-02-23 RX ORDER — MELOXICAM 15 MG/1
15 TABLET ORAL
Qty: 15 | Refills: 0 | Status: COMPLETED | COMMUNITY
Start: 2021-08-09 | End: 2023-02-23

## 2023-02-23 RX ORDER — NAPROXEN 500 MG/1
500 TABLET ORAL
Qty: 28 | Refills: 0 | Status: COMPLETED | COMMUNITY
Start: 2020-07-15 | End: 2023-02-23

## 2023-02-23 RX ORDER — NORETHINDRONE 0.35 MG/1
0.35 TABLET ORAL
Qty: 84 | Refills: 0 | Status: COMPLETED | COMMUNITY
Start: 2022-05-12 | End: 2023-02-23

## 2023-02-23 RX ORDER — BENZTROPINE MESYLATE 1 MG/1
1 TABLET ORAL
Refills: 0 | Status: COMPLETED | COMMUNITY
End: 2023-02-23

## 2023-02-23 RX ORDER — MELOXICAM 7.5 MG/1
7.5 TABLET ORAL DAILY
Qty: 30 | Refills: 0 | Status: COMPLETED | COMMUNITY
Start: 2022-06-22 | End: 2023-02-23

## 2023-02-23 RX ORDER — HALOPERIDOL 5 MG/1
5 TABLET ORAL
Qty: 45 | Refills: 0 | Status: COMPLETED | COMMUNITY
Start: 2022-05-02 | End: 2023-02-23

## 2023-02-23 RX ORDER — TOPIRAMATE 50 MG/1
50 TABLET, FILM COATED ORAL TWICE DAILY
Qty: 60 | Refills: 5 | Status: COMPLETED | COMMUNITY
Start: 2021-05-25 | End: 2023-02-23

## 2023-02-23 RX ORDER — MOMETASONE FUROATE 220 UG/1
220 INHALANT RESPIRATORY (INHALATION) DAILY
Qty: 1 | Refills: 3 | Status: COMPLETED | COMMUNITY
Start: 2020-03-02 | End: 2023-02-23

## 2023-02-23 RX ORDER — ALBUTEROL SULFATE 90 UG/1
108 (90 BASE) INHALANT RESPIRATORY (INHALATION)
Qty: 3 | Refills: 1 | Status: COMPLETED | COMMUNITY
Start: 2020-05-05 | End: 2023-02-23

## 2023-02-23 RX ORDER — MELOXICAM 7.5 MG/1
7.5 TABLET ORAL DAILY
Qty: 30 | Refills: 2 | Status: COMPLETED | COMMUNITY
Start: 2021-08-26 | End: 2023-02-23

## 2023-02-23 NOTE — ASSESSMENT
[FreeTextEntry1] : This is a 27-year-old woman with both migraine and tension type headache.  Her predominant type headache currently is tension headache for which she takes Tylenol which works very quickly.  She does not appear to be taking too much Tylenol per week.  I would just continue Tylenol as needed.  I asked her to call me in the future should she have prolonged headaches or more migrainous headaches and we could treat them appropriately.  I will see her back in 1 year for routine neurologic follow-up, sooner should the need arise.

## 2023-02-23 NOTE — CONSULT LETTER
[Dear  ___] : Dear  [unfilled], [Courtesy Letter:] : I had the pleasure of seeing your patient, [unfilled], in my office today. [Please see my note below.] : Please see my note below. [Consult Closing:] : Thank you very much for allowing me to participate in the care of this patient.  If you have any questions, please do not hesitate to contact me. [Sincerely,] : Sincerely, [FreeTextEntry3] : Darrell Feliciano M.D., Ph.D. DPN-N\par [facility]\par [facility] [place]\par Medical Director of Stroke Services\par [facility]\par

## 2023-02-23 NOTE — HISTORY OF PRESENT ILLNESS
[FreeTextEntry1] : Initial office visit May 25, 2021:\par This is a 25-year-old woman who presents today for neurologic evaluation of headache. She's had chronic headaches which have been labeled migraines. They used to be retro-orbital but now they're bifrontal in the wrap around her head. She does get some high pain with it however she does not endorse photophobia or nausea or vomiting as being associated headaches. She had been given Amerge without any benefit per the chart. She's currently taking Emgality injections without a change in her headache. She is here today to transfer her neurologic care for her migraine headaches.\par \par Followup July 27, 2021:\par This is a 26-year-old woman who presents today for neurologic followup of headache. Aaron a combination of what sounds like tension and migraine-type headache. She's been tried on multiple medications without benefit. I tried to start Topamax 50 mg twice a day. She said this made her feel weird and she had some palpitations. She is taking Tylenol now for her headaches. She states she wakes up most mornings without headache. She is here today for neurologic followup.\par \par Follow-up February 23, 2023:\par This is a 27-year-old woman who presents today with headache.  We had seen her in the past with headaches.  She has combination of both migraine and tension type headache.  We had originally tried Topamax for prevention but she did not like how it made her feel and is no longer taking it.  She states that in October she had about a 2 to 3-week run of very bad headache.  It was retro-orbital with photophobia and sonophobia but no significant nausea or vomiting.  This sounds like a prolonged migraine.  It resolved on its own.  She continues to have headaches which are more tension type in location which are relieved quickly with Tylenol.  She states she usually just needs 1 tablet.  She is here today for neurologic follow-up.

## 2023-03-06 NOTE — ED ADULT TRIAGE NOTE - NS ED NURSE DIRECT TO ROOM YN
Alla Cochran   Asthma Action Plan    MRN: [id number]   Description: 6 year old female           PCP and Center     Primary Care Provider  Isaura Schmitt MD Phone  [phone] [place]  [place] [id number] [address]                       Green zone video Yellow zone video Red zone video                                  Scan code for video demonstration   Scan code for video demonstration  of how to use albuterol inhaler   of how to use albuterol inhaler with  with a facemask.      mouthpiece.    [email or web address]   [email or web address]                  
Yes

## 2023-03-15 ENCOUNTER — APPOINTMENT (OUTPATIENT)
Dept: ORTHOPEDIC SURGERY | Facility: CLINIC | Age: 28
End: 2023-03-15

## 2023-04-13 ENCOUNTER — APPOINTMENT (OUTPATIENT)
Dept: FAMILY MEDICINE | Facility: CLINIC | Age: 28
End: 2023-04-13
Payer: MEDICAID

## 2023-04-13 ENCOUNTER — RESULT REVIEW (OUTPATIENT)
Age: 28
End: 2023-04-13

## 2023-04-13 VITALS
HEART RATE: 101 BPM | HEIGHT: 61 IN | OXYGEN SATURATION: 99 % | SYSTOLIC BLOOD PRESSURE: 122 MMHG | BODY MASS INDEX: 45.31 KG/M2 | DIASTOLIC BLOOD PRESSURE: 78 MMHG | TEMPERATURE: 98.5 F | WEIGHT: 240 LBS

## 2023-04-13 DIAGNOSIS — M54.50 LOW BACK PAIN, UNSPECIFIED: ICD-10-CM

## 2023-04-13 DIAGNOSIS — M54.12 RADICULOPATHY, CERVICAL REGION: ICD-10-CM

## 2023-04-13 PROCEDURE — 99214 OFFICE O/P EST MOD 30 MIN: CPT

## 2023-04-13 NOTE — HISTORY OF PRESENT ILLNESS
[FreeTextEntry8] : Pt c/o mid back pain x 1 month. Pt feels abnml sensation of bugs crawling in area of pain along w/ tingling and pain. Pain is nonradiating. Pain is 6-8/10 severity, 6/10 today. Tylenol helps slightly. \par Pt also c/o chronic neck pain radiating into R shoulder, a/w headaches at times. Pt has seen neuro who determined she likely gets tension headaches

## 2023-04-13 NOTE — ASSESSMENT
[FreeTextEntry1] : lumbar back pain, cervical radiculopathy - start PT. xr c spine, L spine. diclofenac prn. if no improvement see ortho

## 2023-04-14 ENCOUNTER — APPOINTMENT (OUTPATIENT)
Dept: RADIOLOGY | Facility: CLINIC | Age: 28
End: 2023-04-14
Payer: MEDICAID

## 2023-04-14 ENCOUNTER — OUTPATIENT (OUTPATIENT)
Dept: OUTPATIENT SERVICES | Facility: HOSPITAL | Age: 28
LOS: 1 days | End: 2023-04-14
Payer: MEDICAID

## 2023-04-14 DIAGNOSIS — M54.12 RADICULOPATHY, CERVICAL REGION: ICD-10-CM

## 2023-04-14 DIAGNOSIS — Z00.8 ENCOUNTER FOR OTHER GENERAL EXAMINATION: ICD-10-CM

## 2023-04-14 DIAGNOSIS — Z98.890 OTHER SPECIFIED POSTPROCEDURAL STATES: Chronic | ICD-10-CM

## 2023-04-14 DIAGNOSIS — G93.2 BENIGN INTRACRANIAL HYPERTENSION: Chronic | ICD-10-CM

## 2023-04-14 DIAGNOSIS — M54.50 LOW BACK PAIN, UNSPECIFIED: ICD-10-CM

## 2023-04-14 PROCEDURE — 72040 X-RAY EXAM NECK SPINE 2-3 VW: CPT | Mod: 26

## 2023-04-14 PROCEDURE — 72040 X-RAY EXAM NECK SPINE 2-3 VW: CPT

## 2023-04-14 PROCEDURE — 72100 X-RAY EXAM L-S SPINE 2/3 VWS: CPT | Mod: 26

## 2023-04-14 PROCEDURE — 72100 X-RAY EXAM L-S SPINE 2/3 VWS: CPT

## 2023-04-25 ENCOUNTER — APPOINTMENT (OUTPATIENT)
Dept: OTOLARYNGOLOGY | Facility: CLINIC | Age: 28
End: 2023-04-25

## 2023-04-26 ENCOUNTER — NON-APPOINTMENT (OUTPATIENT)
Age: 28
End: 2023-04-26

## 2023-05-18 ENCOUNTER — APPOINTMENT (OUTPATIENT)
Dept: FAMILY MEDICINE | Facility: CLINIC | Age: 28
End: 2023-05-18
Payer: MEDICAID

## 2023-05-18 VITALS
HEIGHT: 61 IN | HEART RATE: 113 BPM | BODY MASS INDEX: 45.57 KG/M2 | OXYGEN SATURATION: 99 % | TEMPERATURE: 97.3 F | WEIGHT: 241.38 LBS | DIASTOLIC BLOOD PRESSURE: 84 MMHG | SYSTOLIC BLOOD PRESSURE: 128 MMHG

## 2023-05-18 DIAGNOSIS — Z11.1 ENCOUNTER FOR SCREENING FOR RESPIRATORY TUBERCULOSIS: ICD-10-CM

## 2023-05-18 PROCEDURE — 86580 TB INTRADERMAL TEST: CPT

## 2023-05-18 PROCEDURE — 99212 OFFICE O/P EST SF 10 MIN: CPT | Mod: 25

## 2023-05-20 ENCOUNTER — APPOINTMENT (OUTPATIENT)
Dept: FAMILY MEDICINE | Facility: CLINIC | Age: 28
End: 2023-05-20

## 2023-06-03 ENCOUNTER — NON-APPOINTMENT (OUTPATIENT)
Age: 28
End: 2023-06-03

## 2023-06-05 ENCOUNTER — NON-APPOINTMENT (OUTPATIENT)
Age: 28
End: 2023-06-05

## 2023-06-06 ENCOUNTER — APPOINTMENT (OUTPATIENT)
Dept: FAMILY MEDICINE | Facility: CLINIC | Age: 28
End: 2023-06-06

## 2023-06-23 ENCOUNTER — APPOINTMENT (OUTPATIENT)
Dept: FAMILY MEDICINE | Facility: CLINIC | Age: 28
End: 2023-06-23

## 2023-06-27 ENCOUNTER — APPOINTMENT (OUTPATIENT)
Dept: FAMILY MEDICINE | Facility: CLINIC | Age: 28
End: 2023-06-27
Payer: MEDICAID

## 2023-06-27 VITALS
BODY MASS INDEX: 44.93 KG/M2 | DIASTOLIC BLOOD PRESSURE: 75 MMHG | TEMPERATURE: 97 F | SYSTOLIC BLOOD PRESSURE: 105 MMHG | WEIGHT: 238 LBS | OXYGEN SATURATION: 98 % | HEIGHT: 61 IN | HEART RATE: 122 BPM

## 2023-06-27 DIAGNOSIS — M77.41 METATARSALGIA, RIGHT FOOT: ICD-10-CM

## 2023-06-27 DIAGNOSIS — M76.60 ACHILLES TENDINITIS, UNSPECIFIED LEG: ICD-10-CM

## 2023-06-27 PROCEDURE — 99214 OFFICE O/P EST MOD 30 MIN: CPT

## 2023-06-27 RX ORDER — BUDESONIDE AND FORMOTEROL FUMARATE DIHYDRATE 160; 4.5 UG/1; UG/1
160-4.5 AEROSOL RESPIRATORY (INHALATION)
Qty: 1 | Refills: 5 | Status: DISCONTINUED | COMMUNITY
Start: 2021-10-22 | End: 2023-06-27

## 2023-06-27 RX ORDER — HALOPERIDOL 2 MG/1
2 TABLET ORAL
Qty: 60 | Refills: 1 | Status: DISCONTINUED | COMMUNITY
Start: 2021-10-21 | End: 2023-06-27

## 2023-06-27 NOTE — HISTORY OF PRESENT ILLNESS
[No Pertinent Cardiac History] : no history of aortic stenosis, atrial fibrillation, coronary artery disease, recent myocardial infarction, or implantable device/pacemaker [Asthma] : asthma [COPD] : no COPD [Sleep Apnea] : sleep apnea [Smoker] : not a smoker [No Adverse Anesthesia Reaction] : no adverse anesthesia reaction in self or family member [Chronic Anticoagulation] : no chronic anticoagulation [Chronic Kidney Disease] : no chronic kidney disease [Diabetes] : no diabetes [(Patient denies any chest pain, claudication, dyspnea on exertion, orthopnea, palpitations or syncope)] : Patient denies any chest pain, claudication, dyspnea on exertion, orthopnea, palpitations or syncope [Moderate (4-6 METs)] : Moderate (4-6 METs) [FreeTextEntry1] : removal of bone on R foot [FreeTextEntry2] : 7/5/23 [FreeTextEntry3] : [facility]  [FreeTextEntry4] : Pt presenting with mother for MCA \par Hx of ASD, special needs \par Takes \par Xanax\par Buspar 20 mg BID\par Lexapro 30 mg in AM\par OCP- daily \par \par NKDA [FreeTextEntry5] : ASthma controlled, no meds. CECILIA- is supposed to use CPAP daily [FreeTextEntry7] : EKG reviewed

## 2023-06-27 NOTE — ASSESSMENT
[Patient Optimized for Surgery] : Patient optimized for surgery [No Further Testing Recommended] : no further testing recommended [Continue medications as is] : Continue current medications [As per surgery] : as per surgery [FreeTextEntry4] : Pt medically optimized for procedure\par

## 2023-08-08 ENCOUNTER — NON-APPOINTMENT (OUTPATIENT)
Age: 28
End: 2023-08-08

## 2023-08-16 ENCOUNTER — APPOINTMENT (OUTPATIENT)
Dept: FAMILY MEDICINE | Facility: CLINIC | Age: 28
End: 2023-08-16
Payer: MEDICAID

## 2023-08-16 DIAGNOSIS — R10.9 UNSPECIFIED ABDOMINAL PAIN: ICD-10-CM

## 2023-08-16 DIAGNOSIS — R10.11 RIGHT UPPER QUADRANT PAIN: ICD-10-CM

## 2023-08-16 PROCEDURE — 99214 OFFICE O/P EST MOD 30 MIN: CPT

## 2023-08-16 NOTE — HISTORY OF PRESENT ILLNESS
[Post-hospitalization from ___ Hospital] : Post-hospitalization from [unfilled] Hospital [Discharge Summary] : discharge summary [Pertinent Labs] : pertinent labs [Discharge Med List] : discharge medication list [Med Reconciliation] : medication reconciliation has been completed [FreeTextEntry2] : Pt presenting with mother for ER f/u  Was at PT office, has chest pain, had facial numbness and stabbing pain  Had coffee and sandwich prior to this  Had workup at ER-> labs, EKG, CXR, CTA r/o PE -> all WNL  CTA showed gallstones, and recommended US to assess for cholecystitis

## 2023-08-16 NOTE — ASSESSMENT
[FreeTextEntry1] : poss gallstones, poss anxiety  currently asymptomatic.  discussed to watch diet and limit caffeine intake has f/u with gastroenterologist later this week will check US gallbladder

## 2023-08-18 ENCOUNTER — APPOINTMENT (OUTPATIENT)
Dept: FAMILY MEDICINE | Facility: CLINIC | Age: 28
End: 2023-08-18

## 2023-08-18 ENCOUNTER — OUTPATIENT (OUTPATIENT)
Dept: OUTPATIENT SERVICES | Facility: HOSPITAL | Age: 28
LOS: 1 days | End: 2023-08-18
Payer: MEDICAID

## 2023-08-18 ENCOUNTER — APPOINTMENT (OUTPATIENT)
Dept: ULTRASOUND IMAGING | Facility: CLINIC | Age: 28
End: 2023-08-18
Payer: MEDICAID

## 2023-08-18 DIAGNOSIS — Z98.890 OTHER SPECIFIED POSTPROCEDURAL STATES: Chronic | ICD-10-CM

## 2023-08-18 DIAGNOSIS — G93.2 BENIGN INTRACRANIAL HYPERTENSION: Chronic | ICD-10-CM

## 2023-08-18 DIAGNOSIS — R10.9 UNSPECIFIED ABDOMINAL PAIN: ICD-10-CM

## 2023-08-18 PROCEDURE — 76705 ECHO EXAM OF ABDOMEN: CPT

## 2023-08-18 PROCEDURE — 76705 ECHO EXAM OF ABDOMEN: CPT | Mod: 26

## 2023-08-31 ENCOUNTER — OFFICE (OUTPATIENT)
Dept: URBAN - METROPOLITAN AREA CLINIC 100 | Facility: CLINIC | Age: 28
Setting detail: OPHTHALMOLOGY
End: 2023-08-31
Payer: COMMERCIAL

## 2023-08-31 DIAGNOSIS — H05.113: ICD-10-CM

## 2023-08-31 DIAGNOSIS — H52.03: ICD-10-CM

## 2023-08-31 DIAGNOSIS — G43.009: ICD-10-CM

## 2023-08-31 PROCEDURE — 92015 DETERMINE REFRACTIVE STATE: CPT | Performed by: OPHTHALMOLOGY

## 2023-08-31 PROCEDURE — 92014 COMPRE OPH EXAM EST PT 1/>: CPT | Performed by: OPHTHALMOLOGY

## 2023-08-31 PROCEDURE — 92250 FUNDUS PHOTOGRAPHY W/I&R: CPT | Performed by: OPHTHALMOLOGY

## 2023-08-31 PROCEDURE — 92083 EXTENDED VISUAL FIELD XM: CPT | Performed by: OPHTHALMOLOGY

## 2023-08-31 ASSESSMENT — REFRACTION_AUTOREFRACTION
OS_SPHERE: +3.75
OS_CYLINDER: -2.75
OS_AXIS: 002
OD_SPHERE: +2.75
OD_AXIS: 006
OD_CYLINDER: -1.75

## 2023-08-31 ASSESSMENT — SPHEQUIV_DERIVED
OS_SPHEQUIV: 2.375
OS_SPHEQUIV: 1.625
OD_SPHEQUIV: 1.875
OS_SPHEQUIV: 1.625
OD_SPHEQUIV: 1.375
OD_SPHEQUIV: 1.375

## 2023-08-31 ASSESSMENT — REFRACTION_MANIFEST
OD_SPHERE: +2.25
OS_CYLINDER: -2.75
OD_SPHERE: +2.25
OD_VA1: 20/25-2
OS_SPHERE: +3.00
OS_AXIS: 005
OS_AXIS: 005
OS_VA1: 20/40+1
OD_CYLINDER: -1.75
OS_SPHERE: +3.00
OD_VA1: 20/25-2
OS_VA1: 20/40+1
OD_AXIS: 005
OD_AXIS: 005
OD_CYLINDER: -1.75
OS_CYLINDER: -2.75

## 2023-08-31 ASSESSMENT — REFRACTION_CURRENTRX
OD_SPHERE: +3.50
OD_OVR_VA: 20/
OS_CYLINDER: -2.25
OD_VPRISM_DIRECTION: SV
OD_CYLINDER: -2.25
OD_AXIS: 003
OS_SPHERE: +3.50
OS_OVR_VA: 20/
OS_AXIS: 175
OS_VPRISM_DIRECTION: SV

## 2023-08-31 ASSESSMENT — TONOMETRY
OD_IOP_MMHG: 19
OS_IOP_MMHG: 20

## 2023-08-31 ASSESSMENT — VISUAL ACUITY
OS_BCVA: 20/40-1
OD_BCVA: 20/40-1

## 2023-08-31 ASSESSMENT — CONFRONTATIONAL VISUAL FIELD TEST (CVF)
OS_FINDINGS: FULL
OD_FINDINGS: FULL

## 2023-09-14 ENCOUNTER — NON-APPOINTMENT (OUTPATIENT)
Age: 28
End: 2023-09-14

## 2023-09-22 ENCOUNTER — APPOINTMENT (OUTPATIENT)
Dept: ORTHOPEDIC SURGERY | Facility: CLINIC | Age: 28
End: 2023-09-22

## 2023-09-22 ENCOUNTER — NON-APPOINTMENT (OUTPATIENT)
Age: 28
End: 2023-09-22

## 2023-09-29 ENCOUNTER — APPOINTMENT (OUTPATIENT)
Dept: ORTHOPEDIC SURGERY | Facility: CLINIC | Age: 28
End: 2023-09-29

## 2023-10-05 ENCOUNTER — APPOINTMENT (OUTPATIENT)
Dept: ORTHOPEDIC SURGERY | Facility: CLINIC | Age: 28
End: 2023-10-05
Payer: MEDICAID

## 2023-10-05 VITALS
HEIGHT: 61 IN | WEIGHT: 238 LBS | DIASTOLIC BLOOD PRESSURE: 82 MMHG | HEART RATE: 109 BPM | SYSTOLIC BLOOD PRESSURE: 128 MMHG | BODY MASS INDEX: 44.93 KG/M2

## 2023-10-05 PROCEDURE — 73522 X-RAY EXAM HIPS BI 3-4 VIEWS: CPT

## 2023-10-05 PROCEDURE — 99213 OFFICE O/P EST LOW 20 MIN: CPT | Mod: 25

## 2023-10-17 ENCOUNTER — APPOINTMENT (OUTPATIENT)
Dept: FAMILY MEDICINE | Facility: CLINIC | Age: 28
End: 2023-10-17
Payer: MEDICAID

## 2023-10-17 VITALS
WEIGHT: 254 LBS | HEIGHT: 61 IN | DIASTOLIC BLOOD PRESSURE: 80 MMHG | OXYGEN SATURATION: 99 % | BODY MASS INDEX: 47.95 KG/M2 | HEART RATE: 96 BPM | SYSTOLIC BLOOD PRESSURE: 124 MMHG | TEMPERATURE: 97.2 F

## 2023-10-17 DIAGNOSIS — Z23 ENCOUNTER FOR IMMUNIZATION: ICD-10-CM

## 2023-10-17 DIAGNOSIS — Z13.228 ENCOUNTER FOR SCREENING FOR OTHER SUSPECTED ENDOCRINE DISORDER: ICD-10-CM

## 2023-10-17 DIAGNOSIS — F31.9 BIPOLAR DISORDER, UNSPECIFIED: ICD-10-CM

## 2023-10-17 DIAGNOSIS — Z13.0 ENCOUNTER FOR SCREENING FOR OTHER SUSPECTED ENDOCRINE DISORDER: ICD-10-CM

## 2023-10-17 DIAGNOSIS — Z00.00 ENCOUNTER FOR GENERAL ADULT MEDICAL EXAMINATION W/OUT ABNORMAL FINDINGS: ICD-10-CM

## 2023-10-17 DIAGNOSIS — F79 UNSPECIFIED INTELLECTUAL DISABILITIES: ICD-10-CM

## 2023-10-17 DIAGNOSIS — Z13.29 ENCOUNTER FOR SCREENING FOR OTHER SUSPECTED ENDOCRINE DISORDER: ICD-10-CM

## 2023-10-17 PROCEDURE — 90686 IIV4 VACC NO PRSV 0.5 ML IM: CPT

## 2023-10-17 PROCEDURE — G0008: CPT

## 2023-10-17 PROCEDURE — 99395 PREV VISIT EST AGE 18-39: CPT

## 2023-10-17 NOTE — DISCHARGE NOTE BEHAVIORAL HEALTH - NSBHDCVIOLRISK_PSY_A_CORE
FLU vaccine offered and accepted. Vaccine given in left deltoid without difficulty, patient tolerated shot well. Urine dip neg/neg. no

## 2023-10-18 LAB
25(OH)D3 SERPL-MCNC: 22 NG/ML
ALBUMIN SERPL ELPH-MCNC: 4.4 G/DL
ALP BLD-CCNC: 75 U/L
ALT SERPL-CCNC: 9 U/L
ANION GAP SERPL CALC-SCNC: 13 MMOL/L
AST SERPL-CCNC: 9 U/L
BASOPHILS # BLD AUTO: 0.05 K/UL
BASOPHILS NFR BLD AUTO: 0.5 %
BILIRUB SERPL-MCNC: 0.3 MG/DL
BUN SERPL-MCNC: 11 MG/DL
CALCIUM SERPL-MCNC: 9.6 MG/DL
CHLORIDE SERPL-SCNC: 102 MMOL/L
CHOLEST SERPL-MCNC: 146 MG/DL
CO2 SERPL-SCNC: 24 MMOL/L
CREAT SERPL-MCNC: 0.8 MG/DL
EGFR: 104 ML/MIN/1.73M2
EOSINOPHIL # BLD AUTO: 0.11 K/UL
EOSINOPHIL NFR BLD AUTO: 1.1 %
ESTIMATED AVERAGE GLUCOSE: 111 MG/DL
FERRITIN SERPL-MCNC: 13 NG/ML
FOLATE SERPL-MCNC: 13 NG/ML
GLUCOSE SERPL-MCNC: 89 MG/DL
HBA1C MFR BLD HPLC: 5.5 %
HCT VFR BLD CALC: 41.9 %
HDLC SERPL-MCNC: 42 MG/DL
HGB BLD-MCNC: 12.7 G/DL
IMM GRANULOCYTES NFR BLD AUTO: 0.4 %
IRON SATN MFR SERPL: 10 %
IRON SERPL-MCNC: 44 UG/DL
LDLC SERPL CALC-MCNC: 86 MG/DL
LYMPHOCYTES # BLD AUTO: 2.48 K/UL
LYMPHOCYTES NFR BLD AUTO: 25 %
MAN DIFF?: NORMAL
MCHC RBC-ENTMCNC: 25.1 PG
MCHC RBC-ENTMCNC: 30.3 GM/DL
MCV RBC AUTO: 82.8 FL
MONOCYTES # BLD AUTO: 0.54 K/UL
MONOCYTES NFR BLD AUTO: 5.4 %
NEUTROPHILS # BLD AUTO: 6.71 K/UL
NEUTROPHILS NFR BLD AUTO: 67.6 %
NONHDLC SERPL-MCNC: 103 MG/DL
PLATELET # BLD AUTO: 394 K/UL
POTASSIUM SERPL-SCNC: 4.7 MMOL/L
PROT SERPL-MCNC: 7.3 G/DL
RBC # BLD: 5.06 M/UL
RBC # FLD: 16.9 %
SODIUM SERPL-SCNC: 139 MMOL/L
TIBC SERPL-MCNC: 421 UG/DL
TRIGL SERPL-MCNC: 95 MG/DL
TSH SERPL-ACNC: 0.97 UIU/ML
UIBC SERPL-MCNC: 377 UG/DL
VIT B12 SERPL-MCNC: 476 PG/ML
WBC # FLD AUTO: 9.93 K/UL

## 2023-11-16 ENCOUNTER — APPOINTMENT (OUTPATIENT)
Dept: ORTHOPEDIC SURGERY | Facility: CLINIC | Age: 28
End: 2023-11-16

## 2023-11-28 ENCOUNTER — APPOINTMENT (OUTPATIENT)
Dept: OBGYN | Facility: CLINIC | Age: 28
End: 2023-11-28

## 2023-11-30 ENCOUNTER — NON-APPOINTMENT (OUTPATIENT)
Age: 28
End: 2023-11-30

## 2023-11-30 ENCOUNTER — APPOINTMENT (OUTPATIENT)
Dept: OTOLARYNGOLOGY | Facility: CLINIC | Age: 28
End: 2023-11-30
Payer: MEDICAID

## 2023-11-30 VITALS
HEIGHT: 60 IN | SYSTOLIC BLOOD PRESSURE: 118 MMHG | HEART RATE: 92 BPM | DIASTOLIC BLOOD PRESSURE: 76 MMHG | BODY MASS INDEX: 47.12 KG/M2 | WEIGHT: 240 LBS

## 2023-11-30 PROCEDURE — 99203 OFFICE O/P NEW LOW 30 MIN: CPT

## 2023-11-30 RX ORDER — NORETHINDRONE 0.35 MG/1
TABLET ORAL
Refills: 0 | Status: ACTIVE | COMMUNITY

## 2023-12-03 ENCOUNTER — NON-APPOINTMENT (OUTPATIENT)
Age: 28
End: 2023-12-03

## 2023-12-08 NOTE — H&P PST ADULT - NEUROLOGICAL COMMENTS
No
Pt had Intracranial pressure monitored for w/u for possible mass in 2013 - received anesthesia w/o complications - pressure was normal

## 2023-12-11 ENCOUNTER — APPOINTMENT (OUTPATIENT)
Dept: ORTHOPEDIC SURGERY | Facility: CLINIC | Age: 28
End: 2023-12-11
Payer: MEDICAID

## 2023-12-11 VITALS
HEIGHT: 60 IN | WEIGHT: 240 LBS | SYSTOLIC BLOOD PRESSURE: 111 MMHG | BODY MASS INDEX: 47.12 KG/M2 | DIASTOLIC BLOOD PRESSURE: 74 MMHG | HEART RATE: 94 BPM

## 2023-12-11 DIAGNOSIS — M25.551 PAIN IN RIGHT HIP: ICD-10-CM

## 2023-12-11 PROCEDURE — 99213 OFFICE O/P EST LOW 20 MIN: CPT

## 2023-12-27 ENCOUNTER — APPOINTMENT (OUTPATIENT)
Dept: PULMONOLOGY | Facility: CLINIC | Age: 28
End: 2023-12-27
Payer: MEDICAID

## 2023-12-27 VITALS — HEIGHT: 61.25 IN | BODY MASS INDEX: 47.6 KG/M2

## 2023-12-27 VITALS
OXYGEN SATURATION: 97 % | WEIGHT: 254 LBS | RESPIRATION RATE: 16 BRPM | SYSTOLIC BLOOD PRESSURE: 120 MMHG | HEIGHT: 60 IN | DIASTOLIC BLOOD PRESSURE: 72 MMHG | BODY MASS INDEX: 49.87 KG/M2 | HEART RATE: 94 BPM

## 2023-12-27 DIAGNOSIS — J45.909 UNSPECIFIED ASTHMA, UNCOMPLICATED: ICD-10-CM

## 2023-12-27 DIAGNOSIS — F41.9 ANXIETY DISORDER, UNSPECIFIED: ICD-10-CM

## 2023-12-27 DIAGNOSIS — G47.33 OBSTRUCTIVE SLEEP APNEA (ADULT) (PEDIATRIC): ICD-10-CM

## 2023-12-27 DIAGNOSIS — R06.02 SHORTNESS OF BREATH: ICD-10-CM

## 2023-12-27 PROCEDURE — 99214 OFFICE O/P EST MOD 30 MIN: CPT

## 2023-12-27 RX ORDER — INHALER, ASSIST DEVICES
SPACER (EA) MISCELLANEOUS
Qty: 1 | Refills: 0 | Status: ACTIVE | COMMUNITY
Start: 2023-12-27 | End: 1900-01-01

## 2023-12-27 RX ORDER — DICLOFENAC SODIUM 75 MG/1
75 TABLET, DELAYED RELEASE ORAL TWICE DAILY
Qty: 60 | Refills: 0 | Status: DISCONTINUED | COMMUNITY
Start: 2023-04-13 | End: 2023-12-27

## 2023-12-27 RX ORDER — METHYLPREDNISOLONE 4 MG/1
4 TABLET ORAL
Qty: 1 | Refills: 0 | Status: DISCONTINUED | COMMUNITY
Start: 2023-06-27 | End: 2023-12-27

## 2023-12-27 NOTE — ASSESSMENT
[FreeTextEntry1] : Possible asthma. Unable to do PFTs. Lungs clear now. Reports had cardiac w/u. SOB also contributed to by obesity. Untreated CECILIA.

## 2023-12-27 NOTE — HISTORY OF PRESENT ILLNESS
[TextBox_4] : Hx asthma.  Poor historian. Reviewed hx with her cousin who is here today; they say she is also one of her caretakers.     C/o GIRON worsening since about August 2023. At one point, while doing PT, had extreme sob, high HR; went to [facility] ER. D/C from ER. Some   wheeze, cough. Ran out of symbicort around Sept.  R/O of albuterol as well.    Also reports some occasional CP. Unclear nature of it.    hx CECILIA. Mild. Still not using cpap. Never got new CPAP mask. Has not been able to use cpap. Told by DME needs new sleep study to get equipment.  Sleep study ordered last visit; she never went.   Unable to lose weight.    [TextBox_100] : 5/8/20 [TextBox_108] : 6.2

## 2023-12-27 NOTE — PHYSICAL EXAM
[No Acute Distress] : no acute distress [Low Lying Soft Palate] : low lying soft palate [Elongated Uvula] : elongated uvula [Enlarged Base of the Tongue] : enlarged base of the tongue [III] : Mallampati Class: III [Supple] : supple [No Neck Mass] : no neck mass [Normal S1, S2] : normal s1, s2 [No Murmurs] : no murmurs [No Resp Distress] : no resp distress [No Acc Muscle Use] : no acc muscle use [Normal Rhythm and Effort] : normal rhythm and effort [No Abnormalities] : no abnormalities [Benign] : benign [Not Tender] : not tender [Soft] : soft [Normal Gait] : normal gait [No Clubbing] : no clubbing [No Cyanosis] : no cyanosis [No Edema] : no edema [FROM] : FROM [Normal Color/ Pigmentation] : normal color/ pigmentation [No Focal Deficits] : no focal deficits [Oriented x3] : oriented x3 [Normal Mood] : normal mood [Normal Affect] : normal affect [TextBox_54] : tachycardic [TextBox_89] : obese

## 2023-12-27 NOTE — PLAN
[TextEntry] : R/S symbicort MDI with spacer. Showed her how to do it. Albuterol prn. Will re-order split PSG. Labwork as above. Weight loss a must; will not improve sob w/o weight loss. All questions answered.  D/W pt and cousin.

## 2023-12-28 ENCOUNTER — NON-APPOINTMENT (OUTPATIENT)
Age: 28
End: 2023-12-28

## 2024-01-02 ENCOUNTER — APPOINTMENT (OUTPATIENT)
Dept: OTOLARYNGOLOGY | Facility: CLINIC | Age: 29
End: 2024-01-02
Payer: MEDICAID

## 2024-01-02 ENCOUNTER — APPOINTMENT (OUTPATIENT)
Dept: FAMILY MEDICINE | Facility: CLINIC | Age: 29
End: 2024-01-02

## 2024-01-02 VITALS
HEART RATE: 105 BPM | SYSTOLIC BLOOD PRESSURE: 117 MMHG | WEIGHT: 240 LBS | BODY MASS INDEX: 40.97 KG/M2 | DIASTOLIC BLOOD PRESSURE: 89 MMHG | HEIGHT: 64 IN

## 2024-01-02 PROCEDURE — 92557 COMPREHENSIVE HEARING TEST: CPT

## 2024-01-02 PROCEDURE — 92567 TYMPANOMETRY: CPT

## 2024-01-02 PROCEDURE — 99214 OFFICE O/P EST MOD 30 MIN: CPT | Mod: 25

## 2024-01-02 NOTE — DATA REVIEWED
[de-identified] : Audio 1/2/24: Type A tymps AU AD: Borderline wnl to mild SNHL AS: mild SNHL, left asymm 250-1000 Hz

## 2024-01-02 NOTE — HISTORY OF PRESENT ILLNESS
[de-identified] : Tayler White is a 29 yo female who was referred by Dr. Mcdowell for evaluation of left intermittent sharp otalgia for the past few months. She denies any trauma to the ear. She denies otorrhea, tinnitus, vertigo, or hearing loss. She denies recent fevers or chills. She denies facial weakness. She denies history of recurrent ear infections. Contrreas otes that she does grind her teeth and pain is worse in the morning when she wakes up. [FreeTextEntry1] : 1/2/24- Tayler White presents for follow-up. She notes that 1-2 weeks ago, she developed left otalgia and hearing loss. She had left tinnitus last week but has resolved. She had vertigo that lasted for a few hours each day over the past weekend. She denies otorrhea. She went to urgent care but no intervention was taken. No fevers or chills. She notes preceding URI.

## 2024-01-02 NOTE — ASSESSMENT
[FreeTextEntry1] : Tayler White presents for evaluation of vertigo, left diminished hearing, and tinnitus for the past 1-2 weeks. Otoscopic exam is normal. Audiogrma was performed and reviewed showing type A tymps AU, borderline wnl to mild SNHL AD, and mild SNHL AS with left aysmmetry. concern for sudden sensorineural hearing loss versus labrynthitis. Will start antibiotics and high dose prednisone taper. Will also obtain MRI brain/IAC.  - Start augmentin x 10 days. Side effects were discussed and include but are not limited to nausea, vomiting, diarrhea, and skin rash. - Start high dose prednisone taper. The potential side effects of high-dose steroid use were discussed at length. These risks include but are not limited to: increased appetite, insomnia, fluid retention, mood swings, weight gain, change in blood pressure, high blood glucose, possible adrenal suppression, osteoporosis, avascular necrosis of the hip, menstrual irregularities (if applicable), and cataracts - MRI brain/IAC. - f/u in 10 days with audio.

## 2024-01-02 NOTE — PHYSICAL EXAM
[Midline] : trachea located in midline position [Normal] : no rashes [de-identified] : Previous left parotidectomy incision site well healed. [de-identified] : Tenderness of left TMJ [de-identified] : Cracked left lower molar.

## 2024-01-02 NOTE — REASON FOR VISIT
[Subsequent Evaluation] : a subsequent evaluation for [FreeTextEntry2] : Left ear hearing loss and pain

## 2024-01-04 ENCOUNTER — APPOINTMENT (OUTPATIENT)
Dept: ORTHOPEDIC SURGERY | Facility: CLINIC | Age: 29
End: 2024-01-04
Payer: MEDICAID

## 2024-01-04 DIAGNOSIS — M70.61 TROCHANTERIC BURSITIS, RIGHT HIP: ICD-10-CM

## 2024-01-04 PROCEDURE — 99213 OFFICE O/P EST LOW 20 MIN: CPT

## 2024-01-04 NOTE — HISTORY OF PRESENT ILLNESS
[de-identified] : 01/04/2024 : JUNO SEGOVIA  is a 28 year  old female who presents to the office for follow-up evaluation of her right hip.  She states that her hip symptoms are still similar compared to last office visit but her ankle is bothering her more.  She states she feels that her hip pain is coming from the abnormal gait because of her ankle.  She is here for repeat evaluation to discuss MRI results.  She denies any numbness or tingling distally.  She has no other complaints today.  She states all her pain is lateral over her hip.  12/11/2023 : JUNO SEGOVIA  is a 28 year  old female who presents to the office for follow-up evaluation of the right hip.  She states that since the last office visit she has had continued pain into the lateral aspect of the right hip that radiates down the leg and is worse with certain activities and motions and alleviated with rest.  She states because of her ankle and foot and hip she is still having to use crutches to ambulate.  She states the hip is almost bothers her a little more than the ankle at this point.  She states she has been taking Tylenol and undergoing physical therapy which is not giving great relief.  She is here for repeat evaluation and reassess.  She denies any numbness or tingling distally.  She denies any acute traumatic injury.  10/05/2023 : JUNO SEGOVIA  is a 28 year  old female who presents to the office for evaluation of her right hip pain.  She states for about 2 weeks now she has had lateral sided right hip pain that is worse with activities and motions and alleviated with rest.  3 months or so ago she had a surgery for removal of the bone in the foot and since then she has been in a boot and using crutches to assist with ambulation.  She states she noticed the pain in the hip 2 weeks ago without any acute traumatic injury.  She has been taking Tylenol for pain for her foot.  She denies any numbness or tingling distally.  She has no other complaints today.

## 2024-01-04 NOTE — PHYSICAL EXAM
[de-identified] : General: Awake, alert, no acute distress, Patient was cooperative and appropriate during the examination.  The patient is of normal weight for height and age.  Walks without an antalgic gait.  Full, painless range of motion of the neck and back.  Exam of the bilateral lower extremities is intact and symmetric with regards to dermatologic, vascular, and neurologic exam. Bilateral lower extremity sensation is grossly intact to light touch in the DP/SP/T/S/S nerve distributions. Intact DF/PF/EHL. BIlateral lower extremities warm and well-perfused with brisk capillary refill.   Pulmonary: Regular, nonlabored breathing  Abdomen: Soft, nontender, nondistended.  Lymphatic: No evidence of axillary lymphadenopathy   Right hip Examination: Physical examination of the hip demonstrates normal skin without signs of skin changes or abnormalities. No erythema, warmth, or joint effusion is appreciated.  Sensation is intact to light touch L2-S1 Palpable DP/PT pulse EHL/FHL/TA/GSC motor function intact  Range of Motion 0-125 degrees of flexion to full extension 0-30 degrees of internal rotation with mild pain at the terminal degrees of ROM 0-70 degrees of external rotation with mild pain at the terminal degrees of ROM  Strength Testing Hip Flexors/Hip Extensors 5/5 Hip Abductors/Hip Adductors 5/5 Quadriceps/Hamstring 5/5 Patient is able to perform a straight leg raise without difficulty.  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

## 2024-01-04 NOTE — REASON FOR VISIT
[Family Member] : family member [Initial Visit] : an initial visit for [FreeTextEntry2] : Right hip pain

## 2024-01-04 NOTE — DISCUSSION/SUMMARY
[de-identified] : Assessment: Patient is a 28-year-old female with right hip trochanteric bursitis, gluteal tendinitis  Plan: I had a long discussion with the patient today regarding the nature of their diagnosis and treatment plan. We discussed the risks and benefits of no treatment as well as nonoperative and operative treatments.  I reviewed the x-rays today that revealed no acute bony pathology.  I reviewed the MRI which revealed evidence of truncation of the anterior lateral aspect of the acetabular labrum and tendinosis of the gluteus minimus and medius.  On examination all of her pain is lateral over the lateral aspect of her hip.  At this time I am recommending continue conservative treatment including ice, heat, rest, over-the-counter anti-inflammatories, avoiding leaning on the hip, physical therapy for symptomatic relief.  She was offered an injection today but declined.  She will follow-up as needed.  We did discuss potential injection in the future if symptoms were to persist despite conservative treatment.  Patient discussed and reviewed with Dr. Wade today.   The patient verbalizes their understanding and agrees with the plan.  All questions were answered to their satisfaction.

## 2024-01-09 ENCOUNTER — APPOINTMENT (OUTPATIENT)
Dept: FAMILY MEDICINE | Facility: CLINIC | Age: 29
End: 2024-01-09
Payer: MEDICAID

## 2024-01-09 VITALS
HEART RATE: 86 BPM | DIASTOLIC BLOOD PRESSURE: 78 MMHG | BODY MASS INDEX: 43.71 KG/M2 | TEMPERATURE: 97.7 F | SYSTOLIC BLOOD PRESSURE: 128 MMHG | OXYGEN SATURATION: 98 % | WEIGHT: 256 LBS | HEIGHT: 64 IN

## 2024-01-09 DIAGNOSIS — M25.571 PAIN IN RIGHT ANKLE AND JOINTS OF RIGHT FOOT: ICD-10-CM

## 2024-01-09 DIAGNOSIS — R07.89 OTHER CHEST PAIN: ICD-10-CM

## 2024-01-09 DIAGNOSIS — F84.0 AUTISTIC DISORDER: ICD-10-CM

## 2024-01-09 DIAGNOSIS — B07.9 VIRAL WART, UNSPECIFIED: ICD-10-CM

## 2024-01-09 PROCEDURE — 99215 OFFICE O/P EST HI 40 MIN: CPT

## 2024-01-11 ENCOUNTER — RX RENEWAL (OUTPATIENT)
Age: 29
End: 2024-01-11

## 2024-01-11 ENCOUNTER — APPOINTMENT (OUTPATIENT)
Dept: OTOLARYNGOLOGY | Facility: CLINIC | Age: 29
End: 2024-01-11
Payer: MEDICAID

## 2024-01-11 VITALS
BODY MASS INDEX: 40.97 KG/M2 | DIASTOLIC BLOOD PRESSURE: 84 MMHG | WEIGHT: 240 LBS | HEIGHT: 64 IN | SYSTOLIC BLOOD PRESSURE: 129 MMHG | HEART RATE: 89 BPM

## 2024-01-11 DIAGNOSIS — H91.20 SUDDEN IDIOPATHIC HEARING LOSS, UNSPECIFIED EAR: ICD-10-CM

## 2024-01-11 DIAGNOSIS — H83.09 LABYRINTHITIS, UNSPECIFIED EAR: ICD-10-CM

## 2024-01-11 DIAGNOSIS — M26.609 UNSPECIFIED TEMPOROMANDIBULAR JOINT DISORDER: ICD-10-CM

## 2024-01-11 PROCEDURE — 92567 TYMPANOMETRY: CPT

## 2024-01-11 PROCEDURE — 99213 OFFICE O/P EST LOW 20 MIN: CPT | Mod: 25

## 2024-01-11 PROCEDURE — 92557 COMPREHENSIVE HEARING TEST: CPT

## 2024-01-11 NOTE — ASSESSMENT
[FreeTextEntry1] : Tayler White presents for follow-up of acute labrynthitis versus sudden left sensorineural hearing loss. She is finishing augmentin and is in middle of high dose prednisone taper. Subjective hearing is normal and vertigo has resolved. Audiogram today shows type A tymps AU and normal hearing AU. Discussed possibility of Meniere's disease given low frequency hearing loss previously, but she has never had vertigo before. If it recurs, can consider this. MRI brain/IAC was reviewed showing no evidence of CPA or IAC mass/lesion. She has TMJ dysfunction and has not been implementing precautions.  - Reeducated about TMJ precautions. - Complete antibiotic and prednisone taper. - f/u in 2 weeks with audio.

## 2024-01-11 NOTE — DATA REVIEWED
[de-identified] : Type A tymps, AU. Hearing  - 8000Hz, AU. % at 50dB, AU. [de-identified] : MRI brain/IAC 1/8/24: 1. Mild chronic microvessel ischemic white matter disease 2. Normal evaluation of IACs. 3. Left mastoid effusion.

## 2024-01-11 NOTE — PHYSICAL EXAM
[de-identified] : Previous left parotidectomy incision site well healed. [de-identified] : Tenderness of left TMJ [Midline] : trachea located in midline position [de-identified] : Cracked left lower molar. [Normal] : no rashes

## 2024-01-11 NOTE — HISTORY OF PRESENT ILLNESS
[de-identified] : Tayler White is a 29 yo female who was referred by Dr. Mcdowell for evaluation of left intermittent sharp otalgia for the past few months. She denies any trauma to the ear. She denies otorrhea, tinnitus, vertigo, or hearing loss. She denies recent fevers or chills. She denies facial weakness. She denies history of recurrent ear infections. Contreras otes that she does grind her teeth and pain is worse in the morning when she wakes up. [FreeTextEntry1] : 1/2/24- Tayler White presents for follow-up. She notes that 1-2 weeks ago, she developed left otalgia and hearing loss. She had left tinnitus last week but has resolved. She had vertigo that lasted for a few hours each day over the past weekend. She denies otorrhea. She went to urgent care but no intervention was taken. No fevers or chills. She notes preceding URI.  1/11/24 - Tayler White presents for follow-up. She is in middle of high dose prednisone taper. She notes improvement in hearing. She notes left otalgia but does not implement TMJ precautions. She denies otorrhea, tinnitus, or further vertigo. Denies facial weakness or numbness. No fevers or chills.

## 2024-02-18 ENCOUNTER — NON-APPOINTMENT (OUTPATIENT)
Age: 29
End: 2024-02-18

## 2024-02-19 ENCOUNTER — EMERGENCY (EMERGENCY)
Facility: HOSPITAL | Age: 29
LOS: 1 days | Discharge: HOME | End: 2024-02-19
Attending: EMERGENCY MEDICINE
Payer: COMMERCIAL

## 2024-02-19 VITALS
HEART RATE: 121 BPM | SYSTOLIC BLOOD PRESSURE: 125 MMHG | TEMPERATURE: 98 F | DIASTOLIC BLOOD PRESSURE: 83 MMHG | OXYGEN SATURATION: 98 % | RESPIRATION RATE: 18 BRPM | WEIGHT: 281.53 LBS

## 2024-02-19 VITALS
OXYGEN SATURATION: 98 % | RESPIRATION RATE: 18 BRPM | SYSTOLIC BLOOD PRESSURE: 132 MMHG | DIASTOLIC BLOOD PRESSURE: 86 MMHG | HEART RATE: 111 BPM

## 2024-02-19 DIAGNOSIS — G93.2 BENIGN INTRACRANIAL HYPERTENSION: Chronic | ICD-10-CM

## 2024-02-19 DIAGNOSIS — Z98.890 OTHER SPECIFIED POSTPROCEDURAL STATES: Chronic | ICD-10-CM

## 2024-02-19 PROCEDURE — 99282 EMERGENCY DEPT VISIT SF MDM: CPT

## 2024-02-19 PROCEDURE — 99284 EMERGENCY DEPT VISIT MOD MDM: CPT

## 2024-02-19 RX ORDER — MECLIZINE HCL 12.5 MG
1 TABLET ORAL
Qty: 18 | Refills: 0
Start: 2024-02-19 | End: 2024-02-21

## 2024-02-19 RX ORDER — MECLIZINE HCL 12.5 MG
25 TABLET ORAL ONCE
Refills: 0 | Status: COMPLETED | OUTPATIENT
Start: 2024-02-19 | End: 2024-02-19

## 2024-02-19 RX ADMIN — Medication 25 MILLIGRAM(S): at 21:23

## 2024-02-19 NOTE — ED PROVIDER NOTE - NS_EDPROVIDERDISPOUSERTYPE_ED_A_ED
normal. Septum midline. Mucosa normal. No drainage or sinus tenderness. , mild congestion  Throat: lips, mucosa, and tongue normal; teeth and gums normal  Neck: no adenopathy, no carotid bruit, no JVD, supple, symmetrical, trachea midline and thyroid not enlarged, symmetric, no tenderness/mass/nodules  Lungs: clear to auscultation bilaterally  Heart: regular rate and rhythm, S1, S2 normal, no murmur, click, rub or gallop         Assessment:      viral upper respiratory illness and sinusitis      Plan:      Discussed dx and tx of URIs  Discussed the dx and tx of sinusitis. Suggested symptomatic OTC remedies. Nasal saline sprays for congestion. RTC prn.     See orders Attending Attestation (For Attendings USE Only)...

## 2024-02-19 NOTE — ED PROVIDER NOTE - PATIENT PORTAL LINK FT
You can access the FollowMyHealth Patient Portal offered by [facility] by registering at the following website: [email or web address]. By joining [employer]’s FollowMyHealth portal, you will also be able to view your health information using other applications (apps) compatible with our system.

## 2024-02-19 NOTE — ED PROVIDER NOTE - PHYSICAL EXAMINATION
Gen: NAD, AOx3  Head: NCAT  HEENT: EOMI, oral mucosa moist, normal conjunctiva, neck supple, normal TM b/l   Lung: no respiratory distress  CV:  Normal perfusion  MSK: No edema, no visible deformities  Neuro: No focal neurologic deficits, CN II-XII intact, 5/5 global strength, sensation intact, no dysmetria/ataxia, gait intact   Skin: No rash   Psych: normal affect

## 2024-02-19 NOTE — ED PROVIDER NOTE - OBJECTIVE STATEMENT
29yo F with anxiety and depression and recurrent dizziness seen by neuro normal MRI diagnosed with vertigo. presenting with intermittent positional dizziness noted with ear pain today left sided had ears cleared by urgent care and dizziness worse since eval. mild headache. no fever/chills. no focal weakness. no speech changes. no vision changes

## 2024-02-19 NOTE — ED PROVIDER NOTE - CLINICAL SUMMARY MEDICAL DECISION MAKING FREE TEXT BOX
patient with positional vertigo, normal ENT exam, normal neuro exam. no concern CVA recent engative MRI. recommend ENT Follow up possible irritation related to ear cleaning today at urgent care. will give another dose meclizine as she only received 25mg earlier. outpt Follow up has apt wednesday ent

## 2024-02-19 NOTE — ED ADULT NURSE NOTE - OBJECTIVE STATEMENT
pt a&ox3, vss, c/o dizziness pt in NAD @ this time, respirations even and unlabored, meds gvn per rx, POC discussed w/ patient, will continue to reassess.

## 2024-02-19 NOTE — ED PROVIDER NOTE - NSFOLLOWUPINSTRUCTIONS_ED_ALL_ED_FT
1. Return to ED for worsening, progressive or any other concerning symptoms   2. Follow up with your primary care doctor in 2-3days   3. Take 25mg-50mg of meclizine three times a day for 3-5 days.     Dizziness    Dizziness can manifest as a feeling of unsteadiness or light-headedness. You may feel like you are about to faint. This condition can be caused by a number of things, including medicines, dehydration, or illness. Drink enough fluid to keep your urine clear or pale yellow. Do not drink alcohol and limit your caffeine intake. Avoid quick or sudden movements.  Rise slowly from chairs and steady yourself until you feel okay. In the morning, first sit up on the side of the bed.    SEEK IMMEDIATE MEDICAL CARE IF YOU HAVE ANY OF THE FOLLOWING SYMPTOMS: vomiting, changes in your vision or speech, weakness in your arms or legs, trouble speaking or swallowing, chest pain, abdominal pain, shortness of breath, sweating, bleeding, headache, neck pain, or fever.

## 2024-02-19 NOTE — ED ADULT TRIAGE NOTE - CHIEF COMPLAINT QUOTE
sent from urgent care for dizziness, HA and nausea. was given meclizine at urgent care no effect. also c/o left ear pain

## 2024-02-20 ENCOUNTER — APPOINTMENT (OUTPATIENT)
Dept: NEUROLOGY | Facility: CLINIC | Age: 29
End: 2024-02-20

## 2024-02-21 ENCOUNTER — APPOINTMENT (OUTPATIENT)
Dept: OTOLARYNGOLOGY | Facility: CLINIC | Age: 29
End: 2024-02-21
Payer: MEDICAID

## 2024-02-21 VITALS
SYSTOLIC BLOOD PRESSURE: 118 MMHG | HEART RATE: 111 BPM | HEIGHT: 64 IN | BODY MASS INDEX: 40.97 KG/M2 | DIASTOLIC BLOOD PRESSURE: 83 MMHG | WEIGHT: 240 LBS

## 2024-02-21 DIAGNOSIS — H90.3 SENSORINEURAL HEARING LOSS, BILATERAL: ICD-10-CM

## 2024-02-21 PROCEDURE — 99214 OFFICE O/P EST MOD 30 MIN: CPT | Mod: 25

## 2024-02-21 PROCEDURE — 92567 TYMPANOMETRY: CPT

## 2024-02-21 PROCEDURE — 92557 COMPREHENSIVE HEARING TEST: CPT

## 2024-02-21 NOTE — HISTORY OF PRESENT ILLNESS
[de-identified] : Tayler White is a 29 yo female who was referred by Dr. Mcdowell for evaluation of left intermittent sharp otalgia for the past few months. She denies any trauma to the ear. She denies otorrhea, tinnitus, vertigo, or hearing loss. She denies recent fevers or chills. She denies facial weakness. She denies history of recurrent ear infections. Contreras otes that she does grind her teeth and pain is worse in the morning when she wakes up. [FreeTextEntry1] : 1/2/24- Tayler White presents for follow-up. She notes that 1-2 weeks ago, she developed left otalgia and hearing loss. She had left tinnitus last week but has resolved. She had vertigo that lasted for a few hours each day over the past weekend. She denies otorrhea. She went to urgent care but no intervention was taken. No fevers or chills. She notes preceding URI.  1/11/24 - Tayler White presents for follow-up. She is in middle of high dose prednisone taper. She notes improvement in hearing. She notes left otalgia but does not implement TMJ precautions. She denies otorrhea, tinnitus, or further vertigo. Denies facial weakness or numbness. No fevers or chills.  2/21/24 - Tayler White presnets for follow-up. She previously completed prednisone taper but after hearing recovered, she did not come for follow-up. She notes that two days ago, she developed vertigo that lasted for hours, left aural fullness, and hearing loss. This was after eating pretzels. She went to the ED and was given meclizine. She denies tinnitus but has dysequilibrium without further vertigo. No fevers or chills. No facial weakness or weakness of extremities. There is strong family history of vertigo.

## 2024-02-21 NOTE — PHYSICAL EXAM
[de-identified] : Previous left parotidectomy incision site well healed. [Midline] : trachea located in midline position [de-identified] : Cracked left lower molar. [Normal] : no rashes

## 2024-02-21 NOTE — ASSESSMENT
[FreeTextEntry1] : Tayler White presents for follow-up. She was previously treated for left sudden sensorineural hearing loss and possible labrynthitis with augmentin and high dose prednisone taper. She had recovery of hearing on repeat testing in the middle of taper. She had MRI brain/IAC which did not show CPA or IAC mass. She had another MRI with neurologist with stable report. She had repeat vertigo and diminished left hearing. Otoscopic exam is normal. Audiogram shows type A tymps AU, normal hearing AD, and mild SNHL rising to normal hearing AS. This was after salty intake. She also notes family history of vertigo. At this point, suspect she has Meniere's disease affecting the left ear. Will start low salt diet and medrol dose pack. Will also obtain VNG and ECOG.  - Low salt diet  - start medrol dose pack. The potential side effects of high-dose steroid use were discussed at length. These risks include but are not limited to: increased appetite, insomnia, fluid retention, mood swings, weight gain, change in blood pressure, high blood glucose, possible adrenal suppression, osteoporosis, avascular necrosis of the hip, menstrual irregularities (if applicable), and cataracts - VNG, ECOG - f/u in 1 week with audio.

## 2024-02-25 ENCOUNTER — NON-APPOINTMENT (OUTPATIENT)
Age: 29
End: 2024-02-25

## 2024-02-26 ENCOUNTER — EMERGENCY (EMERGENCY)
Facility: HOSPITAL | Age: 29
LOS: 1 days | Discharge: HOME | End: 2024-02-26
Attending: EMERGENCY MEDICINE
Payer: COMMERCIAL

## 2024-02-26 VITALS
SYSTOLIC BLOOD PRESSURE: 118 MMHG | WEIGHT: 276.02 LBS | OXYGEN SATURATION: 97 % | TEMPERATURE: 98 F | RESPIRATION RATE: 17 BRPM | HEART RATE: 117 BPM | DIASTOLIC BLOOD PRESSURE: 71 MMHG

## 2024-02-26 DIAGNOSIS — G93.2 BENIGN INTRACRANIAL HYPERTENSION: Chronic | ICD-10-CM

## 2024-02-26 DIAGNOSIS — Z98.890 OTHER SPECIFIED POSTPROCEDURAL STATES: Chronic | ICD-10-CM

## 2024-02-26 LAB
ANION GAP SERPL CALC-SCNC: 14 MMOL/L — SIGNIFICANT CHANGE UP (ref 5–17)
ANISOCYTOSIS BLD QL: SLIGHT — SIGNIFICANT CHANGE UP
BASOPHILS # BLD AUTO: 0 K/UL — SIGNIFICANT CHANGE UP (ref 0–0.2)
BASOPHILS NFR BLD AUTO: 0 % — SIGNIFICANT CHANGE UP (ref 0–2)
BUN SERPL-MCNC: 14.8 MG/DL — SIGNIFICANT CHANGE UP (ref 8–20)
CALCIUM SERPL-MCNC: 8.7 MG/DL — SIGNIFICANT CHANGE UP (ref 8.4–10.5)
CHLORIDE SERPL-SCNC: 100 MMOL/L — SIGNIFICANT CHANGE UP (ref 96–108)
CO2 SERPL-SCNC: 23 MMOL/L — SIGNIFICANT CHANGE UP (ref 22–29)
CREAT SERPL-MCNC: 0.72 MG/DL — SIGNIFICANT CHANGE UP (ref 0.5–1.3)
D DIMER BLD IA.RAPID-MCNC: <150 NG/ML DDU — SIGNIFICANT CHANGE UP
EGFR: 118 ML/MIN/1.73M2 — SIGNIFICANT CHANGE UP
EOSINOPHIL # BLD AUTO: 0.16 K/UL — SIGNIFICANT CHANGE UP (ref 0–0.5)
EOSINOPHIL NFR BLD AUTO: 0.9 % — SIGNIFICANT CHANGE UP (ref 0–6)
FLUAV AG NPH QL: SIGNIFICANT CHANGE UP
FLUBV AG NPH QL: SIGNIFICANT CHANGE UP
GLUCOSE SERPL-MCNC: 79 MG/DL — SIGNIFICANT CHANGE UP (ref 70–99)
HCT VFR BLD CALC: 40.1 % — SIGNIFICANT CHANGE UP (ref 34.5–45)
HGB BLD-MCNC: 12.8 G/DL — SIGNIFICANT CHANGE UP (ref 11.5–15.5)
LYMPHOCYTES # BLD AUTO: 36.6 % — SIGNIFICANT CHANGE UP (ref 13–44)
LYMPHOCYTES # BLD AUTO: 6.4 K/UL — HIGH (ref 1–3.3)
MANUAL SMEAR VERIFICATION: SIGNIFICANT CHANGE UP
MCHC RBC-ENTMCNC: 26.5 PG — LOW (ref 27–34)
MCHC RBC-ENTMCNC: 31.9 GM/DL — LOW (ref 32–36)
MCV RBC AUTO: 83 FL — SIGNIFICANT CHANGE UP (ref 80–100)
MICROCYTES BLD QL: SLIGHT — SIGNIFICANT CHANGE UP
MONOCYTES # BLD AUTO: 0.79 K/UL — SIGNIFICANT CHANGE UP (ref 0–0.9)
MONOCYTES NFR BLD AUTO: 4.5 % — SIGNIFICANT CHANGE UP (ref 2–14)
NEUTROPHILS # BLD AUTO: 9.83 K/UL — HIGH (ref 1.8–7.4)
NEUTROPHILS NFR BLD AUTO: 56.2 % — SIGNIFICANT CHANGE UP (ref 43–77)
PLAT MORPH BLD: NORMAL — SIGNIFICANT CHANGE UP
PLATELET # BLD AUTO: 430 K/UL — HIGH (ref 150–400)
POLYCHROMASIA BLD QL SMEAR: SLIGHT — SIGNIFICANT CHANGE UP
POTASSIUM SERPL-MCNC: 4.6 MMOL/L — SIGNIFICANT CHANGE UP (ref 3.5–5.3)
POTASSIUM SERPL-SCNC: 4.6 MMOL/L — SIGNIFICANT CHANGE UP (ref 3.5–5.3)
PROMYELOCYTES # FLD: 0.9 % — HIGH (ref 0–0)
RBC # BLD: 4.83 M/UL — SIGNIFICANT CHANGE UP (ref 3.8–5.2)
RBC # FLD: 16.1 % — HIGH (ref 10.3–14.5)
RBC BLD AUTO: ABNORMAL
RSV RNA NPH QL NAA+NON-PROBE: SIGNIFICANT CHANGE UP
SARS-COV-2 RNA SPEC QL NAA+PROBE: SIGNIFICANT CHANGE UP
SMUDGE CELLS # BLD: PRESENT — SIGNIFICANT CHANGE UP
SODIUM SERPL-SCNC: 137 MMOL/L — SIGNIFICANT CHANGE UP (ref 135–145)
TROPONIN T, HIGH SENSITIVITY RESULT: 11 NG/L — SIGNIFICANT CHANGE UP (ref 0–51)
VARIANT LYMPHS # BLD: 0.9 % — SIGNIFICANT CHANGE UP (ref 0–6)
WBC # BLD: 17.49 K/UL — HIGH (ref 3.8–10.5)
WBC # FLD AUTO: 17.49 K/UL — HIGH (ref 3.8–10.5)

## 2024-02-26 PROCEDURE — 71045 X-RAY EXAM CHEST 1 VIEW: CPT

## 2024-02-26 PROCEDURE — 85025 COMPLETE CBC W/AUTO DIFF WBC: CPT

## 2024-02-26 PROCEDURE — 93010 ELECTROCARDIOGRAM REPORT: CPT

## 2024-02-26 PROCEDURE — 87637 SARSCOV2&INF A&B&RSV AMP PRB: CPT

## 2024-02-26 PROCEDURE — 80048 BASIC METABOLIC PNL TOTAL CA: CPT

## 2024-02-26 PROCEDURE — 99285 EMERGENCY DEPT VISIT HI MDM: CPT | Mod: 25

## 2024-02-26 PROCEDURE — 82962 GLUCOSE BLOOD TEST: CPT

## 2024-02-26 PROCEDURE — 99284 EMERGENCY DEPT VISIT MOD MDM: CPT

## 2024-02-26 PROCEDURE — 85379 FIBRIN DEGRADATION QUANT: CPT

## 2024-02-26 PROCEDURE — 84484 ASSAY OF TROPONIN QUANT: CPT

## 2024-02-26 PROCEDURE — 71045 X-RAY EXAM CHEST 1 VIEW: CPT | Mod: 26

## 2024-02-26 PROCEDURE — 93005 ELECTROCARDIOGRAM TRACING: CPT

## 2024-02-26 PROCEDURE — 36415 COLL VENOUS BLD VENIPUNCTURE: CPT

## 2024-02-26 RX ORDER — ACETAMINOPHEN 500 MG
975 TABLET ORAL ONCE
Refills: 0 | Status: COMPLETED | OUTPATIENT
Start: 2024-02-26 | End: 2024-02-26

## 2024-02-26 RX ORDER — SODIUM CHLORIDE 9 MG/ML
1000 INJECTION INTRAMUSCULAR; INTRAVENOUS; SUBCUTANEOUS ONCE
Refills: 0 | Status: COMPLETED | OUTPATIENT
Start: 2024-02-26 | End: 2024-02-26

## 2024-02-26 RX ADMIN — Medication 975 MILLIGRAM(S): at 21:44

## 2024-02-26 RX ADMIN — SODIUM CHLORIDE 1000 MILLILITER(S): 9 INJECTION INTRAMUSCULAR; INTRAVENOUS; SUBCUTANEOUS at 17:29

## 2024-02-26 NOTE — ED PROVIDER NOTE - PROGRESS NOTE
Phone call from Leonardo. He received notification that [employer] would be discontinuing assistance with [employer] as of 12/31/2022. Leonardo inquires about other options for assistance.    ROSALIE advised pt of internal financial aid program through [employer]/TATE'S LIST [employer] is unable to assistance.   
Improved.

## 2024-02-26 NOTE — ED ADULT NURSE NOTE - PATIENT'S SEXUAL ORIENTATION
Message left with answering service for Dr. Lion Echevarria regarding birth of infant, infant fever, 25 hours from ROM to birth.  Requested call back if any orders beyond admission are needed.    Withheld/Decline to Answer

## 2024-02-26 NOTE — ED ADULT NURSE NOTE - OBJECTIVE STATEMENT
Pt presents to ED for dizziness during Physical Therapy. Pt diagnosed with Meniere's disease last week, taking meclizine, no meclizine taken today. Pt with developmental delay, mom at bedside.

## 2024-02-26 NOTE — ED PROVIDER NOTE - PHYSICAL EXAMINATION
Vital Signs per nursing documentation  Gen: well appearing, no acute distress  HEENT: NCAT, MMM  Cardiac: regular rate rhythm, normal S1S2  Chest: clear to auscultation bilateral, no wheezes or crackles  Abdomen: soft, non tender non distended  Extremity: no gross deformity, good perfusion  Neuro: nonfocal neuro exam

## 2024-02-26 NOTE — ED ADULT NURSE NOTE - NSFALLRISKINTERV_ED_ALL_ED

## 2024-02-26 NOTE — ED ADULT TRIAGE NOTE - CHIEF COMPLAINT QUOTE
biba UC c/o dizziness, chest tightness "on and off for a while"; pt states episode today started while @ PT today; dx w/ vertigo 1 week ago. denies loc/anticoag use/neruo changes/ams/loc.

## 2024-02-26 NOTE — ED ADULT NURSE NOTE - CAS ELECT INFOMATION PROVIDED
patient and mother verbalize understanding of and agree with discharge instructions.  ambulated off unit without incident./DC instructions

## 2024-02-26 NOTE — ED PROVIDER NOTE - CLINICAL SUMMARY MEDICAL DECISION MAKING FREE TEXT BOX
28 yof pmh recent meniere's dx, anxiety, here with chest pain and syncope. Was getting physical therapy for her Meniere's disease and chest pain and passed out, seen by her aid. Feels better now. Had cardiology w/up back in october that was negative per mom. Also had MR brain 3 weeks ago that was negative for dizziness. Denies dvt/pe history. Mom feels she is anxious, has follow up with her therapist.   AP - ekg with sinus tachycardia 116. will get labs ,ddimer given cp and syncope. recent negative w/up with cardiology. reassess

## 2024-02-26 NOTE — ED PROVIDER NOTE - PATIENT PORTAL LINK FT
You can access the FollowMyHealth Patient Portal offered by [facility] by registering at the following website: [email or web address]. By joining [email or web address]’s FollowMyHealth portal, you will also be able to view your health information using other applications (apps) compatible with our system.

## 2024-02-26 NOTE — ED PROVIDER NOTE - CARE PLAN
[facility] Emergency Department    [address]     [address] [place] [address]    Phone:  [phone]    Fax:  [phone]           Jess Juan   MRN: [id number]    Department:  [facility] Emergency Department   Date of Visit:  2/2 DIET FOR VOMITING/DIARRHEA (CHILD) (ENGLISH)    CHILDREN AND PNEUMONIA (ENGLISH)      Disclosure     Insurance plans vary and the physician(s) referred by the ER may not be covered by your plan.  Please contact your insurance company to determine coverage CARE PHYSICIAN AT ONCE OR RETURN IMMEDIATELY TO THE EMERGENCY DEPARTMENT. If you have been prescribed any medication(s), please fill your prescription right away and begin taking the medication(s) as directed.   If you believe that any of the medications harming yourself, contact [facility] at [phone]. - If you don’t have insurance, Shilpa Marie has partnered with [facility] [address] to help you get signed up for insurance coverage.   Patient [place] Principal Discharge DX:	Chest pain   1

## 2024-02-26 NOTE — ED PROVIDER NOTE - PROGRESS NOTE DETAILS
ross: Patient well-appearing, symptoms have resolved, likely due to her underlying Ménière's disease.  Tachycardia improved, afebrile.  Patient is currently on prednisone which explains the leukocytosis, and patient has no obvious infectious symptoms and no sign of infection on exam.  Stable for discharge

## 2024-02-26 NOTE — ED PROVIDER NOTE - OBJECTIVE STATEMENT
28 yof pmh recent meniere's dx, anxiety, here with chest pain and syncope. Was getting physical therapy for her Meniere's disease and chest pain and passed out, seen by her aid. Feels better now. Had cardiology w/up back in october that was negative per mom. Also had MR brain 3 weeks ago that was negative for dizziness. Denies dvt/pe history. Mom feels she is anxious, has follow up with her therapist.

## 2024-02-28 ENCOUNTER — APPOINTMENT (OUTPATIENT)
Dept: OTOLARYNGOLOGY | Facility: CLINIC | Age: 29
End: 2024-02-28
Payer: MEDICAID

## 2024-02-28 VITALS — HEIGHT: 64 IN | WEIGHT: 240 LBS | BODY MASS INDEX: 40.97 KG/M2

## 2024-02-28 DIAGNOSIS — R42 DIZZINESS AND GIDDINESS: ICD-10-CM

## 2024-02-28 PROCEDURE — 92567 TYMPANOMETRY: CPT

## 2024-02-28 PROCEDURE — 99213 OFFICE O/P EST LOW 20 MIN: CPT | Mod: 25

## 2024-02-28 PROCEDURE — 92557 COMPREHENSIVE HEARING TEST: CPT

## 2024-02-28 NOTE — PHYSICAL EXAM
[Midline] : trachea located in midline position [Normal] : no rashes [de-identified] : Previous left parotidectomy incision site well healed. [de-identified] : Cracked left lower molar.

## 2024-02-28 NOTE — ASSESSMENT
[FreeTextEntry1] : Tayler White presents for follow-up. She was previously treated for left sudden sensorineural hearing loss and possible labrynthitis with augmentin and high dose prednisone taper. She had recovery of hearing on repeat testing in the middle of taper. She had MRI brain/IAC which did not show CPA or IAC mass. She had another MRI with neurologist with stable report. She had repeat vertigo and diminished left hearing. Otoscopic exam was normal. Audiogram at last visit showed type A tymps AU, normal hearing AD, and mild SNHL rising to normal hearing AS. This was after salty intake. She also notes family history of vertigo. VNG and ECOG are pending due to suspicion for Meniere's disease. She was treated with medrol dose pack. Vertigo recurred after salty intake requiring ED visit. Audiogram today revealed type A tymps AU and normal hearing AU. Reinforced low salt diet.  - Low salt diet  - VNG, ECOG - f/u in 1 week with audio.

## 2024-02-28 NOTE — HISTORY OF PRESENT ILLNESS
[de-identified] : Tayler White is a 29 yo female who was referred by Dr. Mcdowell for evaluation of left intermittent sharp otalgia for the past few months. She denies any trauma to the ear. She denies otorrhea, tinnitus, vertigo, or hearing loss. She denies recent fevers or chills. She denies facial weakness. She denies history of recurrent ear infections. Contreras otes that she does grind her teeth and pain is worse in the morning when she wakes up. [FreeTextEntry1] : 1/2/24- Tayler White presents for follow-up. She notes that 1-2 weeks ago, she developed left otalgia and hearing loss. She had left tinnitus last week but has resolved. She had vertigo that lasted for a few hours each day over the past weekend. She denies otorrhea. She went to urgent care but no intervention was taken. No fevers or chills. She notes preceding URI.  1/11/24 - Tayler White presents for follow-up. She is in middle of high dose prednisone taper. She notes improvement in hearing. She notes left otalgia but does not implement TMJ precautions. She denies otorrhea, tinnitus, or further vertigo. Denies facial weakness or numbness. No fevers or chills.  2/21/24 - Tayler White presnets for follow-up. She previously completed prednisone taper but after hearing recovered, she did not come for follow-up. She notes that two days ago, she developed vertigo that lasted for hours, left aural fullness, and hearing loss. This was after eating pretzels. She went to the ED and was given meclizine. She denies tinnitus but has dysequilibrium without further vertigo. No fevers or chills. No facial weakness or weakness of extremities. There is strong family history of vertigo.  2/28/24 Mark Lester presents for follow-up. She went to ED earlier this week due to vertigo and syncope. This occurred after eating chips and buffalo wings. She completed medrol dose pack. She notes resolution of hearing loss. No otalgia, otorrhea, tinnitus. No fevers, chills, facial weakness.

## 2024-03-01 DIAGNOSIS — H81.09 MENIERE'S DISEASE, UNSPECIFIED EAR: ICD-10-CM

## 2024-03-01 DIAGNOSIS — Z79.899 OTHER LONG TERM (CURRENT) DRUG THERAPY: ICD-10-CM

## 2024-03-01 DIAGNOSIS — Z20.822 CONTACT WITH AND (SUSPECTED) EXPOSURE TO COVID-19: ICD-10-CM

## 2024-03-01 DIAGNOSIS — R55 SYNCOPE AND COLLAPSE: ICD-10-CM

## 2024-03-01 DIAGNOSIS — F41.9 ANXIETY DISORDER, UNSPECIFIED: ICD-10-CM

## 2024-03-01 DIAGNOSIS — R07.9 CHEST PAIN, UNSPECIFIED: ICD-10-CM

## 2024-03-01 DIAGNOSIS — R00.0 TACHYCARDIA, UNSPECIFIED: ICD-10-CM

## 2024-03-02 ENCOUNTER — APPOINTMENT (OUTPATIENT)
Dept: FAMILY MEDICINE | Facility: CLINIC | Age: 29
End: 2024-03-02
Payer: MEDICAID

## 2024-03-02 VITALS
TEMPERATURE: 97.7 F | BODY MASS INDEX: 40.97 KG/M2 | DIASTOLIC BLOOD PRESSURE: 70 MMHG | HEIGHT: 64 IN | SYSTOLIC BLOOD PRESSURE: 120 MMHG | WEIGHT: 240 LBS | HEART RATE: 85 BPM | OXYGEN SATURATION: 99 %

## 2024-03-02 DIAGNOSIS — H81.02 MENIERE'S DISEASE, LEFT EAR: ICD-10-CM

## 2024-03-02 PROCEDURE — 99213 OFFICE O/P EST LOW 20 MIN: CPT

## 2024-03-02 NOTE — REVIEW OF SYSTEMS
[Fever] : no fever [Earache] : no earache [Chills] : no chills [Chest Pain] : no chest pain [Palpitations] : palpitations [Shortness Of Breath] : no shortness of breath [Headache] : no headache [Dizziness] : no dizziness [Anxiety] : anxiety

## 2024-03-02 NOTE — ASSESSMENT
[FreeTextEntry1] : #Dizziness - Presenting with mother for follow up of dizziness for the past two weeks - Multiple ER and Urgent Care visits, most recently on 2/26/24 due to episode of near syncope, tachycardia and chest pain - Reviewed results from [facility] hospital, had elevated WBC however, in the absence of fever or other symptomatology, likely from recent steroid use. ECG with sinus tachycardia, HR improved while in hospital. All other work up normal - Suspect that her symptoms of dizziness are correlated to her suspected Meniere disease, has follow up with ENT scheduled this month for VNG and ECOG. Also will have follow up with neurology and neuro-ophthalmology  - Had normal cardiology work up recently. Given negative D-dimer and improvement in HR can follow routinely with cardiology, no urgent evaluation needed - Patient does have a strong component of anxiety which likely was causing elevated heart rate, mother has made appointment with psych and planning on changing anxiety medications to get better control of symptoms - Recommend to continue to adhere to low salt diet which seemed to trigger both episodes of severe dizziness  - Follow up with me in 1 month

## 2024-03-02 NOTE — PHYSICAL EXAM
[No Acute Distress] : no acute distress [No Lymphadenopathy] : no lymphadenopathy [Well-Appearing] : well-appearing [Supple] : supple [No Respiratory Distress] : no respiratory distress  [Clear to Auscultation] : lungs were clear to auscultation bilaterally [Normal Rate] : normal rate  [Regular Rhythm] : with a regular rhythm [Normal S1, S2] : normal S1 and S2 [No Rash] : no rash [Coordination Grossly Intact] : coordination grossly intact [No Focal Deficits] : no focal deficits

## 2024-03-02 NOTE — HISTORY OF PRESENT ILLNESS
[FreeTextEntry8] : 29yo female who presents to the office with dizziness.   Patient developed dizziness and vertigo-type symptoms about two weeks ago after eating pretzels. Patient initially went to Urgent Care had cerumen impaction which was cleared and given meclizine. Did not improve after meclizine and was told to go to ER for further work up, work up benign and discharged. Followed up with ENT and she was given prednisone for suspect Meneire disease. She was feeling better, but another episode of vertigo while working with PT and actually had syncope. She went to Urgent Care again, referred to ER because she was tachycardiac and with chest pain. At [facility], she was found to have leukocytosis, but without fever so likely elevated due to current steroid. D-dimer negative, and heart rate improved. She was seen by ENT on 2/28/24, recommended videonystagmography (VNG) and electrocochleography (ECOG) for evaluation for suspected Meniere's disease which she will do in March.   Mom concerned that her anxiety is worse.    [Parent] : parent

## 2024-03-04 ENCOUNTER — APPOINTMENT (OUTPATIENT)
Dept: FAMILY MEDICINE | Facility: CLINIC | Age: 29
End: 2024-03-04

## 2024-03-18 NOTE — ASU PATIENT PROFILE, ADULT - PRO INTERPRETER NEED 2
[employer]       Phone:  [phone]      Provided above information to patient via telephone.    Silvana PHILIPPE  [facility]  Phone: [phone]  Fax: [phone]     English

## 2024-03-27 ENCOUNTER — APPOINTMENT (OUTPATIENT)
Dept: OTOLARYNGOLOGY | Facility: CLINIC | Age: 29
End: 2024-03-27

## 2024-04-02 NOTE — ASU PATIENT PROFILE, ADULT - AS SC BRADEN MOBILITY
I called the patient regarding his normal stress test. He verbalized understanding.   (4) no limitation

## 2024-04-03 ENCOUNTER — APPOINTMENT (OUTPATIENT)
Dept: FAMILY MEDICINE | Facility: CLINIC | Age: 29
End: 2024-04-03
Payer: MEDICAID

## 2024-04-03 ENCOUNTER — NON-APPOINTMENT (OUTPATIENT)
Age: 29
End: 2024-04-03

## 2024-04-03 VITALS
WEIGHT: 240 LBS | HEART RATE: 121 BPM | HEIGHT: 64 IN | BODY MASS INDEX: 40.97 KG/M2 | RESPIRATION RATE: 16 BRPM | SYSTOLIC BLOOD PRESSURE: 102 MMHG | DIASTOLIC BLOOD PRESSURE: 74 MMHG | OXYGEN SATURATION: 98 %

## 2024-04-03 DIAGNOSIS — R42 DIZZINESS AND GIDDINESS: ICD-10-CM

## 2024-04-03 DIAGNOSIS — H65.199 OTHER ACUTE NONSUPPURATIVE OTITIS MEDIA, UNSPECIFIED EAR: ICD-10-CM

## 2024-04-03 PROCEDURE — 93000 ELECTROCARDIOGRAM COMPLETE: CPT

## 2024-04-03 PROCEDURE — 99213 OFFICE O/P EST LOW 20 MIN: CPT | Mod: 25

## 2024-04-03 RX ORDER — MECLIZINE HYDROCHLORIDE 12.5 MG/1
12.5 TABLET ORAL 3 TIMES DAILY
Qty: 90 | Refills: 0 | Status: ACTIVE | COMMUNITY
Start: 2024-04-03 | End: 1900-01-01

## 2024-04-03 NOTE — ASSESSMENT
[FreeTextEntry1] : #Dizziness chest pain - Presenting to the office with persistent dizziness and complaining of intermittent chest pain - ECG obtained, shows sinus tachycardia, rate 105. She does have Q waves in lead 3, but this has been present on prior ECGs. Doubt PE or cardiac etiology of dizziness at this time - She does have bilateral TM bulging and erythema R > L with pain on manipulation of the pinna - Suspect her dizziness is 2/2 AOM, will treat with Augmentin  - Meclizine PRN for dizziness - Follow up with ENT  - Follow up with cardiology if persistent chest pain

## 2024-04-03 NOTE — PHYSICAL EXAM
[No Acute Distress] : no acute distress [Well-Appearing] : well-appearing [No Lymphadenopathy] : no lymphadenopathy [Supple] : supple [No Respiratory Distress] : no respiratory distress  [Clear to Auscultation] : lungs were clear to auscultation bilaterally [Normal Rate] : normal rate  [Regular Rhythm] : with a regular rhythm [Normal S1, S2] : normal S1 and S2 [No Rash] : no rash [Coordination Grossly Intact] : coordination grossly intact [No Focal Deficits] : no focal deficits [de-identified] : Right TM erythematous and bulging, left TM with mild erythema

## 2024-04-03 NOTE — REVIEW OF SYSTEMS
[Fever] : no fever [Chills] : no chills [Earache] : earache [Chest Pain] : chest pain [Palpitations] : palpitations [Shortness Of Breath] : no shortness of breath [Headache] : no headache [Dizziness] : no dizziness [Anxiety] : anxiety

## 2024-04-03 NOTE — HISTORY OF PRESENT ILLNESS
[FreeTextEntry8] : 27yo female presenting to the office with persistent dizziness.   She has been seen in the office for this issue in the past and has been following with ENT. She was scheduled for videonystagmography (VNG) and electrocochleography (ECOG) for evaluation for suspected Meniere's disease in March, but this was postponed to May.   Endorses bilateral ear pain, R > L. Has history of ear infections in the past. She takes meclizine PRN for dizziness which greatly helps with symptoms. Dizziness present upon standing.  She also has been endorsing chest pain, which she has mentioned in the past. She has been following up with cardiology, has had negative stress testing in September 2023. Had chest pain and near syncopal event in February 2024 and went to [facility] ER for evaluation, normal work up.

## 2024-04-10 ENCOUNTER — APPOINTMENT (OUTPATIENT)
Dept: OTOLARYNGOLOGY | Facility: CLINIC | Age: 29
End: 2024-04-10

## 2024-04-22 ENCOUNTER — NON-APPOINTMENT (OUTPATIENT)
Age: 29
End: 2024-04-22

## 2024-04-24 ENCOUNTER — APPOINTMENT (OUTPATIENT)
Dept: FAMILY MEDICINE | Facility: CLINIC | Age: 29
End: 2024-04-24
Payer: MEDICAID

## 2024-04-24 VITALS
SYSTOLIC BLOOD PRESSURE: 136 MMHG | BODY MASS INDEX: 45.75 KG/M2 | WEIGHT: 268 LBS | TEMPERATURE: 97.2 F | OXYGEN SATURATION: 98 % | HEART RATE: 116 BPM | DIASTOLIC BLOOD PRESSURE: 90 MMHG | HEIGHT: 64 IN

## 2024-04-24 DIAGNOSIS — K21.9 GASTRO-ESOPHAGEAL REFLUX DISEASE W/OUT ESOPHAGITIS: ICD-10-CM

## 2024-04-24 DIAGNOSIS — H92.01 OTALGIA, RIGHT EAR: ICD-10-CM

## 2024-04-24 PROCEDURE — 99214 OFFICE O/P EST MOD 30 MIN: CPT

## 2024-04-24 RX ORDER — ALBUTEROL SULFATE 90 UG/1
108 (90 BASE) INHALANT RESPIRATORY (INHALATION)
Qty: 3 | Refills: 3 | Status: ACTIVE | COMMUNITY
Start: 2023-12-27 | End: 1900-01-01

## 2024-04-24 RX ORDER — BUDESONIDE AND FORMOTEROL FUMARATE DIHYDRATE 160; 4.5 UG/1; UG/1
160-4.5 AEROSOL RESPIRATORY (INHALATION) TWICE DAILY
Qty: 3 | Refills: 3 | Status: ACTIVE | COMMUNITY
Start: 2021-03-17 | End: 1900-01-01

## 2024-04-24 RX ORDER — PREDNISONE 10 MG/1
10 TABLET ORAL
Qty: 91 | Refills: 0 | Status: DISCONTINUED | COMMUNITY
Start: 2024-01-02 | End: 2024-04-24

## 2024-04-24 RX ORDER — AMOXICILLIN AND CLAVULANATE POTASSIUM 875; 125 MG/1; MG/1
875-125 TABLET, COATED ORAL
Qty: 20 | Refills: 0 | Status: DISCONTINUED | COMMUNITY
Start: 2024-01-02 | End: 2024-04-24

## 2024-04-24 RX ORDER — METHYLPREDNISOLONE 4 MG/1
4 TABLET ORAL
Qty: 1 | Refills: 0 | Status: DISCONTINUED | COMMUNITY
Start: 2024-02-21 | End: 2024-04-24

## 2024-04-24 RX ORDER — AMOXICILLIN AND CLAVULANATE POTASSIUM 875; 125 MG/1; MG/1
875-125 TABLET, COATED ORAL TWICE DAILY
Qty: 14 | Refills: 0 | Status: DISCONTINUED | COMMUNITY
Start: 2024-04-03 | End: 2024-04-24

## 2024-04-24 NOTE — PHYSICAL EXAM
[No Acute Distress] : no acute distress [Well-Appearing] : well-appearing [No Lymphadenopathy] : no lymphadenopathy [Supple] : supple [No Respiratory Distress] : no respiratory distress  [Clear to Auscultation] : lungs were clear to auscultation bilaterally [Normal Rate] : normal rate  [Regular Rhythm] : with a regular rhythm [Normal S1, S2] : normal S1 and S2 [Soft] : abdomen soft [Non Tender] : non-tender [No Rash] : no rash [Coordination Grossly Intact] : coordination grossly intact [No Focal Deficits] : no focal deficits [de-identified] : Right TM effusion without significant erythema

## 2024-04-24 NOTE — HISTORY OF PRESENT ILLNESS
[FreeTextEntry8] : 27yo female with PMH of vertigo and suspected Meniere's disease, asthma, anxiety, developmental disability who presents for complaint of heart burn and right ear pain.   Patient complaining of heart burn for the past three weeks. No new foods recently. Does not eat red sauce, spicy foods or chocolate. Tries to stay upright after eating a large meal. Only happens after dinner. Tried Tums but without improvement. Tried grandmother's omeprazole but also without improvement. No associated abdominal pain. No nausea, constipation or diarrhea.  Continues to experience right ear pain. Was seen in the office in beginning of month and diagnosed with otitis media, treated with Augmentin but pain persists.

## 2024-04-24 NOTE — ASSESSMENT
[FreeTextEntry1] : #GERD - Presenting with reflux for the past several weeks - Discussed dietary modifications with patient, remain upright after meals >30 minutes  - Will trial pepcid  - If no improvement see GI  #Right otalgia - Complaining of persistent right ear pain - Treated for AOM in beginning of month - Canal and TM is not erythematous, does have middle ear effusion, suspect their may be an allergic component causing effusions  - Start flonase  - Follow up with ENT

## 2024-04-24 NOTE — REVIEW OF SYSTEMS
[Fever] : no fever [Chills] : no chills [Earache] : earache [Nasal Discharge] : no nasal discharge [Sore Throat] : no sore throat [Cough] : no cough [Abdominal Pain] : no abdominal pain [Nausea] : no nausea [Constipation] : no constipation [Diarrhea] : diarrhea [Vomiting] : no vomiting [Heartburn] : heartburn

## 2024-05-09 ENCOUNTER — APPOINTMENT (OUTPATIENT)
Dept: NEUROSURGERY | Facility: CLINIC | Age: 29
End: 2024-05-09
Payer: MEDICAID

## 2024-05-09 VITALS
WEIGHT: 270 LBS | OXYGEN SATURATION: 98 % | BODY MASS INDEX: 53.01 KG/M2 | TEMPERATURE: 98.1 F | SYSTOLIC BLOOD PRESSURE: 139 MMHG | DIASTOLIC BLOOD PRESSURE: 87 MMHG | HEART RATE: 121 BPM | HEIGHT: 60 IN

## 2024-05-09 DIAGNOSIS — I67.1 CEREBRAL ANEURYSM, NONRUPTURED: ICD-10-CM

## 2024-05-09 DIAGNOSIS — H47.10 UNSPECIFIED PAPILLEDEMA: ICD-10-CM

## 2024-05-09 PROCEDURE — 99205 OFFICE O/P NEW HI 60 MIN: CPT

## 2024-05-09 NOTE — ASSESSMENT
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 She presents today for evaluation of findings.   Plan: - Concern for possible intracranial hypertension due to papilledema symptoms and headache - MRV to assess for venous sinus stenosis - Follow up after imaging performed - Patient and family in agreement with plan

## 2024-05-09 NOTE — END OF VISIT
[FreeTextEntry3] : Ms. 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  I informed the family of the natural history of an aneurysm of the size including the small risk of subarachnoid hemorrhage.  At this size treatment is not warranted and conservative management is the preferred treatment option with a repeat MRA in 1 year.  The patient and her mother also mentioned to me that she has chronic headaches and she was diagnosed with bilateral papilledema.  She also underwent a ICP monitoring study with Dr. Sherif Duran at [facility] which was negative.  She describes a medication which she has been previously prescribed that because bilateral lower extremity paresthesias.  I believe this medication was Diamox although the family do not know the name of the medication.  It seems like she has had an extensive workup for pseudotumor cerebri, however she has not underwent an MRV.  Therefore I have ordered her for an MRV with and without contrast to assess for venous sinus stenosis as a cause of her likely pseudotumor cerebral and headaches.  I will see her in the office after imaging is complete. [Time Spent: ___ minutes] : I have spent [unfilled] minutes of time on the encounter.

## 2024-05-09 NOTE — HISTORY OF PRESENT ILLNESS
[de-identified] : 28F with extensive PMH including developmental disability, papilledema who presents today as new patient for treatment of cerebral aneurysm. Her mother reports that she was diagnosed with papilledema as a child, was treated with diamox but was taken off due to poor side effects. She has been having headaches for the past 3 weeks, described as diffuse, worse when she wakes up in the morning and when she goes to sleep. Also admits to blurred vision. 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 Villalba for workup.   Neurologist: Norbert Foster

## 2024-05-09 NOTE — PHYSICAL EXAM
[General Appearance - Alert] : alert [General Appearance - In No Acute Distress] : in no acute distress [Oriented To Time, Place, And Person] : oriented to person, place, and time [Cranial Nerves Oculomotor (III)] : extraocular motion intact [Cranial Nerves Trigeminal (V)] : facial sensation intact symmetrically [Cranial Nerves Facial (VII)] : face symmetrical [Cranial Nerves Vestibulocochlear (VIII)] : hearing was intact bilaterally [Motor Strength] : muscle strength was normal in all four extremities [Sensation Tactile Decrease] : light touch was intact [Abnormal Walk] : normal gait [Extraocular Movements] : extraocular movements were intact [Hearing Threshold Finger Rub Not [place]] : hearing was normal [Neck Appearance] : the appearance of the neck was normal [] : no respiratory distress [Heart Rate And Rhythm] : heart rate was normal and rhythm regular [Abdomen Soft] : soft [Motor Tone] : muscle strength and tone were normal [Skin Color & Pigmentation] : normal skin color and pigmentation [General Appearance - Well Nourished] : well nourished [FreeTextEntry5] : Grade 1 papilledema R eye, unable to ascertain papilledema grade L eye

## 2024-05-10 ENCOUNTER — APPOINTMENT (OUTPATIENT)
Dept: ORTHOPEDIC SURGERY | Facility: CLINIC | Age: 29
End: 2024-05-10
Payer: MEDICAID

## 2024-05-10 VITALS — BODY MASS INDEX: 53.01 KG/M2 | HEART RATE: 100 BPM | HEIGHT: 60 IN | WEIGHT: 270 LBS

## 2024-05-10 DIAGNOSIS — M25.561 PAIN IN RIGHT KNEE: ICD-10-CM

## 2024-05-10 PROCEDURE — 73562 X-RAY EXAM OF KNEE 3: CPT | Mod: RT

## 2024-05-10 PROCEDURE — 99213 OFFICE O/P EST LOW 20 MIN: CPT | Mod: 25

## 2024-05-10 RX ORDER — MELOXICAM 15 MG/1
15 TABLET ORAL
Qty: 21 | Refills: 0 | Status: ACTIVE | COMMUNITY
Start: 2024-05-10 | End: 1900-01-01

## 2024-05-10 NOTE — PHYSICAL EXAM
[de-identified] : General: Awake, alert, no acute distress, Patient was cooperative and appropriate during the examination.  The patient is obese for height and age.  Walks with an antalgic gait on the right side. Right ] Knee Examination: Physical examination of the knee demonstrates normal skin without signs of skin changes or abnormalities. No erythema or warmth is appreciated. There is no joint effusion.  Sensation is intact to light touch L2-S1 Palpable DP/PT pulse EHL/FHL/TA/GSC motor function intact  Range of Motion 0 to 120 degrees with pain at terminal flexion  Strength Testing Quadriceps/Hamstring 5/5 Patient is able to perform a straight leg raise without difficulty.  Palpation Not tender to palpation about the proximal tibia, or patella No palpable defect appreciated in the quadriceps or patellar tendons Moderately tender to palpation of medial joint line as well as the medial epicondyle of the distal femur Mildly tender to palpation of lateral joint line  Special Tests Anterior Drawer negative Posterior Drawer negative Lachman Exam negative No Varus or Valgus Laxity at 0 or 30 degrees of knee flexion Donny's Test positive medially Active compression of the patella is negative for pain Translation of the patella 2 quadrants with a firm endpoint [de-identified] : X-rays including 3 views of the right knee were obtained in the office on 5/10/2024 and reviewed the patient.  There is no acute fracture or dislocation.  There is no significant arthritis.  There is lateral maltracking the patella.

## 2024-05-10 NOTE — DISCUSSION/SUMMARY
[de-identified] : Assessment: 28-year-old female with right knee pain secondary to a low-grade, chronic MCL sprain versus medial meniscus tear  Plan: I had a long discussion with the patient today regarding the nature of their diagnosis and treatment plan. We discussed the risks and benefits of no treatment as well as nonoperative and operative treatments.  I reviewed the patient's x-rays today with her in the office which are negative for any acute pathology.  On examination she has pain about the medial aspect of the knee without any gross instability.  At this time I recommend conservative treatment of the patient's condition with modalities including rest, ice, heat, anti-inflammatory medications, activity modifications, and home stretching and strengthening exercises.  A prescription for meloxicam was sent to the patient's pharmacy today.  I discussed with the patient the risks and benefits associated with NSAID use. GI precautions were discussed.  A referral for physical therapy was provided to begin working on exercises to help improve their strength and function.  Recommend follow-up in 8 weeks as needed.  If symptoms persist we may consider an MRI.  The patient verbalizes their understanding and agrees with the plan.  All questions were answered to their satisfaction.  I, Dr. Wade, personally performed the evaluation and management (E/M) services for this established patient who presents today with (a) new problem(s)/exacerbation of (an) existing condition(s).  That E/M includes conducting the clinically appropriate interval history &/or exam, assessing all new/exacerbated conditions, and establishing a new plan of care.  Today, my JENNIE, was here to observe my evaluation and management service for this new problem/exacerbated condition and follow the plan of care established by me going forward.

## 2024-05-10 NOTE — HISTORY OF PRESENT ILLNESS
[de-identified] : 5/10/2024: Tayler is a pleasant 28-year-old female presents the office today for evaluation of right knee pain.  The patient states that she may have injured her knee falling on the stairs a couple of months ago.  She states she is not really sure when the pain started but this may have been the inciting event.  Her pain is activity related and worse with walking.  It is negative by rest.  It primarily bothers her about the inner part of the knee.  She has not had any formal treatment.  The patient denies any fevers, chills, sweats, recent illnesses, numbness, tingling, weakness, or pain elsewhere at this time.  Of note, the patient is ambulating in a left lower extremity cam boot today and is due to have surgery for her foot in June.

## 2024-05-16 ENCOUNTER — RX RENEWAL (OUTPATIENT)
Age: 29
End: 2024-05-16

## 2024-05-16 RX ORDER — FLUTICASONE PROPIONATE 50 UG/1
50 SPRAY, METERED NASAL DAILY
Qty: 16 | Refills: 0 | Status: ACTIVE | COMMUNITY
Start: 2024-04-24 | End: 1900-01-01

## 2024-05-21 ENCOUNTER — RX RENEWAL (OUTPATIENT)
Age: 29
End: 2024-05-21

## 2024-05-21 RX ORDER — FAMOTIDINE 40 MG/1
40 TABLET, FILM COATED ORAL DAILY
Qty: 30 | Refills: 1 | Status: ACTIVE | COMMUNITY
Start: 2024-04-24 | End: 1900-01-01

## 2024-05-23 ENCOUNTER — NON-APPOINTMENT (OUTPATIENT)
Age: 29
End: 2024-05-23

## 2024-05-24 ENCOUNTER — EMERGENCY (EMERGENCY)
Facility: HOSPITAL | Age: 29
LOS: 1 days | Discharge: HOME | End: 2024-05-24
Attending: EMERGENCY MEDICINE
Payer: COMMERCIAL

## 2024-05-24 VITALS
HEART RATE: 125 BPM | OXYGEN SATURATION: 98 % | TEMPERATURE: 99 F | RESPIRATION RATE: 18 BRPM | DIASTOLIC BLOOD PRESSURE: 64 MMHG | SYSTOLIC BLOOD PRESSURE: 130 MMHG

## 2024-05-24 DIAGNOSIS — Z98.890 OTHER SPECIFIED POSTPROCEDURAL STATES: Chronic | ICD-10-CM

## 2024-05-24 DIAGNOSIS — G93.2 BENIGN INTRACRANIAL HYPERTENSION: Chronic | ICD-10-CM

## 2024-05-24 LAB
ALBUMIN SERPL ELPH-MCNC: 4.1 G/DL — SIGNIFICANT CHANGE UP (ref 3.3–5.2)
ALP SERPL-CCNC: 68 U/L — SIGNIFICANT CHANGE UP (ref 40–120)
ALT FLD-CCNC: 19 U/L — SIGNIFICANT CHANGE UP
AMPHET UR-MCNC: NEGATIVE — SIGNIFICANT CHANGE UP
ANION GAP SERPL CALC-SCNC: 17 MMOL/L — SIGNIFICANT CHANGE UP (ref 5–17)
APAP SERPL-MCNC: <3 UG/ML — LOW (ref 10–26)
APPEARANCE UR: CLEAR — SIGNIFICANT CHANGE UP
AST SERPL-CCNC: 52 U/L — HIGH
BACTERIA # UR AUTO: NEGATIVE /HPF — SIGNIFICANT CHANGE UP
BARBITURATES UR SCN-MCNC: NEGATIVE — SIGNIFICANT CHANGE UP
BASOPHILS # BLD AUTO: 0.02 K/UL — SIGNIFICANT CHANGE UP (ref 0–0.2)
BASOPHILS NFR BLD AUTO: 0.2 % — SIGNIFICANT CHANGE UP (ref 0–2)
BENZODIAZ UR-MCNC: POSITIVE
BILIRUB SERPL-MCNC: <0.2 MG/DL — LOW (ref 0.4–2)
BILIRUB UR-MCNC: NEGATIVE — SIGNIFICANT CHANGE UP
BUN SERPL-MCNC: 11.1 MG/DL — SIGNIFICANT CHANGE UP (ref 8–20)
CALCIUM SERPL-MCNC: 9.3 MG/DL — SIGNIFICANT CHANGE UP (ref 8.4–10.5)
CAST: 0 /LPF — SIGNIFICANT CHANGE UP (ref 0–4)
CHLORIDE SERPL-SCNC: 103 MMOL/L — SIGNIFICANT CHANGE UP (ref 96–108)
CO2 SERPL-SCNC: 19 MMOL/L — LOW (ref 22–29)
COCAINE METAB.OTHER UR-MCNC: NEGATIVE — SIGNIFICANT CHANGE UP
COLOR SPEC: YELLOW — SIGNIFICANT CHANGE UP
CREAT SERPL-MCNC: 0.75 MG/DL — SIGNIFICANT CHANGE UP (ref 0.5–1.3)
D DIMER BLD IA.RAPID-MCNC: <150 NG/ML DDU — SIGNIFICANT CHANGE UP
DIFF PNL FLD: ABNORMAL
EGFR: 113 ML/MIN/1.73M2 — SIGNIFICANT CHANGE UP
EOSINOPHIL # BLD AUTO: 0.01 K/UL — SIGNIFICANT CHANGE UP (ref 0–0.5)
EOSINOPHIL NFR BLD AUTO: 0.1 % — SIGNIFICANT CHANGE UP (ref 0–6)
ETHANOL SERPL-MCNC: <10 MG/DL — SIGNIFICANT CHANGE UP (ref 0–9)
FENTANYL UR QL SCN: NEGATIVE — SIGNIFICANT CHANGE UP
GLUCOSE SERPL-MCNC: 113 MG/DL — HIGH (ref 70–99)
GLUCOSE UR QL: NEGATIVE MG/DL — SIGNIFICANT CHANGE UP
HCG SERPL-ACNC: <4 MIU/ML — SIGNIFICANT CHANGE UP
HCT VFR BLD CALC: 40.3 % — SIGNIFICANT CHANGE UP (ref 34.5–45)
HGB BLD-MCNC: 13.2 G/DL — SIGNIFICANT CHANGE UP (ref 11.5–15.5)
IMM GRANULOCYTES NFR BLD AUTO: 0.7 % — SIGNIFICANT CHANGE UP (ref 0–0.9)
KETONES UR-MCNC: NEGATIVE MG/DL — SIGNIFICANT CHANGE UP
LEUKOCYTE ESTERASE UR-ACNC: NEGATIVE — SIGNIFICANT CHANGE UP
LYMPHOCYTES # BLD AUTO: 1.26 K/UL — SIGNIFICANT CHANGE UP (ref 1–3.3)
LYMPHOCYTES # BLD AUTO: 12 % — LOW (ref 13–44)
MAGNESIUM SERPL-MCNC: 2.2 MG/DL — SIGNIFICANT CHANGE UP (ref 1.6–2.6)
MCHC RBC-ENTMCNC: 26.3 PG — LOW (ref 27–34)
MCHC RBC-ENTMCNC: 32.8 GM/DL — SIGNIFICANT CHANGE UP (ref 32–36)
MCV RBC AUTO: 80.3 FL — SIGNIFICANT CHANGE UP (ref 80–100)
METHADONE UR-MCNC: NEGATIVE — SIGNIFICANT CHANGE UP
MONOCYTES # BLD AUTO: 0.22 K/UL — SIGNIFICANT CHANGE UP (ref 0–0.9)
MONOCYTES NFR BLD AUTO: 2.1 % — SIGNIFICANT CHANGE UP (ref 2–14)
NEUTROPHILS # BLD AUTO: 8.93 K/UL — HIGH (ref 1.8–7.4)
NEUTROPHILS NFR BLD AUTO: 84.9 % — HIGH (ref 43–77)
NITRITE UR-MCNC: NEGATIVE — SIGNIFICANT CHANGE UP
NT-PROBNP SERPL-SCNC: 39 PG/ML — SIGNIFICANT CHANGE UP (ref 0–300)
OPIATES UR-MCNC: NEGATIVE — SIGNIFICANT CHANGE UP
PCP SPEC-MCNC: SIGNIFICANT CHANGE UP
PCP UR-MCNC: NEGATIVE — SIGNIFICANT CHANGE UP
PH UR: 6.5 — SIGNIFICANT CHANGE UP (ref 5–8)
PLATELET # BLD AUTO: 438 K/UL — HIGH (ref 150–400)
POTASSIUM SERPL-MCNC: 4.7 MMOL/L — SIGNIFICANT CHANGE UP (ref 3.5–5.3)
POTASSIUM SERPL-SCNC: 4.7 MMOL/L — SIGNIFICANT CHANGE UP (ref 3.5–5.3)
PROT SERPL-MCNC: 7.7 G/DL — SIGNIFICANT CHANGE UP (ref 6.6–8.7)
PROT UR-MCNC: NEGATIVE MG/DL — SIGNIFICANT CHANGE UP
RBC # BLD: 5.02 M/UL — SIGNIFICANT CHANGE UP (ref 3.8–5.2)
RBC # FLD: 15.1 % — HIGH (ref 10.3–14.5)
RBC CASTS # UR COMP ASSIST: 28 /HPF — HIGH (ref 0–4)
SALICYLATES SERPL-MCNC: 1.4 MG/DL — LOW (ref 10–20)
SODIUM SERPL-SCNC: 139 MMOL/L — SIGNIFICANT CHANGE UP (ref 135–145)
SP GR SPEC: 1.02 — SIGNIFICANT CHANGE UP (ref 1–1.03)
SQUAMOUS # UR AUTO: 4 /HPF — SIGNIFICANT CHANGE UP (ref 0–5)
THC UR QL: NEGATIVE — SIGNIFICANT CHANGE UP
TROPONIN T, HIGH SENSITIVITY RESULT: <6 NG/L — SIGNIFICANT CHANGE UP (ref 0–51)
UROBILINOGEN FLD QL: 0.2 MG/DL — SIGNIFICANT CHANGE UP (ref 0.2–1)
WBC # BLD: 10.51 K/UL — HIGH (ref 3.8–10.5)
WBC # FLD AUTO: 10.51 K/UL — HIGH (ref 3.8–10.5)
WBC UR QL: 4 /HPF — SIGNIFICANT CHANGE UP (ref 0–5)

## 2024-05-24 PROCEDURE — 99285 EMERGENCY DEPT VISIT HI MDM: CPT

## 2024-05-24 PROCEDURE — 71045 X-RAY EXAM CHEST 1 VIEW: CPT | Mod: 26

## 2024-05-24 PROCEDURE — 93010 ELECTROCARDIOGRAM REPORT: CPT

## 2024-05-24 RX ORDER — KETOROLAC TROMETHAMINE 30 MG/ML
15 SYRINGE (ML) INJECTION ONCE
Refills: 0 | Status: DISCONTINUED | OUTPATIENT
Start: 2024-05-24 | End: 2024-05-24

## 2024-05-24 RX ADMIN — Medication 15 MILLIGRAM(S): at 18:51

## 2024-05-24 RX ADMIN — Medication 2 MILLIGRAM(S): at 21:23

## 2024-05-24 NOTE — ED PROVIDER NOTE - PROGRESS NOTE DETAILS
Troponin negative, D-dimer negative, chest x-ray clear.  Upon reassessment patient report that she feels depressed and she is hearing voices and the voices are telling to hurt herself.  Patient became agitated and wanted mom to leave.  Patient crying and thrashing around in the stretcher.  Additional psych blood work ordered.  One-to-one ordered.  Will need psych eval.  Mom left the hospital.     nadia Mckeon [phone] She is medically clear.  Urine tox showed benzo likely from Ativan that was given to patient due to agitation.  Telepsych consulted. Patient became agitated again.  Hitting herself.  Patient then threw herself onto the floor and refusing to get up.  Security at bedside patient moved to the stretcher.  Zyprexa 10 mg IM given.

## 2024-05-24 NOTE — ED PROVIDER NOTE - CLINICAL SUMMARY MEDICAL DECISION MAKING FREE TEXT BOX
28-year-old female history of Mnire's disease, anxiety, depression, bipolar presents with chest pain and shortness of breath.  Patient report chest pain  x 3 days.  Mom report that patient was at the air show and developed chest pain or shortness of breath.  Patient has seen a cardiologist for [facility] with MOCT monitoring on.  mom reported that patient had a full cardiac workup with nuclear stress test and echo October 2023 that was negative.  Mom report that patient has history of anxiety and this appears to be her typical anxiety attack.    Patient's  psychiatric medication was recently adjusted. Patient has left ankle boot and due for surgery of her foot in a few months.    As interpreted by ED physician, ECG is sinus tachycardia @ 115 with  no ST/T changes. 28-year-old female history of Mnire's disease, anxiety, depression, bipolar presents with chest pain and shortness of breath.  Patient report chest pain  x 3 days.  Mom report that patient was at the air show and developed chest pain or shortness of breath.  Patient has seen a cardiologist for [facility] with MOCT monitoring on.  mom reported that patient had a full cardiac workup with nuclear stress test and echo October 2023 that was negative.  Mom report that patient has history of anxiety and this appears to be her typical anxiety attack.    Patient's  psychiatric medication was recently adjusted. Patient has left ankle boot and due for surgery of her foot in a few months.    As interpreted by ED physician, ECG is sinus tachycardia @ 115 with  no ST/T changes. Troponin <6, D-dimer negative.  Chest x-ray unremarkable.  Patient is medically clear from cardiac standpoint of view.  Patient then report suicidal ideation agitated requiring sedation with Ativan followed by Zyprexa.  Pending psych eval.

## 2024-05-24 NOTE — ED PROVIDER NOTE - OBJECTIVE STATEMENT
28-year-old female history of Mnire's disease, anxiety, depression, bipolar presents with chest pain and shortness of breath.  Patient report chest pain  x 3 days.  Mom report that patient was at the air show and developed chest pain or shortness of breath.  Patient has seen a cardiologist for [facility] with MOCT monitoring on.  mom reported that patient had a full cardiac workup with nuclear stress test and echo October 2023 that was negative.  Mom report that patient has history of anxiety and this appears to be her typical anxiety attack.    Patient's  psychiatric medication was recently adjusted. Patient has left ankle boot and due for surgery of her foot in a few months.

## 2024-05-24 NOTE — ED ADULT NURSE NOTE - NSFALLUNIVINTERV_ED_ALL_ED
Bed/Stretcher in lowest position, wheels locked, appropriate side rails in place/Call bell, personal items and telephone in reach/Instruct patient to call for assistance before getting out of bed/chair/stretcher/Non-slip footwear applied when patient is off stretcher/[place] to call system/Physically safe environment - no spills, clutter or unnecessary equipment/Purposeful proactive rounding/Room/bathroom lighting operational, light cord in reach

## 2024-05-24 NOTE — ED ADULT NURSE REASSESSMENT NOTE - NS ED NURSE REASSESS COMMENT FT1
pt noted screaming in hallway for MD, kicking wall and yelling at mother. Dr Galo at bedside with RN attempting to console pt. Pt now c/o SI without HI AVH, 1:1 constant obs initiated attempted to change into yellow gown however pt agitated, MD aware

## 2024-05-24 NOTE — ED PROVIDER NOTE - NS ED ROS FT
CONSTITUTIONAL - no  fever, no diaphoresis, no weight change  SKIN - no rash  HEMATOLOGIC - no bleeding, no bruising  EYES - no eye pain, no blurred vision  ENT - no change in hearing, no pain  RESPIRATORY - (+) shortness of breath, no cough  CARDIAC - (+) chest pain, no palpitations  GI - no abd pain, no nausea, no vomiting, no diarrhea, no constipation, no bleeding  GENITO-URINARY - no discharge, no dysuria; no hematuria,   ENDO - no polydipsia, no polyuria, no heat/no cold intolerance  MUSCULOSKELETAL - no joint pain, no swelling, no redness  NEUROLOGIC - no weakness, no headache, no anesthesia, no paresthesias  PSYCH - (+)anxiety, non suicidal, non homicidal, no hallucination, (+)depression

## 2024-05-24 NOTE — ED PROVIDER NOTE - PHYSICAL EXAMINATION
VITAL SIGNS: I have reviewed nursing notes and confirm.  CONSTITUTIONAL:  (+)anxious (+) elevated BMI  SKIN: Skin exam is warm and dry, no acute rash. (+) superficial sun burn to right forearm  HEAD: Normocephalic; atraumatic.  EYES: PERRL, EOM intact; conjunctiva and sclera clear.  ENT: No nasal discharge; airway clear. Throat clear.  NECK: Supple; non tender.    CARD: (+) tachycardic   RESP: No wheezes,  no rales or rhonchi.   ABD:  soft; non-distended; non-tender;   EXT: Normal ROM. No clubbing, cyanosis or edema. (+) boot on the left foot   NEURO: Alert, oriented. Grossly unremarkable. No focal deficits.  moves all extremities,  normal gait   PSYCH: Cooperative, appropriate.

## 2024-05-25 VITALS
SYSTOLIC BLOOD PRESSURE: 130 MMHG | OXYGEN SATURATION: 95 % | DIASTOLIC BLOOD PRESSURE: 83 MMHG | RESPIRATION RATE: 18 BRPM | TEMPERATURE: 98 F | HEART RATE: 116 BPM

## 2024-05-25 DIAGNOSIS — F79 UNSPECIFIED INTELLECTUAL DISABILITIES: ICD-10-CM

## 2024-05-25 DIAGNOSIS — F84.0 AUTISTIC DISORDER: ICD-10-CM

## 2024-05-25 PROCEDURE — 96374 THER/PROPH/DIAG INJ IV PUSH: CPT

## 2024-05-25 PROCEDURE — 85379 FIBRIN DEGRADATION QUANT: CPT

## 2024-05-25 PROCEDURE — 83735 ASSAY OF MAGNESIUM: CPT

## 2024-05-25 PROCEDURE — 80053 COMPREHEN METABOLIC PANEL: CPT

## 2024-05-25 PROCEDURE — 84702 CHORIONIC GONADOTROPIN TEST: CPT

## 2024-05-25 PROCEDURE — 93005 ELECTROCARDIOGRAM TRACING: CPT

## 2024-05-25 PROCEDURE — 80307 DRUG TEST PRSMV CHEM ANLYZR: CPT

## 2024-05-25 PROCEDURE — 84484 ASSAY OF TROPONIN QUANT: CPT

## 2024-05-25 PROCEDURE — 99284 EMERGENCY DEPT VISIT MOD MDM: CPT

## 2024-05-25 PROCEDURE — 85025 COMPLETE CBC W/AUTO DIFF WBC: CPT

## 2024-05-25 PROCEDURE — 96372 THER/PROPH/DIAG INJ SC/IM: CPT | Mod: XU

## 2024-05-25 PROCEDURE — 71045 X-RAY EXAM CHEST 1 VIEW: CPT

## 2024-05-25 PROCEDURE — 96375 TX/PRO/DX INJ NEW DRUG ADDON: CPT

## 2024-05-25 PROCEDURE — 81001 URINALYSIS AUTO W/SCOPE: CPT

## 2024-05-25 PROCEDURE — 36415 COLL VENOUS BLD VENIPUNCTURE: CPT

## 2024-05-25 PROCEDURE — 83880 ASSAY OF NATRIURETIC PEPTIDE: CPT

## 2024-05-25 PROCEDURE — 99223 1ST HOSP IP/OBS HIGH 75: CPT

## 2024-05-25 PROCEDURE — G0378: CPT

## 2024-05-25 PROCEDURE — 99285 EMERGENCY DEPT VISIT HI MDM: CPT | Mod: 25

## 2024-05-25 RX ORDER — OLANZAPINE 15 MG/1
10 TABLET, FILM COATED ORAL ONCE
Refills: 0 | Status: COMPLETED | OUTPATIENT
Start: 2024-05-25 | End: 2024-05-25

## 2024-05-25 RX ADMIN — OLANZAPINE 10 MILLIGRAM(S): 15 TABLET, FILM COATED ORAL at 00:24

## 2024-05-25 NOTE — ED CDU PROVIDER DISPOSITION NOTE - CLINICAL COURSE
c/o chest pain medically w/u and cleared, felt anxoius/si, seen and cleared by telepsych for outpt f/u.

## 2024-05-25 NOTE — ED BEHAVIORAL HEALTH ASSESSMENT NOTE - VOLUNTARY INTRAMUSCULAR MEDICATION DETAILS
Torodol 15 mg IV on 05-24-24 at 1800, Ativan 2 mg IV on 5-24-24 at 2100, Zyprexa 10 mg IM on 05-25-24 at 0000

## 2024-05-25 NOTE — ED BEHAVIORAL HEALTH ASSESSMENT NOTE - SAFETY PLAN ADDT'L DETAILS
Safety plan discussed with.../Education provided regarding environmental safety / lethal means restriction/Provision of National Suicide Prevention Lifeline 8-001-868-KYUR (9051)

## 2024-05-25 NOTE — ED BEHAVIORAL HEALTH ASSESSMENT NOTE - NSBHMSEMOOD_PSY_A_CORE
I will send Ceftin to the pharmacy  She should stop the doxycycline and start this  She can also take Sudafed as needed for for congestion, Mucinex as needed for cough (to help get the mucus out) 
Lm to call office
Patient called  You treated her on 01/24/20 for sinus infection  She was given Doxycycline  She still has a couple days left of antibiotic and she is not feeling 100 % yet  She has a lot of sinus congestion still and her mucous is still green  She is not using anything OTC to treat, just the antibiotic and Astelin nasal spray  She's not sure if she needs a different antibiotic or if there is something else she should be doing  She also still has a bit of a cough   Please advise
Patient called and received message
Depressed

## 2024-05-25 NOTE — ED BEHAVIORAL HEALTH ASSESSMENT NOTE - DETAILS
SW and attending separately spoke with mother intellectual disability to harm/kill self maternal grandmother - schizophrenia Patient unable to complete d/t intellectual disability see HPI

## 2024-05-25 NOTE — ED BEHAVIORAL HEALTH ASSESSMENT NOTE - OTHER PAST PSYCHIATRIC HISTORY (INCLUDE DETAILS REGARDING ONSET, COURSE OF ILLNESS, INPATIENT/OUTPATIENT TREATMENT)
at least 2-3 prior psychiatric hospitalizations, last hospitalized in 2021 at [facility] for 8 days, currently in outpatient treatment with Dr. Donn Price

## 2024-05-25 NOTE — ED CDU PROVIDER INITIAL DAY NOTE - OBJECTIVE STATEMENT
28-year-old female history of Mnire's disease, anxiety, depression, bipolar presents with chest pain and shortness of breath.  Patient report chest pain  x 3 days.  Mom report that patient was at the air show and developed chest pain or shortness of breath.  Patient has seen a cardiologist for [facility] with MOCT monitoring on.  mom reported that patient had a full cardiac workup with nuclear stress test and echo October 2023 that was negative.  Mom report that patient has history of anxiety and this appears to be her typical anxiety attack.    Patient's  psychiatric medication was recently adjusted. Patient has left ankle boot and due for surgery of her foot in a few months.

## 2024-05-25 NOTE — ED BEHAVIORAL HEALTH ASSESSMENT NOTE - OTHER
concrete no known hx violence Discussed safety with mother, including keeping medications in lockbox, no access to knives or firearms, ongoing supervision supportive family, engaged in treatment, engaged in ancillary services

## 2024-05-25 NOTE — ED BEHAVIORAL HEALTH ASSESSMENT NOTE - HPI (INCLUDE ILLNESS QUALITY, SEVERITY, DURATION, TIMING, CONTEXT, MODIFYING FACTORS, ASSOCIATED SIGNS AND SYMPTOMS)
28 year old female, on disability (OPWDD), domiciled with mother and grandparents, with PMH papilledema, Meniere's disease, PPH intellectual disability, mild-moderate, autism spectrum disorder, anxiety disorder, bipolar disorder vs schizophrenia, 2-3 prior psychiatric hospitalizations (last in 2021 at [facility] for similar presentation), multiple ED presentations, no known hx SA/SIB/violence, BIBEMS a/b walk-in clinic after patient reported chest pain. Patient initially tachycardic, received Ativan 2 mg IV. Patient later reported CAH telling her to kill herself. Psychiatry consulted for evaluation.    Patient was seen 10+ hrs after initial presentation. On eval, with anxious affect, slow to warm, but preoccupied with admission, responds to many questions with "I don't know." She describes events of today, states that she attended an air show with staff at developmental program, but didn't feel well and so requested to be brought to the walk in clinic, who then sent her to the hospital. States that she continues to hear voices which have been ongoing for 3+ years, telling her to kill herself, describes these as mean and loud voices. Reports feeling depressed constantly with no known trigger. States that she has considered various methods, including stabbing herself. She denies any recent altercations with family, peers or staff members at her program. Denies sleep disturbance or appetite change, recent self-harm. She states that she attends her programming through her developmental program almost daily. States that she mainly wants to be admitted, but when asked about why states "I don't know" - feels that she does not want to talk to her therapist or psychiatrist because she doesn't like them, also feels like her current medications are not helping. Patient also preoccupied with specifics of admission, e.g. asks to be admitted to alternative hospital as she has previously been admitted to [facility].     Spoke with patient's mother, states that patient receives services from [facility]. States that patient when to air show today but reported chest pain/SOB similar to prior panic attacks. States that patient goes to hospitals frequently (most recently 2 months ago for similar presentation), typically wanting to r/o physical conditions, and has difficulty believing providers when there are no findings. Denies any recent altercations, but has been upset at mom and outpatient provider who have pointed out patient's preoccupation with being assessed by doctors/being hospitalized, suspect illness anxiety or Munchausen's disease. Mother denies any safety concerns, states that patient has appeared to be at her baseline - with health anxiety, though at home is often cheerful, no prior SA/SIB, report that she, patient's grandparents, or staff members at her program are always supervising patient and that she has no access to means of harm. Reports that patient is also very health-conscious and has a strong fear of death, is unsure whether patient cognitively understands implications of suicide. Reports adherence to medications, upcoming regular psychiatric and therapy appointments. Discussed safety plan with mother.    Collateral:  See separate note from SW for initial collateral from mom    PSYCKES:  DX: Intellectual Disabilities * Communication Disorders * Adjustment Disorder * Unspecified/Other Anxiety Disorder * Bipolar I * Autism Spectrum Disorder *  Unspecified/Other Psychotic Disorders * Panic Disorder * Persistent Depressive Disorder * Generalized Anxiety Disorder * Unspecified/Other Bipolar * Breathing  Related Sleep Disorder * Unspecified/Other Depressive Disorder * Schizoaffective Disorder * Schizophrenia * Specific Learning Disorder * Major Depressive Disorder  Flags: High ER utilization  ED: Multiple presentations for various complaints  Inpatient: One hospitalization within last 5 years, in 2021 at [facility]  Outpatient:   [facility] (psychiatry)  [facility] (psychotherapy)  Medications: Buspirone 30 mg BID, Effexor  mg  daily, Klonopin 0.5 mg TID, pregabalin 75 mg BID. Prior med trials on Lexapro, Geodon, Xanax, Haldol, Abilify, Sertraline, Topamax, Trazodone, Zyprexa    ISTOP:  This report was requested by: Ernestina Davis | Reference #: [id number]    Practitioner Count: 1  Pharmacy Count: 1  Current Opioid Prescriptions: 0  Current Benzodiazepine Prescriptions: 0  Current Stimulant Prescriptions: 0      Patient Demographic Information (PDI)       PDI	First Name	Last Name	Birth Date	Gender	Street Address	[facility]	Zip Code  KIKE White	1995	Female	[address]	[facility]	[address]    Prescription Information      PDI Filter:    PDI	My Rx	Current Rx	Drug Type	Rx Written	Rx Dispensed	Drug	Quantity	Days Supply	Prescriber Name	Prescriber STACY #	Payment Method	Dispenser  A	N	Y		05/07/2024	05/11/2024	pregabalin 75 mg capsule	60	30	Donn Price	[id number]	Medicaid	[id number]  A	N	N	B	04/24/2024	04/24/2024	clonazepam 0.5 mg tablet	90	23	Donn Price	[id number]	Medicaid	[id number]  A	N	N		04/02/2024	04/03/2024	pregabalin 75 mg capsule	60	30	Donn Price	[id number]	Medicaid	[id number]  A	N	N	B	04/02/2024	04/03/2024	alprazolam 0.5 mg tablet	45	30	Donn Price	[id number]	Medicaid	[id number]  A	N	N		02/06/2024	02/07/2024	pregabalin 75 mg capsule	60	30	Donn Price	[id number]	Medicaid	[id number]  A	N	N	B	02/06/2024	02/07/2024	alprazolam 0.5 mg tablet	45	30	Donn Price	[id number]	Medicaid	[id number]  A	N	N		01/09/2024	01/10/2024	pregabalin 75 mg capsule	60	30	Donn Price	[id number]	Medicaid	[id number]  A	N	N	B	12/26/2023	12/28/2023	alprazolam 0.5 mg tablet	45	30	Donn Price	[id number]	Medicaid	[id number]  A	N	N	B	10/24/2023	10/26/2023	alprazolam 0.5 mg tablet	45	30	Donn Price	[id number]	Medicaid	[id number]  A	N	N	B	09/19/2023	09/24/2023	alprazolam 0.5 mg tablet	45	30	Donn Price	[id number]	Medicaid	[id number]  A	N	N	O	08/30/2023	09/01/2023	tramadol hcl 50 mg tablet	20	5	Ervin Brady	[id number]	Medicaid	[id number]  A	N	N	B	08/10/2023	08/10/2023	alprazolam 0.5 mg tablet	45	30	Donn Price	[id number]	Medicaid	[id number]  A	N	N	O	07/05/2023	07/05/2023	oxycodone-acetaminophen 5-325 mg tablet	20	4	Ervin Brady	[id number]	Medicaid	[id number]  A	N	N	B	06/13/2023	06/16/2023	alprazolam 0.5 mg tablet	45	30	Donn Price	[id number]	Medicaid	[id number]    *

## 2024-05-25 NOTE — ED BEHAVIORAL HEALTH ASSESSMENT NOTE - RISK ASSESSMENT
Moderate acute risk of self-harm as patient reports ongoing mood symptoms, SI - some ideation to harm self with knife, though patient under frequent supervision with limited access to means of harm, current presentation is patient's baseline, no significant acute triggering factors. Chronic risk is elevated.

## 2024-05-25 NOTE — ED BEHAVIORAL HEALTH NOTE - BEHAVIORAL HEALTH NOTE
========================     FOR EACH COLLATERAL     ========================     NAME: Kathy Brown     NUMBER: [phone]     RELATIONSHIP: Mother     RELIABILITY: Decent     COMMENTS: Mother reports patient always feel like something is wrong with her, has multiple complaints of physical illness, has trouble accepting when she is told nothing is wrong. Has a regular psychiatrist Dr. Donn Price at [facility]. Psychiatrist tried to explain anxiety symptoms to patient but patient not able to cognitively understand due to low IQ. Mother is apprehensive about daughter being admitted, does not want daughter in a psychiatric kim. Patient lives with mother and maternal grandparents. Mother feels patient will be safe if discharged home.        HPI     ========================     BASELINE FUNCTIONING: Mother reports patient is very sweet and caring. 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     DATE HPI STARTED: 5/24/24     DECOMPENSATION:  Member became aggressive and agitated towards mom in ED, been depressed and mathews since onset of menstrual cycle     SUICIDALITY:  Denies history of SI     VIOLENCE:?Denies history of violence.           PAST PSYCHIATRIC HISTORY     ========================     DATE PAST PSYCHIATRIC HISTORY STARTED:  Mother reports member has been dealing with depression for years.    MAIN PSYCHIATRIC DIAGNOSIS: Depression, anxiety    PSYCHIATRIC HOSPITALIZATIONS: Last during COVID (mother unable to recall year, remembers it was in an October)     PRIOR ILLNESS: Sleep Apnea     SUICIDALITY: N/A     VIOLENCE: ?N/A     SUBSTANCE USE: ?N/A           OTHER HISTORY     ==============     CURRENT MEDICATION: ?Buspar, clozapine, norethindrone  DEVELOPMENTAL HISTORY:  IDD

## 2024-05-25 NOTE — ED BEHAVIORAL HEALTH ASSESSMENT NOTE - DIFFERENTIAL
Intellectual disability  Autism spectrum disorder  R/o major depressive disorder vs bipolar disorder  R/o psychosis

## 2024-05-25 NOTE — ED CDU PROVIDER DISPOSITION NOTE - PATIENT PORTAL LINK FT
You can access the FollowMyHealth Patient Portal offered by [facility] by registering at the following website: [email or web address]. By joining [facility]’s FollowMyHealth portal, you will also be able to view your health information using other applications (apps) compatible with our system.

## 2024-05-25 NOTE — ED CDU PROVIDER DISPOSITION NOTE - NSFOLLOWUPINSTRUCTIONS_ED_ALL_ED_FT
Depression    Depression is a mental illness that usually causes feelings of sadness, hopelessness, or helplessness. Some people with this disorder do not feel particularly sad but lose interest in doing things they used to enjoy. Major depressive disorder also can cause physical symptoms. It can interfere with work, school, relationships, and other normal everyday activities. If you were started on a medication, make sure to take exactly as prescribed and follow up with a psychiatrist.    SEEK IMMEDIATE MEDICAL CARE IF YOU HAVE ANY OF THE FOLLOWING SYMPTOMS: thoughts about hurting or killing yourself, thoughts about hurting or killing somebody else, hallucinations, or worsening depression.     Chest Pain    Chest pain can be caused by many different conditions which may or may not be dangerous. Causes include heartburn, lung infections, heart attack, blood clot in lungs, skin infections, strain or damage to muscle, cartilage, or bones, etc. In addition to a history and physical examination, an electrocardiogram (ECG) or other lab tests may have been performed to determine the cause of your chest pain. Follow up with your primary care provider or with a cardiologist as instructed.     SEEK IMMEDIATE MEDICAL CARE IF YOU HAVE ANY OF THE FOLLOWING SYMPTOMS: worsening chest pain, coughing up blood, unexplained back/neck/jaw pain, severe abdominal pain, dizziness or lightheadedness, fainting, shortness of breath, sweaty or clammy skin, vomiting, or racing heart beat. These symptoms may represent a serious problem that is an emergency. Do not wait to see if the symptoms will go away. Get medical help right away. Call 911 and do not drive yourself to the hospital.

## 2024-05-25 NOTE — ED CDU PROVIDER INITIAL DAY NOTE - CLINICAL SUMMARY MEDICAL DECISION MAKING FREE TEXT BOX
28-year-old female history of Mnire's disease, anxiety, depression, bipolar presents with chest pain and shortness of breath.  Patient report chest pain  x 3 days.  Mom report that patient was at the air show and developed chest pain or shortness of breath.  Patient has seen a cardiologist for [facility] with MOCT monitoring on.  mom reported that patient had a full cardiac workup with nuclear stress test and echo October 2023 that was negative.  Mom report that patient has history of anxiety and this appears to be her typical anxiety attack.    Patient's  psychiatric medication was recently adjusted. Patient has left ankle boot and due for surgery of her foot in a few months. medically cleared. reported feeling anxious/hearing voices with SI. to be seen by psych.

## 2024-05-25 NOTE — ED BEHAVIORAL HEALTH ASSESSMENT NOTE - DESCRIPTION
Vital Signs Last 24 Hrs  T(C): 36.9 (25 May 2024 00:32), Max: 37.2 (24 May 2024 16:12)  T(F): 98.5 (25 May 2024 00:32), Max: 98.9 (24 May 2024 16:12)  HR: 116 (25 May 2024 00:32) (116 - 128)  BP: 130/83 (25 May 2024 00:32) (129/84 - 130/83)  BP(mean): 99 (24 May 2024 19:34) (99 - 99)  RR: 18 (25 May 2024 00:32) (18 - 18)  SpO2: 95% (25 May 2024 00:32) (95% - 98%)    Parameters below as of 25 May 2024 00:32  Patient On (Oxygen Delivery Method): room air domiciled with mom and grandparents, on disability denies

## 2024-05-25 NOTE — ED BEHAVIORAL HEALTH ASSESSMENT NOTE - SUMMARY
28 year old female, on disability (OPWDD), domiciled with mother and grandparents, with PMH papilledema, Meniere's disease, PPH intellectual disability, mild-moderate, autism spectrum disorder, anxiety disorder, bipolar disorder vs schizophrenia, 2-3 prior psychiatric hospitalizations (last in 2021 at [facility] for similar presentation), multiple ED presentations, no known hx SA/SIB/violence, BIBEMS a/b walk-in clinic after patient reported chest pain. Patient initially tachycardic, received Ativan 2 mg IV. Patient later reported CAH telling her to kill herself. Psychiatry consulted for evaluation.    On eval, patient is a limited historian - reports ongoing feelings of depression, SI, CAH to harm self ongoing for years, but denies any recent triggers, overall strong wish to be hospitalized. Mother reports current presentation similar to multiple prior presentations, patient feels comforted in hospital setting, has chronically poor frustration tolerance and high health anxiety, overall has appeared to be at baseline. Impression is intellectual disability, mild-moderate, autism spectrum disorder. Despite patient's report of SI and CAH, patient does not demonstrate objective signs of psychosis. She has limited access to means of harm and is under constant supervision at home via disability service staff, her mother and her grandparents, no prior suicide attempts, per mother prior hospitalizations have not been of significant benefit. Patient does not meet criteria for inpatient admission at this time. Patient is psychiatrically cleared for discharge, recommend following up with outpatient psychiatrist and therapist next week. Reviewed safety plan with mother.            Patient seen and evaluated and with worsening depression, and auditory hallucinations to hurt herself. Patient is a danger to self and is to be admitted under DOCS

## 2024-05-25 NOTE — ED ADULT NURSE REASSESSMENT NOTE - NS ED NURSE REASSESS COMMENT FT1
pt noted banging head against wall and hitting head with fists, screaming in hallway, inconsolable. MD Galo notified and ordered IM zyprexa. Pt shortly after got oob and placed self on floor refusing to get up. Screaming continued, security called and charge RN notified. MD Galo at bedside during episode. Pt medicated for safety and unharmed. VSS on RA 1:1 constant obs remains in place

## 2024-05-28 ENCOUNTER — APPOINTMENT (OUTPATIENT)
Dept: OTOLARYNGOLOGY | Facility: CLINIC | Age: 29
End: 2024-05-28

## 2024-05-30 ENCOUNTER — OUTPATIENT (OUTPATIENT)
Dept: OUTPATIENT SERVICES | Facility: HOSPITAL | Age: 29
LOS: 1 days | End: 2024-05-30
Payer: MEDICAID

## 2024-05-30 DIAGNOSIS — G47.33 OBSTRUCTIVE SLEEP APNEA (ADULT) (PEDIATRIC): ICD-10-CM

## 2024-05-30 DIAGNOSIS — G93.2 BENIGN INTRACRANIAL HYPERTENSION: Chronic | ICD-10-CM

## 2024-05-30 DIAGNOSIS — Z98.890 OTHER SPECIFIED POSTPROCEDURAL STATES: Chronic | ICD-10-CM

## 2024-05-30 PROCEDURE — 95811 POLYSOM 6/>YRS CPAP 4/> PARM: CPT | Mod: 26

## 2024-05-30 PROCEDURE — 95811 POLYSOM 6/>YRS CPAP 4/> PARM: CPT

## 2024-06-03 ENCOUNTER — APPOINTMENT (OUTPATIENT)
Dept: PODIATRY | Facility: CLINIC | Age: 29
End: 2024-06-03

## 2024-06-05 ENCOUNTER — APPOINTMENT (OUTPATIENT)
Dept: FAMILY MEDICINE | Facility: CLINIC | Age: 29
End: 2024-06-05
Payer: MEDICAID

## 2024-06-05 VITALS
DIASTOLIC BLOOD PRESSURE: 74 MMHG | HEIGHT: 60 IN | HEART RATE: 115 BPM | WEIGHT: 273.2 LBS | BODY MASS INDEX: 53.64 KG/M2 | SYSTOLIC BLOOD PRESSURE: 126 MMHG | TEMPERATURE: 97.3 F | OXYGEN SATURATION: 95 %

## 2024-06-05 DIAGNOSIS — Z01.818 ENCOUNTER FOR OTHER PREPROCEDURAL EXAMINATION: ICD-10-CM

## 2024-06-05 PROCEDURE — 99214 OFFICE O/P EST MOD 30 MIN: CPT

## 2024-06-05 RX ORDER — VENLAFAXINE HYDROCHLORIDE 150 MG/1
150 CAPSULE, EXTENDED RELEASE ORAL
Qty: 30 | Refills: 0 | Status: ACTIVE | COMMUNITY
Start: 2024-05-07

## 2024-06-05 RX ORDER — ALPRAZOLAM 0.25 MG/1
0.25 TABLET ORAL 4 TIMES DAILY
Refills: 0 | Status: DISCONTINUED | COMMUNITY
Start: 2020-11-07 | End: 2024-06-05

## 2024-06-05 RX ORDER — ESCITALOPRAM OXALATE 20 MG/1
20 TABLET ORAL
Qty: 30 | Refills: 0 | Status: DISCONTINUED | COMMUNITY
Start: 2022-05-02 | End: 2024-06-05

## 2024-06-05 RX ORDER — LORAZEPAM 0.5 MG/1
0.5 TABLET ORAL
Qty: 60 | Refills: 0 | Status: ACTIVE | COMMUNITY
Start: 2024-05-28

## 2024-06-05 NOTE — ASSESSMENT
[Patient Optimized for Surgery] : Patient optimized for surgery [No Further Testing Recommended] : no further testing recommended [Modify medications prior to procedure] : Modify medications prior to procedure [As per surgery] : as per surgery [FreeTextEntry4] : 29yo female with PMH of vertigo and suspected Meniere's disease, asthma, anxiety, developmental disability who presents for preoperative examination. Planned for foot surgery with Dr. Campos on 6/7/24 at [facility].   - PST labs and ECG obtained and personally reviewed - Labs are all within normal limits - ECG shows NSR, rate 91. No significant changes compared to prior ECGs - Received cardiac clearance from Dr. Johnie Perez - No adverse anesthesia reactions in the past  - Able to perform >4 METs at baseline  - At this time there are no acute medical contraindications to procedure and patient is medically optimized to proceed with planned procedure  [FreeTextEntry7] : hold NSAIDs prior to surgery

## 2024-06-05 NOTE — HISTORY OF PRESENT ILLNESS
[(Patient denies any chest pain, claudication, dyspnea on exertion, orthopnea, palpitations or syncope)] : Patient denies any chest pain, claudication, dyspnea on exertion, orthopnea, palpitations or syncope [Good (7-10 METs)] : Good (7-10 METs) [Aortic Stenosis] : no aortic stenosis [Atrial Fibrillation] : no atrial fibrillation [Coronary Artery Disease] : no coronary artery disease [Recent Myocardial Infarction] : no recent myocardial infarction [Implantable Device/Pacemaker] : no implantable device/pacemaker [Asthma] : no asthma [COPD] : no COPD [Sleep Apnea] : no sleep apnea [Smoker] : not a smoker [Family Member] : no family member with adverse anesthesia reaction/sudden death [Self] : no previous adverse anesthesia reaction [Chronic Anticoagulation] : no chronic anticoagulation [Chronic Kidney Disease] : no chronic kidney disease [Diabetes] : no diabetes [FreeTextEntry1] : Foot surgery [FreeTextEntry2] : 6/7/24 [FreeTextEntry3] : Dr. Campos [FreeTextEntry4] : 27yo female with PMH of vertigo and suspected Meniere's disease, asthma, anxiety, developmental disability who presents for preoperative examination.   Patient with history of left ankle injury, found to have extra bone in the foot with heel spur which needs to be removed. Scheduled for foot surgery at [facility] on 6/7/24 with Dr. Campos.  [FreeTextEntry7] : Received cardiac clearance from Dr. Perez

## 2024-06-05 NOTE — REVIEW OF SYSTEMS
[Joint Pain] : joint pain [Fever] : no fever [Chills] : no chills [Nasal Discharge] : no nasal discharge [Sore Throat] : no sore throat [Chest Pain] : no chest pain [Palpitations] : no palpitations [Cough] : no cough [Abdominal Pain] : no abdominal pain [Nausea] : no nausea [Constipation] : no constipation [Diarrhea] : diarrhea [Vomiting] : no vomiting [Heartburn] : no heartburn

## 2024-06-05 NOTE — PHYSICAL EXAM
[No Acute Distress] : no acute distress [Well-Appearing] : well-appearing [No Lymphadenopathy] : no lymphadenopathy [Supple] : supple [No Respiratory Distress] : no respiratory distress  [Clear to Auscultation] : lungs were clear to auscultation bilaterally [Normal Rate] : normal rate  [Regular Rhythm] : with a regular rhythm [Normal S1, S2] : normal S1 and S2 [Soft] : abdomen soft [Non Tender] : non-tender [No Rash] : no rash [Coordination Grossly Intact] : coordination grossly intact [No Focal Deficits] : no focal deficits

## 2024-07-11 ENCOUNTER — APPOINTMENT (OUTPATIENT)
Dept: OTOLARYNGOLOGY | Facility: CLINIC | Age: 29
End: 2024-07-11

## 2024-07-16 ENCOUNTER — RX RENEWAL (OUTPATIENT)
Age: 29
End: 2024-07-16

## 2024-07-17 ENCOUNTER — APPOINTMENT (OUTPATIENT)
Dept: PULMONOLOGY | Facility: CLINIC | Age: 29
End: 2024-07-17
Payer: MEDICAID

## 2024-07-17 VITALS
OXYGEN SATURATION: 95 % | HEART RATE: 102 BPM | RESPIRATION RATE: 16 BRPM | SYSTOLIC BLOOD PRESSURE: 112 MMHG | DIASTOLIC BLOOD PRESSURE: 80 MMHG

## 2024-07-17 VITALS — WEIGHT: 268 LBS | HEIGHT: 60 IN | BODY MASS INDEX: 52.61 KG/M2

## 2024-07-17 DIAGNOSIS — G47.33 OBSTRUCTIVE SLEEP APNEA (ADULT) (PEDIATRIC): ICD-10-CM

## 2024-07-17 DIAGNOSIS — J45.909 UNSPECIFIED ASTHMA, UNCOMPLICATED: ICD-10-CM

## 2024-07-17 DIAGNOSIS — F84.0 AUTISTIC DISORDER: ICD-10-CM

## 2024-07-17 DIAGNOSIS — E66.01 MORBID (SEVERE) OBESITY DUE TO EXCESS CALORIES: ICD-10-CM

## 2024-07-17 DIAGNOSIS — R06.00 DYSPNEA, UNSPECIFIED: ICD-10-CM

## 2024-07-17 PROCEDURE — G2211 COMPLEX E/M VISIT ADD ON: CPT | Mod: NC

## 2024-07-17 PROCEDURE — 99214 OFFICE O/P EST MOD 30 MIN: CPT

## 2024-09-10 ENCOUNTER — NON-APPOINTMENT (OUTPATIENT)
Age: 29
End: 2024-09-10

## 2024-09-16 ENCOUNTER — APPOINTMENT (OUTPATIENT)
Dept: FAMILY MEDICINE | Facility: CLINIC | Age: 29
End: 2024-09-16
Payer: MEDICAID

## 2024-09-16 VITALS
TEMPERATURE: 98 F | OXYGEN SATURATION: 98 % | HEART RATE: 134 BPM | HEIGHT: 60 IN | DIASTOLIC BLOOD PRESSURE: 74 MMHG | SYSTOLIC BLOOD PRESSURE: 118 MMHG

## 2024-09-16 DIAGNOSIS — Z23 ENCOUNTER FOR IMMUNIZATION: ICD-10-CM

## 2024-09-16 DIAGNOSIS — M79.89 OTHER SPECIFIED SOFT TISSUE DISORDERS: ICD-10-CM

## 2024-09-16 PROCEDURE — 90656 IIV3 VACC NO PRSV 0.5 ML IM: CPT

## 2024-09-16 PROCEDURE — 99213 OFFICE O/P EST LOW 20 MIN: CPT | Mod: 25

## 2024-09-16 PROCEDURE — G2211 COMPLEX E/M VISIT ADD ON: CPT | Mod: NC

## 2024-09-16 PROCEDURE — G0008: CPT

## 2024-09-16 NOTE — REVIEW OF SYSTEMS
[Lower Ext Edema] : lower extremity edema [Muscle Pain] : muscle pain [Fever] : no fever [Chills] : no chills [Chest Pain] : no chest pain [Palpitations] : no palpitations [Shortness Of Breath] : no shortness of breath [Headache] : no headache [Dizziness] : no dizziness

## 2024-09-16 NOTE — HISTORY OF PRESENT ILLNESS
[FreeTextEntry8] : 28yo female presenting to the office complaining of leg swelling and pain.  Noticed swelling in her right lower leg and a lump on her left upper thigh for about three weeks. Denies any injury to the area. Feels like swelling has increased in size. Notes it is painful.   She did have surgery on her left ankle in June 2024, however, she has suffered an Achilles tendon injury and will have to have surgery on the leg in November.  She reports she has been trying to be active. Has lost weight intentionally since last visit.

## 2024-09-16 NOTE — PHYSICAL EXAM
[No Acute Distress] : no acute distress [Well-Appearing] : well-appearing [No Extremity Clubbing/Cyanosis] : no extremity clubbing/cyanosis [de-identified] : nonpitting edema of right lower extremity [de-identified] : enlarged lymph node in left groin

## 2024-09-16 NOTE — ASSESSMENT
[FreeTextEntry1] : #Leg swelling - Presenting to the office complaining of right lower extremity swelling for the past three weeks - Possibly from Baker's cyst vs venous blood clot given recent surgery - Will order ultrasound of lower extremity to exclude  - For leg cramping sensation, increase PO fluids and add in gatorade electrolyte supplement - Swelling in left upper thigh is likely from lymph node, monitor for now   #Flu vaccine - Administered flu vaccination to left deltoid, patient tolerated well  SBO (small bowel obstruction)

## 2024-09-19 ENCOUNTER — NON-APPOINTMENT (OUTPATIENT)
Age: 29
End: 2024-09-19

## 2024-09-19 ENCOUNTER — APPOINTMENT (OUTPATIENT)
Dept: OTOLARYNGOLOGY | Facility: CLINIC | Age: 29
End: 2024-09-19

## 2024-09-19 ENCOUNTER — OUTPATIENT (OUTPATIENT)
Dept: OUTPATIENT SERVICES | Facility: HOSPITAL | Age: 29
LOS: 1 days | End: 2024-09-19
Payer: MEDICAID

## 2024-09-19 ENCOUNTER — APPOINTMENT (OUTPATIENT)
Dept: ULTRASOUND IMAGING | Facility: CLINIC | Age: 29
End: 2024-09-19
Payer: MEDICAID

## 2024-09-19 DIAGNOSIS — Z00.8 ENCOUNTER FOR OTHER GENERAL EXAMINATION: ICD-10-CM

## 2024-09-19 DIAGNOSIS — Z98.890 OTHER SPECIFIED POSTPROCEDURAL STATES: Chronic | ICD-10-CM

## 2024-09-19 DIAGNOSIS — G93.2 BENIGN INTRACRANIAL HYPERTENSION: Chronic | ICD-10-CM

## 2024-09-19 PROCEDURE — 93971 EXTREMITY STUDY: CPT

## 2024-09-19 PROCEDURE — 93971 EXTREMITY STUDY: CPT | Mod: 26,RT

## 2024-10-03 ENCOUNTER — OFFICE (OUTPATIENT)
Dept: URBAN - METROPOLITAN AREA CLINIC 12 | Facility: CLINIC | Age: 29
Setting detail: OPHTHALMOLOGY
End: 2024-10-03
Payer: COMMERCIAL

## 2024-10-03 DIAGNOSIS — H05.113: ICD-10-CM

## 2024-10-03 DIAGNOSIS — G43.009: ICD-10-CM

## 2024-10-03 DIAGNOSIS — H52.03: ICD-10-CM

## 2024-10-03 PROCEDURE — 92015 DETERMINE REFRACTIVE STATE: CPT | Performed by: STUDENT IN AN ORGANIZED HEALTH CARE EDUCATION/TRAINING PROGRAM

## 2024-10-03 PROCEDURE — 92014 COMPRE OPH EXAM EST PT 1/>: CPT | Performed by: STUDENT IN AN ORGANIZED HEALTH CARE EDUCATION/TRAINING PROGRAM

## 2024-10-03 ASSESSMENT — TONOMETRY
OD_IOP_MMHG: 15
OS_IOP_MMHG: 15

## 2024-10-03 ASSESSMENT — CONFRONTATIONAL VISUAL FIELD TEST (CVF)
OD_FINDINGS: FULL
OS_FINDINGS: FULL

## 2024-10-04 ASSESSMENT — REFRACTION_AUTOREFRACTION
OS_CYLINDER: -2.75
OS_SPHERE: +4.25
OS_AXIS: 176
OD_SPHERE: +3.25
OD_AXIS: 003
OD_CYLINDER: -1.50

## 2024-10-04 ASSESSMENT — REFRACTION_MANIFEST
OD_AXIS: 005
OS_SPHERE: +3.00
OS_SPHERE: +3.75
OS_CYLINDER: -2.50
OD_SPHERE: +3.00
OD_CYLINDER: -1.50
OS_VA1: 20/30
OD_VA1: 20/25-2
OS_VA1: 20/40+1
OS_CYLINDER: -2.75
OS_AXIS: 180
OD_SPHERE: +2.25
OD_AXIS: 180
OD_CYLINDER: -1.75
OS_AXIS: 005
OD_VA1: 20/25

## 2024-10-04 ASSESSMENT — REFRACTION_CURRENTRX
OS_OVR_VA: 20/
OS_VPRISM_DIRECTION: SV
OD_CYLINDER: -2.25
OD_AXIS: 003
OS_CYLINDER: -2.25
OS_CYLINDER: -2.75
OD_AXIS: 004
OD_SPHERE: +3.50
OS_VPRISM_DIRECTION: SV
OD_OVR_VA: 20/
OS_SPHERE: +3.00
OS_AXIS: 003
OS_SPHERE: +3.50
OD_SPHERE: +2.25
OD_OVR_VA: 20/
OD_VPRISM_DIRECTION: SV
OD_VPRISM_DIRECTION: SV
OS_OVR_VA: 20/
OD_CYLINDER: -1.75
OS_AXIS: 175

## 2024-10-04 ASSESSMENT — KERATOMETRY
OD_AXISANGLE_DEGREES: 091
OS_AXISANGLE_DEGREES: 085
OD_K2POWER_DIOPTERS: 47.25
OS_K1POWER_DIOPTERS: 45.25
OD_K1POWER_DIOPTERS: 45.50
OS_K2POWER_DIOPTERS: 48.00

## 2024-10-04 ASSESSMENT — VISUAL ACUITY
OS_BCVA: 20/40-2
OD_BCVA: 20/50-

## 2024-10-07 ENCOUNTER — APPOINTMENT (OUTPATIENT)
Dept: FAMILY MEDICINE | Facility: CLINIC | Age: 29
End: 2024-10-07
Payer: MEDICAID

## 2024-10-07 VITALS
HEIGHT: 60 IN | HEART RATE: 110 BPM | DIASTOLIC BLOOD PRESSURE: 64 MMHG | BODY MASS INDEX: 52.13 KG/M2 | SYSTOLIC BLOOD PRESSURE: 128 MMHG | TEMPERATURE: 98.2 F | OXYGEN SATURATION: 99 % | WEIGHT: 265.5 LBS

## 2024-10-07 DIAGNOSIS — R05.9 COUGH, UNSPECIFIED: ICD-10-CM

## 2024-10-07 DIAGNOSIS — R06.00 DYSPNEA, UNSPECIFIED: ICD-10-CM

## 2024-10-07 DIAGNOSIS — R00.0 TACHYCARDIA, UNSPECIFIED: ICD-10-CM

## 2024-10-07 DIAGNOSIS — R06.09 OTHER FORMS OF DYSPNEA: ICD-10-CM

## 2024-10-07 PROCEDURE — 99214 OFFICE O/P EST MOD 30 MIN: CPT

## 2024-10-07 RX ORDER — GUAIFENESIN AND CODEINE PHOSPHATE 10; 100 MG/5ML; MG/5ML
100-10 SOLUTION ORAL
Qty: 120 | Refills: 0 | Status: ACTIVE | COMMUNITY
Start: 2024-10-07 | End: 1900-01-01

## 2024-10-15 ENCOUNTER — APPOINTMENT (OUTPATIENT)
Dept: FAMILY MEDICINE | Facility: CLINIC | Age: 29
End: 2024-10-15
Payer: MEDICAID

## 2024-10-15 VITALS
DIASTOLIC BLOOD PRESSURE: 72 MMHG | TEMPERATURE: 97.7 F | SYSTOLIC BLOOD PRESSURE: 118 MMHG | WEIGHT: 268 LBS | BODY MASS INDEX: 52.61 KG/M2 | HEIGHT: 60 IN | HEART RATE: 102 BPM | OXYGEN SATURATION: 98 %

## 2024-10-15 PROCEDURE — 99213 OFFICE O/P EST LOW 20 MIN: CPT

## 2024-10-15 PROCEDURE — G2211 COMPLEX E/M VISIT ADD ON: CPT | Mod: NC

## 2024-10-16 ENCOUNTER — APPOINTMENT (OUTPATIENT)
Dept: PULMONOLOGY | Facility: CLINIC | Age: 29
End: 2024-10-16

## 2024-10-21 ENCOUNTER — APPOINTMENT (OUTPATIENT)
Dept: ULTRASOUND IMAGING | Facility: CLINIC | Age: 29
End: 2024-10-21
Payer: MEDICAID

## 2024-10-21 ENCOUNTER — OUTPATIENT (OUTPATIENT)
Dept: OUTPATIENT SERVICES | Facility: HOSPITAL | Age: 29
LOS: 1 days | End: 2024-10-21
Payer: MEDICAID

## 2024-10-21 DIAGNOSIS — R10.11 RIGHT UPPER QUADRANT PAIN: ICD-10-CM

## 2024-10-21 DIAGNOSIS — G93.2 BENIGN INTRACRANIAL HYPERTENSION: Chronic | ICD-10-CM

## 2024-10-21 DIAGNOSIS — Z98.890 OTHER SPECIFIED POSTPROCEDURAL STATES: Chronic | ICD-10-CM

## 2024-10-21 PROCEDURE — 76705 ECHO EXAM OF ABDOMEN: CPT

## 2024-10-21 PROCEDURE — 76705 ECHO EXAM OF ABDOMEN: CPT | Mod: 26

## 2024-10-24 ENCOUNTER — APPOINTMENT (OUTPATIENT)
Dept: SURGERY | Facility: CLINIC | Age: 29
End: 2024-10-24

## 2024-10-24 VITALS
TEMPERATURE: 98.2 F | HEIGHT: 61 IN | HEART RATE: 104 BPM | RESPIRATION RATE: 16 BRPM | SYSTOLIC BLOOD PRESSURE: 110 MMHG | WEIGHT: 266 LBS | DIASTOLIC BLOOD PRESSURE: 75 MMHG | BODY MASS INDEX: 50.22 KG/M2 | OXYGEN SATURATION: 97 %

## 2024-10-24 DIAGNOSIS — K80.20 CALCULUS OF GALLBLADDER W/OUT CHOLECYSTITIS W/OUT OBSTRUCTION: ICD-10-CM

## 2024-10-24 PROCEDURE — 99204 OFFICE O/P NEW MOD 45 MIN: CPT

## 2024-10-24 RX ORDER — PREGABALIN 75 MG/1
75 CAPSULE ORAL
Refills: 0 | Status: ACTIVE | COMMUNITY

## 2024-10-24 RX ORDER — LORAZEPAM 0.5 MG/1
0.5 TABLET ORAL
Refills: 0 | Status: ACTIVE | COMMUNITY

## 2024-10-30 ENCOUNTER — APPOINTMENT (OUTPATIENT)
Dept: FAMILY MEDICINE | Facility: CLINIC | Age: 29
End: 2024-10-30

## 2024-11-04 ENCOUNTER — APPOINTMENT (OUTPATIENT)
Dept: FAMILY MEDICINE | Facility: CLINIC | Age: 29
End: 2024-11-04
Payer: MEDICAID

## 2024-11-04 VITALS
TEMPERATURE: 99.8 F | HEART RATE: 120 BPM | BODY MASS INDEX: 55.32 KG/M2 | HEIGHT: 61 IN | OXYGEN SATURATION: 99 % | SYSTOLIC BLOOD PRESSURE: 132 MMHG | RESPIRATION RATE: 14 BRPM | DIASTOLIC BLOOD PRESSURE: 78 MMHG | WEIGHT: 293 LBS

## 2024-11-04 DIAGNOSIS — Z13.228 ENCOUNTER FOR SCREENING FOR OTHER SUSPECTED ENDOCRINE DISORDER: ICD-10-CM

## 2024-11-04 DIAGNOSIS — M25.562 PAIN IN LEFT KNEE: ICD-10-CM

## 2024-11-04 DIAGNOSIS — Z00.00 ENCOUNTER FOR GENERAL ADULT MEDICAL EXAMINATION W/OUT ABNORMAL FINDINGS: ICD-10-CM

## 2024-11-04 DIAGNOSIS — Z13.29 ENCOUNTER FOR SCREENING FOR OTHER SUSPECTED ENDOCRINE DISORDER: ICD-10-CM

## 2024-11-04 DIAGNOSIS — Z13.0 ENCOUNTER FOR SCREENING FOR OTHER SUSPECTED ENDOCRINE DISORDER: ICD-10-CM

## 2024-11-04 DIAGNOSIS — H60.509 UNSPECIFIED ACUTE NONINFECTIVE OTITIS EXTERNA, UNSPECIFIED EAR: ICD-10-CM

## 2024-11-04 PROCEDURE — 99395 PREV VISIT EST AGE 18-39: CPT

## 2024-11-04 PROCEDURE — 99214 OFFICE O/P EST MOD 30 MIN: CPT | Mod: 25

## 2024-11-04 RX ORDER — OFLOXACIN OTIC 3 MG/ML
0.3 SOLUTION AURICULAR (OTIC) TWICE DAILY
Qty: 1 | Refills: 0 | Status: ACTIVE | COMMUNITY
Start: 2024-11-04 | End: 1900-01-01

## 2024-11-06 LAB
ALBUMIN SERPL ELPH-MCNC: 4.2 G/DL
ALP BLD-CCNC: 96 U/L
ALT SERPL-CCNC: 10 U/L
ANION GAP SERPL CALC-SCNC: 13 MMOL/L
AST SERPL-CCNC: 11 U/L
BASOPHILS # BLD AUTO: 0.04 K/UL
BASOPHILS NFR BLD AUTO: 0.4 %
BILIRUB SERPL-MCNC: <0.2 MG/DL
BUN SERPL-MCNC: 9 MG/DL
CALCIUM SERPL-MCNC: 9.4 MG/DL
CHLORIDE SERPL-SCNC: 104 MMOL/L
CHOLEST SERPL-MCNC: 134 MG/DL
CO2 SERPL-SCNC: 23 MMOL/L
CREAT SERPL-MCNC: 0.75 MG/DL
EGFR: 112 ML/MIN/1.73M2
EOSINOPHIL # BLD AUTO: 0.15 K/UL
EOSINOPHIL NFR BLD AUTO: 1.6 %
ESTIMATED AVERAGE GLUCOSE: 117 MG/DL
GLUCOSE SERPL-MCNC: 113 MG/DL
HBA1C MFR BLD HPLC: 5.7 %
HCT VFR BLD CALC: 43.1 %
HDLC SERPL-MCNC: 39 MG/DL
HGB BLD-MCNC: 13.1 G/DL
IMM GRANULOCYTES NFR BLD AUTO: 0.4 %
LDLC SERPL CALC-MCNC: 75 MG/DL
LYMPHOCYTES # BLD AUTO: 2.76 K/UL
LYMPHOCYTES NFR BLD AUTO: 28.8 %
MAN DIFF?: NORMAL
MCHC RBC-ENTMCNC: 25.3 PG
MCHC RBC-ENTMCNC: 30.4 G/DL
MCV RBC AUTO: 83.2 FL
MONOCYTES # BLD AUTO: 0.49 K/UL
MONOCYTES NFR BLD AUTO: 5.1 %
NEUTROPHILS # BLD AUTO: 6.1 K/UL
NEUTROPHILS NFR BLD AUTO: 63.7 %
NONHDLC SERPL-MCNC: 95 MG/DL
PLATELET # BLD AUTO: 422 K/UL
POTASSIUM SERPL-SCNC: 4.3 MMOL/L
PROT SERPL-MCNC: 7 G/DL
RBC # BLD: 5.18 M/UL
RBC # FLD: 16 %
SODIUM SERPL-SCNC: 140 MMOL/L
TRIGL SERPL-MCNC: 108 MG/DL
TSH SERPL-ACNC: 0.85 UIU/ML
WBC # FLD AUTO: 9.58 K/UL

## 2024-11-08 ENCOUNTER — NON-APPOINTMENT (OUTPATIENT)
Age: 29
End: 2024-11-08

## 2024-11-11 ENCOUNTER — NON-APPOINTMENT (OUTPATIENT)
Age: 29
End: 2024-11-11

## 2024-11-15 ENCOUNTER — APPOINTMENT (OUTPATIENT)
Dept: FAMILY MEDICINE | Facility: CLINIC | Age: 29
End: 2024-11-15
Payer: MEDICAID

## 2024-11-15 VITALS
SYSTOLIC BLOOD PRESSURE: 118 MMHG | WEIGHT: 275 LBS | DIASTOLIC BLOOD PRESSURE: 78 MMHG | BODY MASS INDEX: 51.92 KG/M2 | OXYGEN SATURATION: 98 % | HEART RATE: 115 BPM | TEMPERATURE: 97.6 F | HEIGHT: 61 IN

## 2024-11-15 DIAGNOSIS — Z01.818 ENCOUNTER FOR OTHER PREPROCEDURAL EXAMINATION: ICD-10-CM

## 2024-11-15 DIAGNOSIS — M76.60 ACHILLES TENDINITIS, UNSPECIFIED LEG: ICD-10-CM

## 2024-11-15 PROCEDURE — 99214 OFFICE O/P EST MOD 30 MIN: CPT

## 2024-12-26 ENCOUNTER — APPOINTMENT (OUTPATIENT)
Dept: OTOLARYNGOLOGY | Facility: CLINIC | Age: 29
End: 2024-12-26

## 2025-01-23 ENCOUNTER — NON-APPOINTMENT (OUTPATIENT)
Age: 30
End: 2025-01-23

## 2025-01-23 ENCOUNTER — APPOINTMENT (OUTPATIENT)
Dept: OTOLARYNGOLOGY | Facility: CLINIC | Age: 30
End: 2025-01-23

## 2025-01-28 ENCOUNTER — NON-APPOINTMENT (OUTPATIENT)
Age: 30
End: 2025-01-28

## 2025-01-28 ENCOUNTER — EMERGENCY (EMERGENCY)
Facility: HOSPITAL | Age: 30
LOS: 1 days | Discharge: HOME | End: 2025-01-28
Attending: STUDENT IN AN ORGANIZED HEALTH CARE EDUCATION/TRAINING PROGRAM
Payer: COMMERCIAL

## 2025-01-28 VITALS
OXYGEN SATURATION: 100 % | SYSTOLIC BLOOD PRESSURE: 140 MMHG | TEMPERATURE: 98 F | DIASTOLIC BLOOD PRESSURE: 62 MMHG | HEIGHT: 65 IN | WEIGHT: 149.91 LBS | HEART RATE: 123 BPM | RESPIRATION RATE: 18 BRPM

## 2025-01-28 VITALS
SYSTOLIC BLOOD PRESSURE: 115 MMHG | DIASTOLIC BLOOD PRESSURE: 75 MMHG | OXYGEN SATURATION: 95 % | RESPIRATION RATE: 18 BRPM | HEART RATE: 114 BPM | TEMPERATURE: 99 F

## 2025-01-28 DIAGNOSIS — Z98.890 OTHER SPECIFIED POSTPROCEDURAL STATES: Chronic | ICD-10-CM

## 2025-01-28 DIAGNOSIS — G93.2 BENIGN INTRACRANIAL HYPERTENSION: Chronic | ICD-10-CM

## 2025-01-28 PROCEDURE — 93971 EXTREMITY STUDY: CPT | Mod: 26,RT

## 2025-01-28 PROCEDURE — 93005 ELECTROCARDIOGRAM TRACING: CPT

## 2025-01-28 PROCEDURE — 99284 EMERGENCY DEPT VISIT MOD MDM: CPT

## 2025-01-28 PROCEDURE — 93010 ELECTROCARDIOGRAM REPORT: CPT

## 2025-01-28 PROCEDURE — 93971 EXTREMITY STUDY: CPT

## 2025-01-28 PROCEDURE — 99284 EMERGENCY DEPT VISIT MOD MDM: CPT | Mod: 25

## 2025-01-28 NOTE — ED PROVIDER NOTE - PATIENT PORTAL LINK FT
You can access the FollowMyHealth Patient Portal offered by [facility] by registering at the following website: [email or web address]. By joining [employer]’s FollowMyHealth portal, you will also be able to view your health information using other applications (apps) compatible with our system.

## 2025-01-28 NOTE — ED PROVIDER NOTE - PHYSICAL EXAMINATION
General: NAD, cheerful  HEENT: Normocephalic, atraumatic, EOM intact  Neck: No apparent stiffness or JVD  Pulm: Chest wall symmetric and nontender, lungs clear to ascultation diminished bilaterally, no wheezes, rhales, NÉSTOR  Cardiac: Regular rate and regular rhythm, 2+ radial pulses bilaterally  Abdomen: Soft, nontender, nondistended  Skin: Skin is warm, dry and intact without rashes or lesions.  Neuro: No motor or sensory deficits above reported baseline, AOx3, no facial droop, no dysarthria, moves all extremities, symmetric sensation to bilateral extremities   MSK: RLE TTP in posterior calf, non erythematous, RLE > LLE, non-pitting edema

## 2025-01-28 NOTE — ED PROVIDER NOTE - CLINICAL SUMMARY MEDICAL DECISION MAKING FREE TEXT BOX
28-year-old female history of Meniere's disease, anxiety, depression, bipolar disorder presenting with RLE swelling. On exam pt tachycardic (baseline), normotensive, RLE> LLE, R calf TTP, non-erythematous, palpable DP.

## 2025-01-28 NOTE — ED PROVIDER NOTE - ATTENDING CONTRIBUTION TO CARE
28 yof pmh anxiety, intellectual delay, bipolar here with RLE swelling and pain. recently with URI symptoms and which she saw urgent care for last week, has been more sedentary than usual in last few days. No trauma. no history of blood clots. Denies fever, n/v, cp, sob, abd pain  Ap - mother reports she is usually tachycardic and has seen cardiologist in past. denies cp/sob to suggest PE.  will get US r/o DVT

## 2025-01-28 NOTE — ED ADULT NURSE NOTE - OBJECTIVE STATEMENT
Pt presents to ED with c.o of 7/10 pain to right calf x 1 week. Pt denies falls/trauma. Site appears to have slight swelling but no redness or warmth noted. Pt states she was sick last week and has been in bed. Pt was at urgent care prior ED visit. Pt denies chest pain/sob , respirations are even and unlabored. Pt A&Ox3, family at bedside.

## 2025-01-28 NOTE — ED PROVIDER NOTE - NSFOLLOWUPINSTRUCTIONS_ED_ALL_ED_FT
- Please take all medications as prescribed   - Please make an appointment with your primary care physician within the next 2-3 days and bring all your results with you   - If your symptoms worsen, please return to ED for evaluation    Muscle Pain, Adult  Muscle pain, also called myalgia, is a condition in which a person has pain in one or more muscles in the body. The pain may be mild, moderate, or severe. It may feel sharp, achy, or burning. In most cases, the pain lasts only a short time and goes away on its own.    It is normal to feel some muscle pain after you start a new exercise program. Muscles that have not been used a lot will be sore at first.    What are the causes?  You may have muscle pain when you use your muscles in a new or different way after not having used them for some time. Muscle pain can also be caused by overuse or by stretching a muscle beyond its normal length (muscle strain). You may be more likely to have muscle pain if you are not in shape.    Other causes may include:  Injury or bruising.  Infectious diseases. These include diseases caused by viruses, such as the flu (influenza).  Fibromyalgia. This is a long-term (chronic) condition that causes muscle tenderness, tiredness (fatigue), and headache.  Autoimmune or rheumatologic diseases. These are conditions, such as lupus, that cause the body's defense system (immune system) to attack areas in the body.  Certain medicines. These include ACE inhibitors and statins.  What are the signs or symptoms?  The main symptom is sore or painful muscles. Your muscles may be sore when you do activities and when you stretch. You may also have slight swelling.    How is this diagnosed?  Muscle pain is diagnosed with a physical exam. Your health care provider will ask questions about your pain and when it began.  If you have not had muscle pain for very long, your provider may want to wait before doing much testing.  If your muscle pain has lasted a long time, tests may be done right away.  In some cases, you may need tests to rule out other conditions and diseases.  How is this treated?  Treatment for muscle pain depends on the cause. Home care is usually enough to relieve the pain. Your provider may also prescribe NSAIDs, such as ibuprofen.    Follow these instructions at home:  Medicines    Take over-the-counter and prescription medicines only as told by your provider.  Ask your health care provider if the medicine prescribed to you requires you to avoid driving or using machinery.  Managing pain, swelling, and discomfort    Bag of ice on a towel on the skin.  A heating pad for use on the affected area.  If told, put ice on the painful area for the first 2 days of soreness.  Put ice in a plastic bag.  Place a towel between your skin and the bag.  Leave the ice on for 20 minutes, 2–3 times a day.  If your skin turns bright red, remove the ice right away to prevent skin damage. The risk of damage is higher if you cannot feel pain, heat, or cold.  For the first 2 days of muscle soreness, or if there is swelling:  Do not soak in hot baths.  Do not use a hot tub, steam room, sauna, heating pad, or other heat source.  After 2-3 days, you may switch between putting ice and heat on the area. If told, apply heat to the affected area as often as told by your provider. Use the heat source that your recommends, such as a moist heat pack or a heating pad.  Place a towel between your skin and the heat source.  Leave the heat on for 20–30 minutes.  If your skin turns bright red, remove the ice or heat right away to prevent skin damage. The risk of damage is higher if you cannot feel pain, heat, or cold.  If you have an injury, raise (elevate) the injured area above the level of your heart while you are sitting or lying down.  Activity    A person standing with arms stretched straight out in front and then squatting while keeping the knees over the toes.  If your muscle pain is caused by overuse:  Slow down your activities until the pain goes away.  Do regular, gentle exercises if you are not normally active.  Warm up before you exercise. Stretch before and after you exercise. This can help lower the risk of muscle pain.  Do not keep working out if the pain is severe. Severe pain could mean that you have injured a muscle.  You may have to avoid lifting. Ask your provider how much you can safely lift.  Return to your normal activities as told by your provider. Ask your provider what activities are safe for you.  General instructions    Do not use any products that contain nicotine or tobacco. These products include cigarettes, chewing tobacco, and vaping devices, such as e-cigarettes. If you need help quitting, ask your provider.  Contact a health care provider if:  Your muscle pain gets worse, and medicines do not help.  The muscle pain lasts longer than 3 days.  You have a rash or fever.  You have muscle pain after a tick bite.  You have muscle pain when you work out, even though you are in good shape.  You have redness, soreness, or swelling.  You have muscle pain after you start a new medicine or change the dose of a medicine.  Get help right away if:  You have trouble breathing.  You have trouble swallowing.  You have muscle pain along with a stiff neck, fever, and vomiting.  You have severe muscle weakness, or you cannot move part of your body.  You are urinating less, or you have dark, bloody, or discolored urine.  You have redness or swelling at the site of the muscle pain.  These symptoms may be an emergency. Get help right away. Call 911.  Do not wait to see if the symptoms will go away.  Do not drive yourself to the hospital.  This information is not intended to replace advice given to you by your health care provider. Make sure you discuss any questions you have with your health care provider.

## 2025-01-28 NOTE — ED ADULT NURSE NOTE - NSFALLUNIVINTERV_ED_ALL_ED
Bed/Stretcher in lowest position, wheels locked, appropriate side rails in place/Call bell, personal items and telephone in reach/Instruct patient to call for assistance before getting out of bed/chair/stretcher/Non-slip footwear applied when patient is off stretcher/[place] to call system/Physically safe environment - no spills, clutter or unnecessary equipment/Purposeful proactive rounding/Room/bathroom lighting operational, light cord in reach

## 2025-01-28 NOTE — ED ADULT TRIAGE NOTE - CHIEF COMPLAINT QUOTE
sent in from urgent care to rule out DVT. c/o right lower extremity swelling and pain for the last 2 days. diagnosed with a upper respiratory  infection last week. denies sob

## 2025-01-28 NOTE — ED PROVIDER NOTE - OBJECTIVE STATEMENT
28-year-old female history of Meniere's disease, anxiety, depression, bipolar disorder presenting with RLE swelling. Pt reports she had a viral illness for the last week and has been bed bound for the most part aside from intermittently using the restroom. Pt reports her R calf started hurting yesterday and today she noticed it was swollen. Pt denies any trauma to area. Pt denies OCP use, recent travel. Pt was previously on Depo shot in September but no longer taking. Pt denies fevers, chills, n/v, abdominal pain, dysuria, hematuria. Endorses cough.

## 2025-01-31 DIAGNOSIS — F41.9 ANXIETY DISORDER, UNSPECIFIED: ICD-10-CM

## 2025-01-31 DIAGNOSIS — H81.09 MENIERE'S DISEASE, UNSPECIFIED EAR: ICD-10-CM

## 2025-01-31 DIAGNOSIS — Z86.19 PERSONAL HISTORY OF OTHER INFECTIOUS AND PARASITIC DISEASES: ICD-10-CM

## 2025-01-31 DIAGNOSIS — M79.89 OTHER SPECIFIED SOFT TISSUE DISORDERS: ICD-10-CM

## 2025-01-31 DIAGNOSIS — M79.661 PAIN IN RIGHT LOWER LEG: ICD-10-CM

## 2025-01-31 DIAGNOSIS — F79 UNSPECIFIED INTELLECTUAL DISABILITIES: ICD-10-CM

## 2025-02-04 ENCOUNTER — APPOINTMENT (OUTPATIENT)
Dept: FAMILY MEDICINE | Facility: CLINIC | Age: 30
End: 2025-02-04
Payer: MEDICAID

## 2025-02-04 VITALS
HEIGHT: 61 IN | SYSTOLIC BLOOD PRESSURE: 110 MMHG | DIASTOLIC BLOOD PRESSURE: 78 MMHG | OXYGEN SATURATION: 99 % | BODY MASS INDEX: 51.35 KG/M2 | TEMPERATURE: 97.3 F | HEART RATE: 121 BPM | WEIGHT: 272 LBS

## 2025-02-04 VITALS — RESPIRATION RATE: 16 BRPM | HEART RATE: 118 BPM

## 2025-02-04 DIAGNOSIS — J45.909 UNSPECIFIED ASTHMA, UNCOMPLICATED: ICD-10-CM

## 2025-02-04 DIAGNOSIS — F25.1 SCHIZOAFFECTIVE DISORDER, DEPRESSIVE TYPE: ICD-10-CM

## 2025-02-04 DIAGNOSIS — F84.0 AUTISTIC DISORDER: ICD-10-CM

## 2025-02-04 DIAGNOSIS — Z01.818 ENCOUNTER FOR OTHER PREPROCEDURAL EXAMINATION: ICD-10-CM

## 2025-02-04 DIAGNOSIS — R00.0 TACHYCARDIA, UNSPECIFIED: ICD-10-CM

## 2025-02-04 DIAGNOSIS — F31.9 BIPOLAR DISORDER, UNSPECIFIED: ICD-10-CM

## 2025-02-04 DIAGNOSIS — H65.199 OTHER ACUTE NONSUPPURATIVE OTITIS MEDIA, UNSPECIFIED EAR: ICD-10-CM

## 2025-02-04 DIAGNOSIS — N92.6 IRREGULAR MENSTRUATION, UNSPECIFIED: ICD-10-CM

## 2025-02-04 PROCEDURE — 99213 OFFICE O/P EST LOW 20 MIN: CPT

## 2025-02-04 RX ORDER — VENLAFAXINE HYDROCHLORIDE 37.5 MG/1
37.5 CAPSULE, EXTENDED RELEASE ORAL
Qty: 90 | Refills: 1 | Status: ACTIVE | COMMUNITY
Start: 2025-02-04

## 2025-02-04 RX ORDER — METOPROLOL SUCCINATE 25 MG/1
25 TABLET, EXTENDED RELEASE ORAL
Qty: 90 | Refills: 0 | Status: ACTIVE | COMMUNITY
Start: 2025-02-04

## 2025-02-04 RX ORDER — ARIPIPRAZOLE 2 MG/1
2 TABLET ORAL
Refills: 0 | Status: ACTIVE | COMMUNITY
Start: 2025-02-04

## 2025-02-28 ENCOUNTER — APPOINTMENT (OUTPATIENT)
Dept: FAMILY MEDICINE | Facility: CLINIC | Age: 30
End: 2025-02-28
Payer: MEDICAID

## 2025-02-28 VITALS
TEMPERATURE: 98.2 F | OXYGEN SATURATION: 98 % | SYSTOLIC BLOOD PRESSURE: 110 MMHG | BODY MASS INDEX: 52.87 KG/M2 | HEIGHT: 61 IN | HEART RATE: 108 BPM | DIASTOLIC BLOOD PRESSURE: 62 MMHG | WEIGHT: 280.06 LBS

## 2025-02-28 DIAGNOSIS — H61.23 IMPACTED CERUMEN, BILATERAL: ICD-10-CM

## 2025-02-28 DIAGNOSIS — E66.01 MORBID (SEVERE) OBESITY DUE TO EXCESS CALORIES: ICD-10-CM

## 2025-02-28 DIAGNOSIS — G47.33 OBSTRUCTIVE SLEEP APNEA (ADULT) (PEDIATRIC): ICD-10-CM

## 2025-02-28 PROCEDURE — 99214 OFFICE O/P EST MOD 30 MIN: CPT

## 2025-02-28 PROCEDURE — G2211 COMPLEX E/M VISIT ADD ON: CPT | Mod: NC

## 2025-03-04 RX ORDER — TIRZEPATIDE 2.5 MG/.5ML
2.5 INJECTION, SOLUTION SUBCUTANEOUS
Qty: 1 | Refills: 0 | Status: ACTIVE | COMMUNITY
Start: 2025-02-28

## 2025-03-11 ENCOUNTER — APPOINTMENT (OUTPATIENT)
Dept: PULMONOLOGY | Facility: CLINIC | Age: 30
End: 2025-03-11
Payer: MEDICAID

## 2025-03-11 DIAGNOSIS — G47.33 OBSTRUCTIVE SLEEP APNEA (ADULT) (PEDIATRIC): ICD-10-CM

## 2025-03-11 DIAGNOSIS — F84.0 AUTISTIC DISORDER: ICD-10-CM

## 2025-03-11 DIAGNOSIS — J45.909 UNSPECIFIED ASTHMA, UNCOMPLICATED: ICD-10-CM

## 2025-03-11 DIAGNOSIS — F25.1 SCHIZOAFFECTIVE DISORDER, DEPRESSIVE TYPE: ICD-10-CM

## 2025-03-11 PROCEDURE — 99214 OFFICE O/P EST MOD 30 MIN: CPT | Mod: 95

## 2025-03-11 PROCEDURE — G2211 COMPLEX E/M VISIT ADD ON: CPT | Mod: NC,95

## 2025-03-20 ENCOUNTER — TRANSCRIPTION ENCOUNTER (OUTPATIENT)
Age: 30
End: 2025-03-20

## 2025-03-20 RX ORDER — SEMAGLUTIDE 0.25 MG/.5ML
0.25 INJECTION, SOLUTION SUBCUTANEOUS
Qty: 1 | Refills: 0 | Status: ACTIVE | COMMUNITY
Start: 2025-03-18 | End: 1900-01-01

## 2025-04-11 ENCOUNTER — APPOINTMENT (OUTPATIENT)
Dept: FAMILY MEDICINE | Facility: CLINIC | Age: 30
End: 2025-04-11

## 2025-04-11 VITALS
OXYGEN SATURATION: 98 % | BODY MASS INDEX: 53.43 KG/M2 | SYSTOLIC BLOOD PRESSURE: 122 MMHG | HEIGHT: 61 IN | DIASTOLIC BLOOD PRESSURE: 74 MMHG | TEMPERATURE: 98.1 F | HEART RATE: 101 BPM | WEIGHT: 283 LBS

## 2025-04-11 DIAGNOSIS — R73.03 PREDIABETES.: ICD-10-CM

## 2025-04-11 DIAGNOSIS — E66.01 MORBID (SEVERE) OBESITY DUE TO EXCESS CALORIES: ICD-10-CM

## 2025-04-11 PROCEDURE — G2211 COMPLEX E/M VISIT ADD ON: CPT | Mod: NC

## 2025-04-11 PROCEDURE — 99213 OFFICE O/P EST LOW 20 MIN: CPT

## 2025-04-14 LAB
ALBUMIN SERPL ELPH-MCNC: 4.2 G/DL
ALP BLD-CCNC: 102 U/L
ALT SERPL-CCNC: 12 U/L
ANION GAP SERPL CALC-SCNC: 13 MMOL/L
AST SERPL-CCNC: 12 U/L
BASOPHILS # BLD AUTO: 0.04 K/UL
BASOPHILS NFR BLD AUTO: 0.4 %
BILIRUB SERPL-MCNC: <0.2 MG/DL
BUN SERPL-MCNC: 9 MG/DL
CALCIUM SERPL-MCNC: 9.3 MG/DL
CHLORIDE SERPL-SCNC: 103 MMOL/L
CO2 SERPL-SCNC: 22 MMOL/L
CREAT SERPL-MCNC: 0.75 MG/DL
EGFRCR SERPLBLD CKD-EPI 2021: 112 ML/MIN/1.73M2
EOSINOPHIL # BLD AUTO: 0.12 K/UL
EOSINOPHIL NFR BLD AUTO: 1.1 %
ESTIMATED AVERAGE GLUCOSE: 117 MG/DL
GLUCOSE SERPL-MCNC: 96 MG/DL
HBA1C MFR BLD HPLC: 5.7 %
HCT VFR BLD CALC: 40.1 %
HGB BLD-MCNC: 12.9 G/DL
IMM GRANULOCYTES NFR BLD AUTO: 0.4 %
LYMPHOCYTES # BLD AUTO: 2.61 K/UL
LYMPHOCYTES NFR BLD AUTO: 24.6 %
MAN DIFF?: NORMAL
MCHC RBC-ENTMCNC: 26.3 PG
MCHC RBC-ENTMCNC: 32.2 G/DL
MCV RBC AUTO: 81.8 FL
MONOCYTES # BLD AUTO: 0.54 K/UL
MONOCYTES NFR BLD AUTO: 5.1 %
NEUTROPHILS # BLD AUTO: 7.28 K/UL
NEUTROPHILS NFR BLD AUTO: 68.4 %
PLATELET # BLD AUTO: 397 K/UL
POTASSIUM SERPL-SCNC: 4.5 MMOL/L
PROT SERPL-MCNC: 7.1 G/DL
RBC # BLD: 4.9 M/UL
RBC # FLD: 16.3 %
SODIUM SERPL-SCNC: 138 MMOL/L
WBC # FLD AUTO: 10.63 K/UL

## 2025-05-01 ENCOUNTER — APPOINTMENT (OUTPATIENT)
Dept: NEUROSURGERY | Facility: CLINIC | Age: 30
End: 2025-05-01
Payer: MEDICAID

## 2025-05-01 VITALS
OXYGEN SATURATION: 100 % | BODY MASS INDEX: 53.43 KG/M2 | HEART RATE: 103 BPM | WEIGHT: 283 LBS | SYSTOLIC BLOOD PRESSURE: 118 MMHG | HEIGHT: 61 IN | DIASTOLIC BLOOD PRESSURE: 78 MMHG | TEMPERATURE: 98.6 F

## 2025-05-01 DIAGNOSIS — I87.1 COMPRESSION OF VEIN: ICD-10-CM

## 2025-05-01 DIAGNOSIS — I67.1 CEREBRAL ANEURYSM, NONRUPTURED: ICD-10-CM

## 2025-05-01 DIAGNOSIS — G93.2 BENIGN INTRACRANIAL HYPERTENSION: ICD-10-CM

## 2025-05-01 PROCEDURE — 99214 OFFICE O/P EST MOD 30 MIN: CPT

## 2025-06-02 ENCOUNTER — APPOINTMENT (OUTPATIENT)
Dept: SURGERY | Facility: HOSPITAL | Age: 30
End: 2025-06-02

## 2025-06-13 ENCOUNTER — NON-APPOINTMENT (OUTPATIENT)
Age: 30
End: 2025-06-13

## 2025-06-20 ENCOUNTER — NON-APPOINTMENT (OUTPATIENT)
Age: 30
End: 2025-06-20

## 2025-06-23 ENCOUNTER — APPOINTMENT (OUTPATIENT)
Dept: FAMILY MEDICINE | Facility: CLINIC | Age: 30
End: 2025-06-23
Payer: MEDICAID

## 2025-06-23 VITALS
BODY MASS INDEX: 52.87 KG/M2 | HEIGHT: 61 IN | SYSTOLIC BLOOD PRESSURE: 126 MMHG | OXYGEN SATURATION: 98 % | TEMPERATURE: 97 F | DIASTOLIC BLOOD PRESSURE: 76 MMHG | HEART RATE: 86 BPM | WEIGHT: 280 LBS

## 2025-06-23 PROCEDURE — 99213 OFFICE O/P EST LOW 20 MIN: CPT

## 2025-06-23 PROCEDURE — G2211 COMPLEX E/M VISIT ADD ON: CPT | Mod: NC

## 2025-07-03 ENCOUNTER — APPOINTMENT (OUTPATIENT)
Dept: NEUROSURGERY | Facility: CLINIC | Age: 30
End: 2025-07-03

## 2025-08-08 ENCOUNTER — NON-APPOINTMENT (OUTPATIENT)
Age: 30
End: 2025-08-08

## 2025-08-08 ENCOUNTER — APPOINTMENT (OUTPATIENT)
Dept: ORTHOPEDIC SURGERY | Facility: CLINIC | Age: 30
End: 2025-08-08
Payer: MEDICAID

## 2025-08-08 VITALS — BODY MASS INDEX: 52.87 KG/M2 | WEIGHT: 280 LBS | HEIGHT: 61 IN

## 2025-08-08 DIAGNOSIS — M92.61 JUVENILE OSTEOCHONDROSIS OF TARSUS, RIGHT ANKLE: ICD-10-CM

## 2025-08-08 DIAGNOSIS — M77.30 CALCANEAL SPUR, UNSPECIFIED FOOT: ICD-10-CM

## 2025-08-08 DIAGNOSIS — M67.88 OTHER SPECIFIED DISORDERS OF SYNOVIUM AND TENDON, OTHER SITE: ICD-10-CM

## 2025-08-08 DIAGNOSIS — R29.898 OTHER SYMPTOMS AND SIGNS INVOLVING THE MUSCULOSKELETAL SYSTEM: ICD-10-CM

## 2025-08-08 DIAGNOSIS — M92.62 JUVENILE OSTEOCHONDROSIS OF TARSUS, RIGHT ANKLE: ICD-10-CM

## 2025-08-08 PROCEDURE — 73610 X-RAY EXAM OF ANKLE: CPT | Mod: LT

## 2025-08-08 PROCEDURE — 99214 OFFICE O/P EST MOD 30 MIN: CPT | Mod: 25

## 2025-08-08 PROCEDURE — 73630 X-RAY EXAM OF FOOT: CPT | Mod: LT

## 2025-09-21 ENCOUNTER — NON-APPOINTMENT (OUTPATIENT)
Age: 30
End: 2025-09-21